# Patient Record
Sex: MALE | Race: WHITE | Employment: FULL TIME | URBAN - METROPOLITAN AREA
[De-identification: names, ages, dates, MRNs, and addresses within clinical notes are randomized per-mention and may not be internally consistent; named-entity substitution may affect disease eponyms.]

---

## 2017-01-10 LAB
ALBUMIN SERPL-MCNC: 4.2 G/DL
ALP BLD-CCNC: 61 U/L
ALT SERPL-CCNC: 21 U/L
AST SERPL-CCNC: 17 U/L
BASOPHILS ABSOLUTE: NORMAL /ΜL
BASOPHILS RELATIVE PERCENT: NORMAL %
BILIRUB SERPL-MCNC: 0.4 MG/DL (ref 0.1–1.4)
BUN BLDV-MCNC: 18 MG/DL
CALCIUM SERPL-MCNC: 9.4 MG/DL
CHLORIDE BLD-SCNC: NORMAL MMOL/L
CHOLESTEROL, TOTAL: 201 MG/DL
CHOLESTEROL/HDL RATIO: 4.7
CO2: NORMAL MMOL/L
CREAT SERPL-MCNC: 1.11 MG/DL
EOSINOPHILS ABSOLUTE: NORMAL /ΜL
EOSINOPHILS RELATIVE PERCENT: NORMAL %
GFR CALCULATED: 73
GLUCOSE BLD-MCNC: 95 MG/DL
HCT VFR BLD CALC: 41.2 % (ref 41–53)
HDLC SERPL-MCNC: 43 MG/DL (ref 35–70)
HEMOGLOBIN: 13.9 G/DL (ref 13.5–17.5)
IRON: 84
LDL CHOLESTEROL CALCULATED: 134 MG/DL (ref 0–160)
LYMPHOCYTES ABSOLUTE: NORMAL /ΜL
LYMPHOCYTES RELATIVE PERCENT: NORMAL %
MCH RBC QN AUTO: 28.2 PG
MCHC RBC AUTO-ENTMCNC: 33.7 G/DL
MCV RBC AUTO: 84 FL
MONOCYTES ABSOLUTE: NORMAL /ΜL
MONOCYTES RELATIVE PERCENT: NORMAL %
NEUTROPHILS ABSOLUTE: NORMAL /ΜL
NEUTROPHILS RELATIVE PERCENT: NORMAL %
PDW BLD-RTO: 13.9 %
PHOSPHORUS: 4.2 MG/DL
PLATELET # BLD: 263 K/ΜL
PMV BLD AUTO: NORMAL FL
POTASSIUM SERPL-SCNC: 5.5 MMOL/L
PROSTATE SPECIFIC ANTIGEN: 0.3 NG/ML
RBC # BLD: 4.93 10^6/ΜL
SODIUM BLD-SCNC: NORMAL MMOL/L
TOTAL PROTEIN: 6.3
TRIGL SERPL-MCNC: 118 MG/DL
TSH SERPL DL<=0.05 MIU/L-ACNC: 2.47 UIU/ML
URIC ACID: 4.6
VLDLC SERPL CALC-MCNC: 24 MG/DL
WBC # BLD: 5.9 10^3/ML

## 2017-01-17 ENCOUNTER — TELEPHONE (OUTPATIENT)
Dept: PULMONOLOGY | Age: 57
End: 2017-01-17

## 2017-01-17 DIAGNOSIS — R59.0 MEDIASTINAL ADENOPATHY: Primary | ICD-10-CM

## 2017-03-20 ENCOUNTER — HOSPITAL ENCOUNTER (OUTPATIENT)
Dept: CT IMAGING | Age: 57
Discharge: HOME | End: 2017-03-20
Attending: NURSE PRACTITIONER | Admitting: NURSE PRACTITIONER

## 2017-03-20 DIAGNOSIS — R59.0 MEDIASTINAL ADENOPATHY: ICD-10-CM

## 2017-03-20 DIAGNOSIS — R59.0 LOCALIZED ENLARGED LYMPH NODES: ICD-10-CM

## 2017-03-22 ENCOUNTER — OFFICE VISIT (OUTPATIENT)
Dept: PULMONOLOGY | Age: 57
End: 2017-03-22

## 2017-03-22 VITALS
DIASTOLIC BLOOD PRESSURE: 70 MMHG | BODY MASS INDEX: 29.13 KG/M2 | HEART RATE: 86 BPM | HEIGHT: 74 IN | RESPIRATION RATE: 16 BRPM | OXYGEN SATURATION: 96 % | SYSTOLIC BLOOD PRESSURE: 110 MMHG | WEIGHT: 227 LBS | TEMPERATURE: 98.2 F

## 2017-03-22 DIAGNOSIS — D86.9 SARCOID: ICD-10-CM

## 2017-03-22 DIAGNOSIS — R91.8 PULMONARY NODULES: ICD-10-CM

## 2017-03-22 DIAGNOSIS — R59.0 MEDIASTINAL ADENOPATHY: Primary | ICD-10-CM

## 2017-03-22 PROCEDURE — 99214 OFFICE O/P EST MOD 30 MIN: CPT | Performed by: INTERNAL MEDICINE

## 2017-09-29 ENCOUNTER — TELEPHONE (OUTPATIENT)
Dept: PULMONOLOGY | Age: 57
End: 2017-09-29

## 2017-10-02 ENCOUNTER — OFFICE VISIT (OUTPATIENT)
Dept: PULMONOLOGY | Age: 57
End: 2017-10-02

## 2017-10-02 VITALS
RESPIRATION RATE: 16 BRPM | HEIGHT: 74 IN | TEMPERATURE: 97.8 F | OXYGEN SATURATION: 98 % | DIASTOLIC BLOOD PRESSURE: 59 MMHG | BODY MASS INDEX: 29.52 KG/M2 | HEART RATE: 70 BPM | WEIGHT: 230 LBS | SYSTOLIC BLOOD PRESSURE: 101 MMHG

## 2017-10-02 DIAGNOSIS — D86.9 SARCOID: Primary | ICD-10-CM

## 2017-10-02 DIAGNOSIS — R91.8 PULMONARY NODULES: ICD-10-CM

## 2017-10-02 DIAGNOSIS — R59.0 MEDIASTINAL ADENOPATHY: ICD-10-CM

## 2017-10-02 PROCEDURE — 99214 OFFICE O/P EST MOD 30 MIN: CPT | Performed by: INTERNAL MEDICINE

## 2017-10-02 NOTE — PROGRESS NOTES
Chief complaint  This is a 61y.o. year old male  who comes to see me with a chief complaint of   Chief Complaint   Patient presents with    Follow-up     pt here to follow up on mediastinal adenopathy       HPI  Here with follow up on sarcoidosis    Doing well. No new issues. I had wanted him to get a CT in September to follow some new nodules found on the CT back in March. He said he was never told to get it. He has no complaints and is actually doing very good    Past Medical History:   Diagnosis Date    Psoriasis        Past Surgical History:   Procedure Laterality Date    ANTERIOR CRUCIATE LIGAMENT REPAIR Left 1990    APPENDECTOMY      BRONCHOSCOPY  12/12/2016    UPPER GASTROINTESTINAL ENDOSCOPY  12/29/2016    Dr Yuliana Bullock, dilation        No current outpatient prescriptions on file. No current outpatient prescriptions on file. No current facility-administered medications for this visit.         ROS:  GENERAL:  No fevers, chills, or night sweats  HEENT:  No double vision, blurry vision, or difficulty swallowing  HEART:  No chest pain, no palpitations  LUNGS:  No shortness of breath, no wheezing, no cough  ABDOMEN: No pain, no changes in bowels  GENITOURINARY:  No increased frequency, no blood in urine  EXTREMITIES:  No swelling, no lesions  NEURO:  No numbness or tingling, no seizures  SKIN:  No new rashes, no changes in hair or nails  LYMPH:  No masses, no swelling neck, armpits, or groin    PHYSICAL EXAM:  Vitals:    10/02/17 0859   BP: (!) 101/59   Pulse: 70   Resp: 16   Temp: 97.8 °F (36.6 °C)   SpO2: 98%       GENERAL:  Well nourished, alert, appears stated age, no distress  HEENT:  No scleral icterus, no conjunctival irritation  NECK:  No thyromegaly, no bruits  LYMPH:  No cervical or supraclavicular adenopathy  HEART:  Regular rate and rhythm, no murmurs  LUNGS:  Clear bilaterally  ABDOMEN:  No distention, no organomegaly  EXTREMITIES:  No edema, no digital clubbing  NEURO:  No localizing

## 2017-10-02 NOTE — MR AVS SNAPSHOT
your BMI by decreasing your calorie intake and becoming more physically active. Learn more at: enavu.co.uk          Instructions    CT Chest now    Follow up in 6 months            Medications and Orders      Allergies           No Known Allergies         Additional Information        Basic Information     Date Of Birth Sex Race Ethnicity Preferred Language    1960 Male White Non-/Non  English      Problem List as of 10/2/2017                 Abnormal CT scan, chest    Epigastric pain    Psoriasis      Immunizations as of 10/2/2017     Name Date    Hepatitis A 6/18/2002, 6/9/2000, 10/29/1999, 6/14/1996    Hepatitis B 6/18/2002, 6/9/2000, 10/29/1999    Influenza Vaccine, unspecified formulation 9/17/2016    Td 12/4/2013    Tetanus 6/18/2002      Preventive Care        Date Due    Hepatitis C screening is recommended for all adults regardless of risk factors born between [facility] at least once (lifetime) who have never been tested. 1960    HIV screening is recommended for all people regardless of risk factors  aged 15-65 years at least once (lifetime) who have never been HIV tested. 7/17/1974    Tetanus Combination Vaccine (1 - Tdap) 12/5/2013    Yearly Flu Vaccine (1) 9/1/2017    Colonoscopy 3/10/2019    Cholesterol Screening 1/10/2022            [employer] Signup           Our records indicate that you have an active [employer] account. You can view your After Visit Summary by going to [email or web address] and logging in with your [employer] username and password. If you don't have a [employer] username and password but a parent or guardian has access to your record, the parent or guardian should login with their own [employer] username and password and access your record to view the After Visit Summary.      Additional Information  If you have questions, please contact the physician practice where you receive care. Remember, [email or web address] is NOT to be used for urgent needs. For medical emergencies, dial 911. For questions regarding your [email or web address] account call [phone]. If you have a clinical question, please call your doctor's office.

## 2017-10-17 ENCOUNTER — HOSPITAL ENCOUNTER (OUTPATIENT)
Dept: CT IMAGING | Age: 57
Discharge: HOME | End: 2017-10-17
Attending: INTERNAL MEDICINE | Admitting: INTERNAL MEDICINE

## 2017-10-17 DIAGNOSIS — R91.8 PULMONARY NODULES: ICD-10-CM

## 2017-10-17 DIAGNOSIS — R91.8 OTHER NONSPECIFIC ABNORMAL FINDING OF LUNG FIELD (CODE): ICD-10-CM

## 2018-01-17 LAB
ALBUMIN SERPL-MCNC: 4.3 G/DL
ALP BLD-CCNC: 74 U/L
ALT SERPL-CCNC: 26 U/L
ANION GAP SERPL CALCULATED.3IONS-SCNC: NORMAL MMOL/L
AST SERPL-CCNC: 19 U/L
BASOPHILS ABSOLUTE: NORMAL /ΜL
BASOPHILS RELATIVE PERCENT: NORMAL %
BILIRUB SERPL-MCNC: 0.5 MG/DL (ref 0.1–1.4)
BUN BLDV-MCNC: 16 MG/DL
CALCIUM SERPL-MCNC: 9.7 MG/DL
CHLORIDE BLD-SCNC: 103 MMOL/L
CHOLESTEROL, TOTAL: 231 MG/DL
CHOLESTEROL/HDL RATIO: 4.4
CO2: NORMAL MMOL/L
CREAT SERPL-MCNC: 1.01 MG/DL
EOSINOPHILS ABSOLUTE: NORMAL /ΜL
EOSINOPHILS RELATIVE PERCENT: NORMAL %
GFR CALCULATED: 82
GLUCOSE BLD-MCNC: 92 MG/DL
GLUCOSE BLD-MCNC: 92 MG/DL
HCT VFR BLD CALC: 43.1 % (ref 41–53)
HDLC SERPL-MCNC: 52 MG/DL (ref 35–70)
HEMOGLOBIN: 14.9 G/DL (ref 13.5–17.5)
IRON: 124
LDL CHOLESTEROL CALCULATED: 161 MG/DL (ref 0–160)
LYMPHOCYTES ABSOLUTE: NORMAL /ΜL
LYMPHOCYTES RELATIVE PERCENT: NORMAL %
MCH RBC QN AUTO: 28.7 PG
MCHC RBC AUTO-ENTMCNC: 34.6 G/DL
MCV RBC AUTO: 83 FL
MONOCYTES ABSOLUTE: NORMAL /ΜL
MONOCYTES RELATIVE PERCENT: NORMAL %
NEUTROPHILS ABSOLUTE: NORMAL /ΜL
NEUTROPHILS RELATIVE PERCENT: NORMAL %
PLATELET # BLD: 223 K/ΜL
PMV BLD AUTO: NORMAL FL
POTASSIUM SERPL-SCNC: 5.1 MMOL/L
RBC # BLD: 5.19 10^6/ΜL
SODIUM BLD-SCNC: 142 MMOL/L
TOTAL PROTEIN: 6.6
TRIGL SERPL-MCNC: 88 MG/DL
URIC ACID: 5.3
VLDLC SERPL CALC-MCNC: 18 MG/DL
WBC # BLD: 5.1 10^3/ML

## 2018-01-30 ENCOUNTER — TELEPHONE (OUTPATIENT)
Dept: INTERNAL MEDICINE CLINIC | Age: 58
End: 2018-01-30

## 2018-06-26 ENCOUNTER — OFFICE VISIT (OUTPATIENT)
Dept: INTERNAL MEDICINE CLINIC | Age: 58
End: 2018-06-26

## 2018-06-26 VITALS
RESPIRATION RATE: 14 BRPM | BODY MASS INDEX: 30.67 KG/M2 | DIASTOLIC BLOOD PRESSURE: 73 MMHG | WEIGHT: 239 LBS | SYSTOLIC BLOOD PRESSURE: 129 MMHG | OXYGEN SATURATION: 97 % | HEIGHT: 74 IN | HEART RATE: 72 BPM

## 2018-06-26 DIAGNOSIS — M25.562 LEFT KNEE PAIN, UNSPECIFIED CHRONICITY: Primary | ICD-10-CM

## 2018-06-26 PROCEDURE — 99213 OFFICE O/P EST LOW 20 MIN: CPT | Performed by: INTERNAL MEDICINE

## 2018-06-26 RX ORDER — DICLOFENAC SODIUM 75 MG/1
75 TABLET, DELAYED RELEASE ORAL 2 TIMES DAILY
Qty: 60 TABLET | Refills: 1 | Status: SHIPPED | OUTPATIENT
Start: 2018-06-26 | End: 2019-10-18

## 2018-06-26 RX ORDER — KETOCONAZOLE 20 MG/G
CREAM TOPICAL
COMMUNITY
Start: 2018-06-22

## 2018-06-26 RX ORDER — CLOBETASOL PROPIONATE 0.05 MG/G
GEL TOPICAL
COMMUNITY
Start: 2018-06-22

## 2018-10-09 ENCOUNTER — OFFICE VISIT (OUTPATIENT)
Dept: INTERNAL MEDICINE CLINIC | Age: 58
End: 2018-10-09
Payer: COMMERCIAL

## 2018-10-09 VITALS
SYSTOLIC BLOOD PRESSURE: 118 MMHG | WEIGHT: 238 LBS | OXYGEN SATURATION: 98 % | TEMPERATURE: 98.6 F | BODY MASS INDEX: 30.54 KG/M2 | RESPIRATION RATE: 14 BRPM | HEART RATE: 78 BPM | DIASTOLIC BLOOD PRESSURE: 70 MMHG | HEIGHT: 74 IN

## 2018-10-09 DIAGNOSIS — Z23 NEED FOR PROPHYLACTIC VACCINATION AND INOCULATION AGAINST VARICELLA: ICD-10-CM

## 2018-10-09 DIAGNOSIS — Z23 NEED FOR SHINGLES VACCINE: ICD-10-CM

## 2018-10-09 DIAGNOSIS — N52.8 OTHER MALE ERECTILE DYSFUNCTION: ICD-10-CM

## 2018-10-09 DIAGNOSIS — Z00.00 ANNUAL PHYSICAL EXAM: Primary | ICD-10-CM

## 2018-10-09 PROCEDURE — 99214 OFFICE O/P EST MOD 30 MIN: CPT | Performed by: INTERNAL MEDICINE

## 2018-10-09 RX ORDER — SILDENAFIL CITRATE 20 MG/1
TABLET ORAL
Qty: 30 TABLET | Refills: 3 | Status: SHIPPED | OUTPATIENT
Start: 2018-10-09 | End: 2019-10-18

## 2018-10-09 ASSESSMENT — PATIENT HEALTH QUESTIONNAIRE - PHQ9
1. LITTLE INTEREST OR PLEASURE IN DOING THINGS: 0
2. FEELING DOWN, DEPRESSED OR HOPELESS: 0
SUM OF ALL RESPONSES TO PHQ QUESTIONS 1-9: 0
SUM OF ALL RESPONSES TO PHQ9 QUESTIONS 1 & 2: 0
SUM OF ALL RESPONSES TO PHQ QUESTIONS 1-9: 0

## 2018-10-09 ASSESSMENT — ENCOUNTER SYMPTOMS
RESPIRATORY NEGATIVE: 1
EYES NEGATIVE: 1
GASTROINTESTINAL NEGATIVE: 1

## 2018-10-09 NOTE — PROGRESS NOTES
regular rhythm and normal heart sounds. Pulmonary/Chest: Effort normal and breath sounds normal. No respiratory distress. He has no wheezes. Abdominal: Soft. Bowel sounds are normal. He exhibits no distension. Musculoskeletal: He exhibits no edema or tenderness. Neurological: He is alert and oriented to person, place, and time. Skin: Skin is warm and dry. Assessment:      1. Annual physical exam    2. Other male erectile dysfunction    3. Need for shingles vaccine            Plan:      Doris Rosas was seen today for annual exam and blood work. Diagnoses and all orders for this visit:    Annual physical exam  -     Glucose, Fasting; Future  -     Lipid Panel; Future    Other male erectile dysfunction  -     sildenafil (REVATIO) 20 MG tablet; Take 1-2 tablets one hour before sexual activity. Need for shingles vaccine  -     Zoster recombinant [facility])    Discussed need for colonoscopy next year. He states he feels well and does not want to return to Dr. Larry Burrell at this time.           Hawa Go MD

## 2018-10-10 ENCOUNTER — NURSE ONLY (OUTPATIENT)
Dept: INTERNAL MEDICINE CLINIC | Age: 58
End: 2018-10-10
Payer: COMMERCIAL

## 2018-10-10 DIAGNOSIS — Z00.00 ANNUAL PHYSICAL EXAM: ICD-10-CM

## 2018-10-10 LAB
CHOLESTEROL, TOTAL: 215 MG/DL (ref 0–199)
GLUCOSE FASTING: 96 MG/DL (ref 70–99)
HDLC SERPL-MCNC: 50 MG/DL (ref 40–60)
LDL CHOLESTEROL CALCULATED: 145 MG/DL
TRIGL SERPL-MCNC: 101 MG/DL (ref 0–150)
VLDLC SERPL CALC-MCNC: 20 MG/DL

## 2018-10-10 PROCEDURE — 36415 COLL VENOUS BLD VENIPUNCTURE: CPT | Performed by: INTERNAL MEDICINE

## 2018-10-29 ENCOUNTER — TELEPHONE (OUTPATIENT)
Dept: INTERNAL MEDICINE CLINIC | Age: 58
End: 2018-10-29

## 2019-10-18 ENCOUNTER — OFFICE VISIT (OUTPATIENT)
Dept: INTERNAL MEDICINE CLINIC | Age: 59
End: 2019-10-18
Payer: COMMERCIAL

## 2019-10-18 VITALS
DIASTOLIC BLOOD PRESSURE: 78 MMHG | WEIGHT: 241 LBS | SYSTOLIC BLOOD PRESSURE: 110 MMHG | RESPIRATION RATE: 16 BRPM | BODY MASS INDEX: 30.93 KG/M2 | OXYGEN SATURATION: 98 % | HEART RATE: 65 BPM | HEIGHT: 74 IN

## 2019-10-18 DIAGNOSIS — Z00.00 ENCOUNTER FOR PREVENTIVE CARE: ICD-10-CM

## 2019-10-18 LAB
A/G RATIO: 2.2 (ref 1.1–2.2)
ALBUMIN SERPL-MCNC: 4.6 G/DL (ref 3.4–5)
ALP BLD-CCNC: 62 U/L (ref 40–129)
ALT SERPL-CCNC: 18 U/L (ref 10–40)
ANION GAP SERPL CALCULATED.3IONS-SCNC: 12 MMOL/L (ref 3–16)
AST SERPL-CCNC: 16 U/L (ref 15–37)
BASOPHILS ABSOLUTE: 0 K/UL (ref 0–0.2)
BASOPHILS RELATIVE PERCENT: 1.1 %
BILIRUB SERPL-MCNC: 0.4 MG/DL (ref 0–1)
BUN BLDV-MCNC: 19 MG/DL (ref 7–20)
CALCIUM SERPL-MCNC: 9.5 MG/DL (ref 8.3–10.6)
CHLORIDE BLD-SCNC: 104 MMOL/L (ref 99–110)
CHOLESTEROL, TOTAL: 206 MG/DL (ref 0–199)
CO2: 25 MMOL/L (ref 21–32)
CREAT SERPL-MCNC: 0.9 MG/DL (ref 0.8–1.3)
EOSINOPHILS ABSOLUTE: 0.2 K/UL (ref 0–0.6)
EOSINOPHILS RELATIVE PERCENT: 5.1 %
GFR AFRICAN AMERICAN: >60
GFR NON-AFRICAN AMERICAN: >60
GLOBULIN: 2.1 G/DL
GLUCOSE BLD-MCNC: 95 MG/DL (ref 70–99)
HCT VFR BLD CALC: 44.9 % (ref 40.5–52.5)
HDLC SERPL-MCNC: 59 MG/DL (ref 40–60)
HEMOGLOBIN: 14.7 G/DL (ref 13.5–17.5)
LDL CHOLESTEROL CALCULATED: 132 MG/DL
LYMPHOCYTES ABSOLUTE: 1 K/UL (ref 1–5.1)
LYMPHOCYTES RELATIVE PERCENT: 24.3 %
MCH RBC QN AUTO: 28.7 PG (ref 26–34)
MCHC RBC AUTO-ENTMCNC: 32.8 G/DL (ref 31–36)
MCV RBC AUTO: 87.6 FL (ref 80–100)
MONOCYTES ABSOLUTE: 0.5 K/UL (ref 0–1.3)
MONOCYTES RELATIVE PERCENT: 12.5 %
NEUTROPHILS ABSOLUTE: 2.4 K/UL (ref 1.7–7.7)
NEUTROPHILS RELATIVE PERCENT: 57 %
PDW BLD-RTO: 14.4 % (ref 12.4–15.4)
PLATELET # BLD: 210 K/UL (ref 135–450)
PMV BLD AUTO: 9.4 FL (ref 5–10.5)
POTASSIUM SERPL-SCNC: 4.5 MMOL/L (ref 3.5–5.1)
PROSTATE SPECIFIC ANTIGEN: 0.58 NG/ML (ref 0–4)
RBC # BLD: 5.13 M/UL (ref 4.2–5.9)
SODIUM BLD-SCNC: 141 MMOL/L (ref 136–145)
TOTAL PROTEIN: 6.7 G/DL (ref 6.4–8.2)
TRIGL SERPL-MCNC: 74 MG/DL (ref 0–150)
TSH REFLEX: 2.93 UIU/ML (ref 0.27–4.2)
VLDLC SERPL CALC-MCNC: 15 MG/DL
WBC # BLD: 4.2 K/UL (ref 4–11)

## 2019-10-18 PROCEDURE — 99396 PREV VISIT EST AGE 40-64: CPT | Performed by: NURSE PRACTITIONER

## 2019-10-18 PROCEDURE — 36415 COLL VENOUS BLD VENIPUNCTURE: CPT | Performed by: NURSE PRACTITIONER

## 2019-10-18 ASSESSMENT — PATIENT HEALTH QUESTIONNAIRE - PHQ9
SUM OF ALL RESPONSES TO PHQ QUESTIONS 1-9: 0
1. LITTLE INTEREST OR PLEASURE IN DOING THINGS: 0
SUM OF ALL RESPONSES TO PHQ QUESTIONS 1-9: 0
SUM OF ALL RESPONSES TO PHQ9 QUESTIONS 1 & 2: 0
2. FEELING DOWN, DEPRESSED OR HOPELESS: 0

## 2019-10-22 ENCOUNTER — TELEPHONE (OUTPATIENT)
Dept: INTERNAL MEDICINE CLINIC | Age: 59
End: 2019-10-22

## 2020-01-18 ENCOUNTER — HOSPITAL ENCOUNTER (EMERGENCY)
Age: 60
Discharge: LEFT BEFORE DISCHARGE | End: 2020-01-18
Payer: COMMERCIAL

## 2020-06-29 ENCOUNTER — OFFICE VISIT (OUTPATIENT)
Dept: FAMILY MEDICINE CLINIC | Age: 60
End: 2020-06-29
Payer: COMMERCIAL

## 2020-06-29 VITALS
BODY MASS INDEX: 31.11 KG/M2 | SYSTOLIC BLOOD PRESSURE: 112 MMHG | HEIGHT: 74 IN | TEMPERATURE: 97.1 F | DIASTOLIC BLOOD PRESSURE: 78 MMHG | WEIGHT: 242.4 LBS

## 2020-06-29 LAB
A/G RATIO: 2.2 (ref 1.1–2.2)
ALBUMIN SERPL-MCNC: 4.4 G/DL (ref 3.4–5)
ALP BLD-CCNC: 64 U/L (ref 40–129)
ALT SERPL-CCNC: 20 U/L (ref 10–40)
ANION GAP SERPL CALCULATED.3IONS-SCNC: 14 MMOL/L (ref 3–16)
AST SERPL-CCNC: 17 U/L (ref 15–37)
BILIRUB SERPL-MCNC: 0.3 MG/DL (ref 0–1)
BUN BLDV-MCNC: 24 MG/DL (ref 7–20)
CALCIUM SERPL-MCNC: 9.7 MG/DL (ref 8.3–10.6)
CHLORIDE BLD-SCNC: 105 MMOL/L (ref 99–110)
CO2: 24 MMOL/L (ref 21–32)
CREAT SERPL-MCNC: 0.9 MG/DL (ref 0.8–1.3)
GFR AFRICAN AMERICAN: >60
GFR NON-AFRICAN AMERICAN: >60
GLOBULIN: 2 G/DL
GLUCOSE BLD-MCNC: 92 MG/DL (ref 70–99)
HCT VFR BLD CALC: 42.9 % (ref 40.5–52.5)
HEMOGLOBIN: 14.3 G/DL (ref 13.5–17.5)
MCH RBC QN AUTO: 29.1 PG (ref 26–34)
MCHC RBC AUTO-ENTMCNC: 33.3 G/DL (ref 31–36)
MCV RBC AUTO: 87.5 FL (ref 80–100)
PDW BLD-RTO: 14 % (ref 12.4–15.4)
PLATELET # BLD: 203 K/UL (ref 135–450)
PMV BLD AUTO: 9.5 FL (ref 5–10.5)
POTASSIUM SERPL-SCNC: 4.4 MMOL/L (ref 3.5–5.1)
RBC # BLD: 4.9 M/UL (ref 4.2–5.9)
SODIUM BLD-SCNC: 143 MMOL/L (ref 136–145)
TOTAL PROTEIN: 6.4 G/DL (ref 6.4–8.2)
WBC # BLD: 4.8 K/UL (ref 4–11)

## 2020-06-29 PROCEDURE — 93000 ELECTROCARDIOGRAM COMPLETE: CPT | Performed by: FAMILY MEDICINE

## 2020-06-29 PROCEDURE — 99214 OFFICE O/P EST MOD 30 MIN: CPT | Performed by: FAMILY MEDICINE

## 2020-06-29 ASSESSMENT — ENCOUNTER SYMPTOMS
VOICE CHANGE: 0
RHINORRHEA: 1
SINUS PRESSURE: 1
SORE THROAT: 0
TROUBLE SWALLOWING: 0

## 2020-06-29 NOTE — PROGRESS NOTES
2020    Ashish Burleson (:  1960) is a 60 y.o. male, here for evaluation of the following medical concerns:    HPI    1. Well adult visit  discussed vaccinations and he declined  -discussed HIV testing and basic labs he declined  -discuseds healthy eating habits and exercise and answered questions  -discussed age appropriate screening recommendations    2. Chest pain- has had it for 6 weeks, unsure what brings it on, no SOB, rest does improve symptoms, otc medication hasn't used, no history of htn, hyperlipidemia, , HDL 61, father passed away of a MI, at age [de-identified]. Believes its stress induced, possible two episodes, has one episode shalom. Years ago went to the ER and was found to be wnl,     3. Colon cancer screen- patient will be referred to Dr. Ajay Talbert for shalom. Patient needs colonoscopy q 5 years. 4.   Allergic rhinitis- patient has had rhinorrhea, congestion for the past several months. Hasn't used OTC medication on a consecutive basis. Patient denied fever or chills at this time. Today, he denied chest pain, sob, n, v, or diarrhea. Review of Systems   Constitutional: Negative for activity change, fatigue and unexpected weight change. HENT: Positive for congestion, rhinorrhea and sinus pressure. Negative for ear pain, sneezing, sore throat, trouble swallowing and voice change. Eyes: Negative for visual disturbance. Respiratory: Negative for cough and shortness of breath. Cardiovascular: Positive for chest pain. Negative for palpitations and leg swelling. Gastrointestinal: Negative for abdominal pain, diarrhea, nausea and vomiting. Skin: Negative for color change. Neurological: Negative for light-headedness and headaches. Psychiatric/Behavioral: Negative for dysphoric mood. The patient is not nervous/anxious. Prior to Visit Medications    Medication Sig Taking?  Authorizing Provider   clobetasol propionate 0.05 % GEL gel  Yes Historical Provider, MD

## 2020-06-30 ASSESSMENT — ENCOUNTER SYMPTOMS
VOMITING: 0
ABDOMINAL PAIN: 0
COLOR CHANGE: 0
DIARRHEA: 0
COUGH: 0
NAUSEA: 0
SHORTNESS OF BREATH: 0

## 2020-09-02 ENCOUNTER — TELEPHONE (OUTPATIENT)
Dept: FAMILY MEDICINE CLINIC | Age: 60
End: 2020-09-02

## 2020-09-02 NOTE — TELEPHONE ENCOUNTER
----- Message from [facility] sent at 9/2/2020  2:30 PM EDT -----  Subject: Message to Provider    QUESTIONS  Information for Provider? Patient states Dr. Fred Henao was supposed to have   scheduled a stress test for him    they spoke about this at the appointment on 06/29. Please call patient or   respond in My Chart to discuss. ---------------------------------------------------------------------------  --------------  Fabian MOORE  What is the best way for the office to contact you? Do not leave any   message   patient will call back for answer  Preferred Call Back Phone Number? [phone]  ---------------------------------------------------------------------------  --------------  SCRIPT ANSWERS  Relationship to Patient?  Self

## 2020-09-02 NOTE — TELEPHONE ENCOUNTER
Please contact the patient and let him know that the order is in? I'm not sure why he hasn't heard back? Let him know who to schedule.   If they need a new order let me know Dr. Rhett Dodd

## 2020-09-03 NOTE — TELEPHONE ENCOUNTER
Pt advised through [employer] message that the order was placed and given number to central scheduling to call and schedule

## 2020-09-08 ENCOUNTER — OFFICE VISIT (OUTPATIENT)
Dept: PRIMARY CARE CLINIC | Age: 60
End: 2020-09-08
Payer: COMMERCIAL

## 2020-09-08 PROCEDURE — 99211 OFF/OP EST MAY X REQ PHY/QHP: CPT | Performed by: NURSE PRACTITIONER

## 2020-09-10 LAB — SARS-COV-2, NAA: NOT DETECTED

## 2020-09-21 ENCOUNTER — OFFICE VISIT (OUTPATIENT)
Dept: PRIMARY CARE CLINIC | Age: 60
End: 2020-09-21
Payer: COMMERCIAL

## 2020-09-21 PROCEDURE — 99211 OFF/OP EST MAY X REQ PHY/QHP: CPT | Performed by: NURSE PRACTITIONER

## 2020-09-21 NOTE — PROGRESS NOTES
Michele Burleson received a viral test for COVID-19. They were educated on isolation and quarantine as appropriate. For any symptoms, they were directed to seek care from their PCP, given contact information to establish with a doctor, directed to an urgent care or the emergency room.

## 2020-09-22 LAB — SARS-COV-2, NAA: NOT DETECTED

## 2020-09-23 NOTE — FLOWSHEET NOTE
Preoperative Screening for Elective Surgery/Invasive Procedures While COVID-19 present in the community     Have you tested positive or have been told to self-isolate for COVID-19 like symptoms within the past 28 days?  Do you currently have any of the following symptoms? o Fever >100.0 F or 99.9 F in immunocompromised patients? o New onset cough, shortness of breath or difficulty breathing?  o New onset sore throat, myalgia (muscle aches and pains), headache, loss of taste/smell or diarrhea?  Have you had a potential exposure to COVID-19 within the past 14 days by:  o Close contact with a confirmed case? o Close contact with a healthcare worker,  or essential infrastructure worker (grocery store, [employer], gas station, public utilities or transportation)? o Do you reside in a congregate setting such as; skilled nursing facility, adult home, correctional facility, homeless shelter or other institutional setting?  o Have you had recent travel to a known COVID-19 hotspot? Indicate if the patient has a positive screen by answering yes to one or more of the above questions. Patients who test positive or screen positive prior to surgery or on the day of surgery should be evaluated in conjunction with the surgeon/proceduralist/anesthesiologist to determine the urgency of the procedure.     No to all above

## 2020-09-23 NOTE — PROGRESS NOTES
[facility] [facility] time_____9/29/20 1300_______        Surgery time____1400________        Do not eat or drink anything after 12:00 midnight prior to your surgery. This includes water chewing gum, mints and ice chips. You may brush your teeth and gargle the morning of your surgery, but do not swallow the water     Please see your family doctor/pediatrician for a history and physical and/or concerning medications. Bring any test results/reports from your physicians office. If you are under the care of a heart doctor or specialist doctor, please be aware that you may be asked to them for clearance    You may be asked to stop blood thinners such as Coumadin, Plavix, Fragmin, Lovenox, etc., or any anti-inflammatories such as:  Aspirin, Ibuprofen, Advil, Naproxen prior to your surgery. We also ask that you stop any OTC medications such as fish oil, vitamin E, glucosamine, garlic, Multivitamins, COQ 10, etc.    We ask that you do not smoke 24 hours prior to surgery  We ask that you do not  drink any alcoholic beverages 24 hours prior to surgery     You must make arrangements for a responsible adult to take you home after your surgery. For your safety you will not be allowed to leave alone or drive yourself home. Your surgery will be cancelled if you do not have a ride home. Also for your safety, it is strongly suggested that someone stay with you the first 24 hours after your surgery. A parent or legal guardian must accompany a child scheduled for surgery and plan to stay at the hospital until the child is discharged. Please do not bring other children with you. For your comfort, please wear simple loose fitting clothing to the hospital.  Please do not bring valuables. Do not wear any make-up or nail polish on your fingers or toes      For your safety, please do not wear any jewelry or body piercing's on the day of surgery.    All jewelry must be removed. If you have dentures, they will be removed before going to operating room. For your convenience, we will provide you with a container. If you wear contact lenses or glasses, they will be removed, please bring a case for them. If you have a living will and a durable power of  for healthcare, please bring in a copy. As part of our patient safety program to minimize surgical site infections, we ask you to do the following:    · Please notify your surgeon if you develop any illness between         now and the  day of your surgery. · This includes a cough, cold, fever, sore throat, nausea,         or vomiting, and diarrhea, etc.  ·  Please notify your surgeon if you experience dizziness, shortness         of breath or blurred vision between now and the time of your surgery. Do not shave your operative site 96 hours prior to surgery. For face and neck surgery, men may use an electric razor 48 hours   prior to surgery. You may shower the night before surgery or the morning of   your surgery with an antibacterial soap. You will need to bring a photo ID and insurance card    [facility] has an onsite pharmacy, would you like to utilize our pharmacy     If you will be staying overnight and use a C-pap machine, please bring   your C-pap to hospital     Our goal is to provide you with excellent care, therefore, visitors will be limited to two(2) in the room at a time so that we may focus on providing this care for you. Please contact pre-admission testing if you have any further questions. [facility] phone number:  [phone]    Please note these are generalized instructions for all surgical cases, you may be provided with more specific instructions according to your surgery.

## 2020-09-24 ENCOUNTER — OFFICE VISIT (OUTPATIENT)
Dept: PRIMARY CARE CLINIC | Age: 60
End: 2020-09-24
Payer: COMMERCIAL

## 2020-09-24 PROCEDURE — 99211 OFF/OP EST MAY X REQ PHY/QHP: CPT | Performed by: NURSE PRACTITIONER

## 2020-09-25 LAB — SARS-COV-2, NAA: NOT DETECTED

## 2020-09-25 NOTE — RESULT ENCOUNTER NOTE
Your test for COVID-19, also known as novel coronavirus, came back negative/not detected. No virus was detected from the sample collected. Testing is not 100%. Until your symptoms are fully resolved, you may still be contagious. We recommend that you remain isolated for 7 days minimum, or 24-48 hours after your symptoms have completely resolved, whichever is longer. If you were exposed to a known positive COVID-19 patient, then you must remain quarantined for 14 days. If you were tested for an upcoming procedure, then you should remain in quarantine until your procedure. Continually monitor symptoms. Contact a medical provider if symptoms are worsening. If you have any additional questions, contact your PCP.     For additional information, please visit the Centers for Disease Control and Prevention [email or web address].dk

## 2020-09-28 ENCOUNTER — ANESTHESIA EVENT (OUTPATIENT)
Dept: ENDOSCOPY | Age: 60
End: 2020-09-28
Payer: COMMERCIAL

## 2020-09-29 ENCOUNTER — ANESTHESIA (OUTPATIENT)
Dept: ENDOSCOPY | Age: 60
End: 2020-09-29
Payer: COMMERCIAL

## 2020-09-29 ENCOUNTER — HOSPITAL ENCOUNTER (OUTPATIENT)
Age: 60
Setting detail: OUTPATIENT SURGERY
Discharge: HOME | End: 2020-09-29
Attending: INTERNAL MEDICINE | Admitting: INTERNAL MEDICINE
Payer: COMMERCIAL

## 2020-09-29 VITALS
DIASTOLIC BLOOD PRESSURE: 83 MMHG | SYSTOLIC BLOOD PRESSURE: 126 MMHG | OXYGEN SATURATION: 98 % | HEART RATE: 64 BPM | BODY MASS INDEX: 30.36 KG/M2 | TEMPERATURE: 96.4 F | RESPIRATION RATE: 16 BRPM | HEIGHT: 74 IN | WEIGHT: 236.6 LBS

## 2020-09-29 VITALS
RESPIRATION RATE: 16 BRPM | SYSTOLIC BLOOD PRESSURE: 115 MMHG | DIASTOLIC BLOOD PRESSURE: 73 MMHG | TEMPERATURE: 96.4 F | OXYGEN SATURATION: 100 %

## 2020-09-29 PROCEDURE — 7100000011 HC PHASE II RECOVERY - ADDTL 15 MIN: Performed by: INTERNAL MEDICINE

## 2020-09-29 PROCEDURE — 2709999900 HC NON-CHARGEABLE SUPPLY: Performed by: INTERNAL MEDICINE

## 2020-09-29 PROCEDURE — 6360000002 HC RX W HCPCS

## 2020-09-29 PROCEDURE — 2500000003 HC RX 250 WO HCPCS

## 2020-09-29 PROCEDURE — 7100000010 HC PHASE II RECOVERY - FIRST 15 MIN: Performed by: INTERNAL MEDICINE

## 2020-09-29 PROCEDURE — 3700000000 HC ANESTHESIA ATTENDED CARE: Performed by: INTERNAL MEDICINE

## 2020-09-29 PROCEDURE — 2580000003 HC RX 258: Performed by: ANESTHESIOLOGY

## 2020-09-29 PROCEDURE — 88305 TISSUE EXAM BY PATHOLOGIST: CPT

## 2020-09-29 PROCEDURE — 3700000001 HC ADD 15 MINUTES (ANESTHESIA): Performed by: INTERNAL MEDICINE

## 2020-09-29 PROCEDURE — 3609010300 HC COLONOSCOPY W/BIOPSY SINGLE/MULTIPLE: Performed by: INTERNAL MEDICINE

## 2020-09-29 RX ORDER — SODIUM CHLORIDE 0.9 % (FLUSH) 0.9 %
10 SYRINGE (ML) INJECTION EVERY 12 HOURS SCHEDULED
Status: DISCONTINUED | OUTPATIENT
Start: 2020-09-29 | End: 2020-09-29 | Stop reason: HOSPADM

## 2020-09-29 RX ORDER — LIDOCAINE HYDROCHLORIDE 20 MG/ML
INJECTION, SOLUTION EPIDURAL; INFILTRATION; INTRACAUDAL; PERINEURAL PRN
Status: DISCONTINUED | OUTPATIENT
Start: 2020-09-29 | End: 2020-09-29 | Stop reason: SDUPTHER

## 2020-09-29 RX ORDER — SODIUM CHLORIDE 9 MG/ML
INJECTION, SOLUTION INTRAVENOUS CONTINUOUS
Status: DISCONTINUED | OUTPATIENT
Start: 2020-09-29 | End: 2020-09-29 | Stop reason: HOSPADM

## 2020-09-29 RX ORDER — MIDAZOLAM HYDROCHLORIDE 1 MG/ML
INJECTION INTRAMUSCULAR; INTRAVENOUS PRN
Status: DISCONTINUED | OUTPATIENT
Start: 2020-09-29 | End: 2020-09-29 | Stop reason: SDUPTHER

## 2020-09-29 RX ORDER — LABETALOL HYDROCHLORIDE 5 MG/ML
5 INJECTION, SOLUTION INTRAVENOUS EVERY 10 MIN PRN
Status: DISCONTINUED | OUTPATIENT
Start: 2020-09-29 | End: 2020-09-29 | Stop reason: HOSPADM

## 2020-09-29 RX ORDER — SODIUM CHLORIDE 0.9 % (FLUSH) 0.9 %
10 SYRINGE (ML) INJECTION PRN
Status: DISCONTINUED | OUTPATIENT
Start: 2020-09-29 | End: 2020-09-29 | Stop reason: HOSPADM

## 2020-09-29 RX ORDER — PROMETHAZINE HYDROCHLORIDE 25 MG/ML
6.25 INJECTION, SOLUTION INTRAMUSCULAR; INTRAVENOUS
Status: DISCONTINUED | OUTPATIENT
Start: 2020-09-29 | End: 2020-09-29 | Stop reason: HOSPADM

## 2020-09-29 RX ORDER — ONDANSETRON 2 MG/ML
4 INJECTION INTRAMUSCULAR; INTRAVENOUS
Status: DISCONTINUED | OUTPATIENT
Start: 2020-09-29 | End: 2020-09-29 | Stop reason: HOSPADM

## 2020-09-29 RX ORDER — PROPOFOL 10 MG/ML
INJECTION, EMULSION INTRAVENOUS PRN
Status: DISCONTINUED | OUTPATIENT
Start: 2020-09-29 | End: 2020-09-29 | Stop reason: SDUPTHER

## 2020-09-29 RX ADMIN — PROPOFOL 40 MG: 10 INJECTION, EMULSION INTRAVENOUS at 14:11

## 2020-09-29 RX ADMIN — PROPOFOL 40 MG: 10 INJECTION, EMULSION INTRAVENOUS at 14:17

## 2020-09-29 RX ADMIN — SODIUM CHLORIDE: 9 INJECTION, SOLUTION INTRAVENOUS at 13:40

## 2020-09-29 RX ADMIN — LIDOCAINE HYDROCHLORIDE 100 MG: 20 INJECTION, SOLUTION EPIDURAL; INFILTRATION; INTRACAUDAL; PERINEURAL at 14:06

## 2020-09-29 RX ADMIN — PROPOFOL 80 MG: 10 INJECTION, EMULSION INTRAVENOUS at 14:06

## 2020-09-29 RX ADMIN — MIDAZOLAM 2 MG: 1 INJECTION INTRAMUSCULAR; INTRAVENOUS at 14:06

## 2020-09-29 ASSESSMENT — PAIN - FUNCTIONAL ASSESSMENT: PAIN_FUNCTIONAL_ASSESSMENT: 0-10

## 2020-09-29 ASSESSMENT — PAIN SCALES - GENERAL
PAINLEVEL_OUTOF10: 0

## 2020-09-29 NOTE — ANESTHESIA PRE PROCEDURE
[facility] Department of Anesthesiology  Pre-Anesthesia Evaluation/Consultation       Name:  Issac Leonardo  : 1960  Age:  63 y.o. MRN:  [id number]  Date: 2020           Surgeon: Surgeon(s):  Ayse Aguirre MD    Procedure: Procedure(s):  COLORECTAL CANCER SCREENING, NOT HIGH RISK     No Known Allergies  Patient Active Problem List   Diagnosis    Psoriasis    Abnormal CT scan, chest     Past Medical History:   Diagnosis Date    Psoriasis      Past Surgical History:   Procedure Laterality Date    ANTERIOR CRUCIATE LIGAMENT REPAIR Left     APPENDECTOMY      BRONCHOSCOPY  2016    UPPER GASTROINTESTINAL ENDOSCOPY  2016    Dr Cass Valadez, michele      Social History     Tobacco Use    Smoking status: Never Smoker    Smokeless tobacco: Never Used    Tobacco comment: smoked 1/20 of a pack per day   Substance Use Topics    Alcohol use: Yes     Alcohol/week: 1.0 standard drinks     Types: 1 Cans of beer per week    Drug use: No     Medications  No current facility-administered medications on file prior to encounter.       Current Outpatient Medications on File Prior to Encounter   Medication Sig Dispense Refill    clobetasol propionate 0.05 % GEL gel       ketoconazole (NIZORAL) 2 % cream        Current Facility-Administered Medications   Medication Dose Route Frequency Provider Last Rate Last Dose    0.9 % sodium chloride infusion   Intravenous Continuous Kourtney Valentin  mL/hr at 20 1340      sodium chloride flush 0.9 % injection 10 mL  10 mL Intravenous 2 times per day Kourtney Valentin MD        sodium chloride flush 0.9 % injection 10 mL  10 mL Intravenous PRN Kourtney Valentin MD         Vital Signs (Current)   Vitals:    20 1333   BP: 117/76   Pulse: 71   Resp: 16   Temp: 96.4 °F (35.8 °C)   SpO2: 100%     Vital Signs Statistics (for past 48 hrs)     Temp  Av.4 °F (35.8 °C)  Min: 96.4 °F (35.8 °C)   Min taken time: 20 1333  Max: 96.4 °F (35.8 °C)   Max taken time: 20 1333  Pulse  Av  Min: 71   Min taken time: 20 1333  Max: 71   Max taken time: 20 1333  Resp  Av  Min: 16   Min taken time: 20 1333  Max: 16   Max taken time: 20 1333  BP  Min: 117/76   Min taken time: 20 1333  Max: 117/76   Max taken time: 20 1333  SpO2  Av %  Min: 100 %   Min taken time: 20 1333  Max: 100 %   Max taken time: 20 1333    BP Readings from Last 3 Encounters:   20 117/76   20 112/78   10/18/19 110/78     BMI  Body mass index is 30.38 kg/m². Estimated body mass index is 30.38 kg/m² as calculated from the following:    Height as of this encounter: 6' 2\" (1.88 m). Weight as of this encounter: 236 lb 9.6 oz (107.3 kg). CBC   Lab Results   Component Value Date    WBC 4.8 2020    RBC 4.90 2020    HGB 14.3 2020    HCT 42.9 2020    MCV 87.5 2020    RDW 14.0 2020     2020     CMP    Lab Results   Component Value Date     2020    K 4.4 2020     2020    CO2 24 2020    BUN 24 2020    CREATININE 0.9 2020    GFRAA >60 2020    GFRAA >60 2011    AGRATIO 2.2 2020    LABGLOM >60 2020    GLUCOSE 92 2020    PROT 6.4 2020    PROT 6.6 2011    CALCIUM 9.7 2020    BILITOT 0.3 2020    ALKPHOS 64 2020    AST 17 2020    ALT 20 2020     BMP    Lab Results   Component Value Date     2020    K 4.4 2020     2020    CO2 24 2020    BUN 24 2020    CREATININE 0.9 2020    CALCIUM 9.7 2020    GFRAA >60 2020    GFRAA >60 2011    LABGLOM >60 2020    GLUCOSE 92 2020     POCGlucose  No results for input(s): GLUCOSE in the last 72 hours.    [facility]    Lab Results   Component Value Date    PROTIME 11.0 2011    INR 1.01 2011    APTT 29.8 2011 HCG (If Applicable) No results found for: PREGTESTUR, PREGSERUM, HCG, HCGQUANT   ABGs No results found for: PHART, PO2ART, JLQ3WLY, BRG9BOG, BEART, Q5SHJRDY   Type & Screen (If Applicable)  No results found for: LABABO, LABRH                         BMI: Wt Readings from Last 3 Encounters:       NPO Status:   Date of last liquid consumption: 09/29/20   Time of last liquid consumption: 0930   Date of last solid food consumption: 09/28/20      Time of last solid consumption: 1400       Anesthesia Evaluation  Patient summary reviewed no history of anesthetic complications:   Airway: Mallampati: III  TM distance: >3 FB   Neck ROM: full   Dental: normal exam         Pulmonary:Negative Pulmonary ROS and normal exam                               Cardiovascular:  Exercise tolerance: good (>4 METS),   (+) dysrhythmias (1AVB):,         Rhythm: regular  Rate: normal           Beta Blocker:  Not on Beta Blocker         Neuro/Psych:   Negative Neuro/Psych ROS              GI/Hepatic/Renal:   (+) bowel prep,           Endo/Other: Negative Endo/Other ROS                    Abdominal:           Vascular: negative vascular ROS. Anesthesia Plan      MAC     ASA 2       Induction: intravenous. Anesthetic plan and risks discussed with patient. Plan discussed with CRNA. This pre-anesthesia assessment may be used as a history and physical.    DOS STAFF ADDENDUM:    Pt seen and examined, chart reviewed (including anesthesia, drug and allergy history). No interval changes to history and physical examination. Anesthetic plan, risks, benefits, alternatives, and personnel involved discussed with patient. Questions and concerns addressed. Patient(family) verbalized an understanding and agrees to proceed.       Sirisha Reece MD  September 29, 2020  1:45 PM

## 2020-09-29 NOTE — H&P
[facility]   Pre-operative History and Physical    Patient: Corrine Suarez  : 1960  Acct#: [de-identified]    History Obtained From: electronic medical record    HISTORY OF PRESENT ILLNESS  Procedure:Colonoscopy  Indications:screening  Past Medical History:        Diagnosis Date    Psoriasis      Past Surgical History:        Procedure Laterality Date    ANTERIOR CRUCIATE LIGAMENT REPAIR Left     APPENDECTOMY      BRONCHOSCOPY  2016    UPPER GASTROINTESTINAL ENDOSCOPY  2016    Dr Tanner Robb, dilation      Medications prior to admission:   Prior to Admission medications    Medication Sig Start Date End Date Taking? Authorizing Provider   clobetasol propionate 0.05 % GEL gel  18  Yes Historical Provider, MD   ketoconazole (NIZORAL) 2 % cream  18  Yes Historical Provider, MD     Allergies:   Patient has no known allergies. Social History     Socioeconomic History    Marital status:      Spouse name: Not on file    Number of children: Not on file    Years of education: Not on file    Highest education level: Not on file   Occupational History    Not on file   Social Needs    Financial resource strain: Not on file    Food insecurity     Worry: Not on file     Inability: Not on file    Transportation needs     Medical: Not on file     Non-medical: Not on file   Tobacco Use    Smoking status: Never Smoker    Smokeless tobacco: Never Used    Tobacco comment: smoked 1/20 of a pack per day   Substance and Sexual Activity    Alcohol use:  Yes     Alcohol/week: 1.0 standard drinks     Types: 1 Cans of beer per week    Drug use: No    Sexual activity: Yes   Lifestyle    Physical activity     Days per week: Not on file     Minutes per session: Not on file    Stress: Not on file   Relationships    Social connections     Talks on phone: Not on file     Gets together: Not on file     Attends Christianity service: Not on file     Active member of club or organization: Not on file     Attends meetings of clubs or organizations: Not on file     Relationship status: Not on file    Intimate partner violence     Fear of current or ex partner: Not on file     Emotionally abused: Not on file     Physically abused: Not on file     Forced sexual activity: Not on file   Other Topics Concern    Not on file   Social History Narrative    Not on file     Family History   Problem Relation Age of Onset    Other Father         CKD    Heart Disease Father     Hypertension Mother          PHYSICAL EXAM:      /76   Pulse 71   Temp 96.4 °F (35.8 °C) (Temporal)   Resp 16   Ht 6' 2\" (1.88 m)   Wt 236 lb 9.6 oz (107.3 kg)   SpO2 100%   BMI 30.38 kg/m²  I        Heart:normal    Lungs: normal    Abdomen: normal      ASA Grade:  See anesthesia note      ASSESSMENT AND PLAN:    1. Procedure options, risks and benefits reviewed with patient and expresses understanding.

## 2020-09-29 NOTE — ANESTHESIA POSTPROCEDURE EVALUATION
[facility] Department of Anesthesiology  Post-Anesthesia Note       Name:  Silvina Gomez                                  Age:  63 y.o. MRN:  [id number]     Last Vitals & Oxygen Saturation: /79   Pulse 60   Temp 96.4 °F (35.8 °C) (Temporal)   Resp 16   Ht 6' 2\" (1.88 m)   Wt 236 lb 9.6 oz (107.3 kg)   SpO2 100%   BMI 30.38 kg/m²   Patient Vitals for the past 4 hrs:   BP Temp Temp src Pulse Resp SpO2 Height Weight   09/29/20 1427 114/79 96.4 °F (35.8 °C) Temporal 60 16 100 % -- --   09/29/20 1333 117/76 96.4 °F (35.8 °C) Temporal 71 16 100 % 6' 2\" (1.88 m) 236 lb 9.6 oz (107.3 kg)       Level of consciousness:  Awake, alert    Respiratory: Respirations easy, no distress. Stable. Cardiovascular: Hemodynamically stable. Hydration: Adequate. PONV: Adequately managed. Post-op pain: Adequately controlled. Post-op assessment: Tolerated anesthetic well without complication. Complications:  None.     Minerva Collins MD  September 29, 2020   2:35 PM

## 2020-09-29 NOTE — PROCEDURES
[facility]  Endoscopy Note    Patient: Les Jackson  : 1960  Acct#: [de-identified]    Procedure: Colonoscopy with biopsy    Date:  2020    Surgeon:  Jacob Triplett MD    Referring Physician:  Cristy Jaeger    Previous Colonoscopy: Yes  Date: unknown  Greater than 3 years? Yes    Preoperative Diagnosis:  screening    Postoperative Diagnosis:  Colon polyps    Anesthesia:  See anesthesia note    Indications: This is a 63y.o. year old male who presents today with screening for colon cancer. Procedure: An informed consent was obtained from the patient after explanation of indications, benefits, possible risks and complications of the procedure. The patient was then taken to the endoscopy suite, placed in the left lateral decubitus position, and the above IV anesthesia was administered. A digital rectal examination was performed and revealed negative without mass, lesions or tenderness. The Olympus CFQ-180-AL video colonoscope was placed in the patient's rectum under digital direction and advanced to the cecum. The cecum was identified by characteristic anatomy and ballottment. The ileocecal valve was identified. The preparation was excellent. The scope was then withdrawn back through the cecum, ascending, transverse, descending and sigmoid colons. Carefull circumferential examination of the mucosa in these areas demonstrated two 2 mm polyps in the cecum that was biopsied and removed. The scope was then withdrawn into the rectum and retroflexed. The retroflexed view of the anal verge and rectum demonstrates no abnormalities. The scope was straightened, the colon was decompressed and the scope was withdrawn from the patient. The patient tolerated the procedure well and was taken to the PACU in good condition. Estimated Blood Loss:  none    Impression:  Colon polyps    Recommendations:  Await pathology. Repeat colonoscopy in 5 years.     Jacob Triplett MD   [place]

## 2020-10-06 ENCOUNTER — OFFICE VISIT (OUTPATIENT)
Dept: PRIMARY CARE CLINIC | Age: 60
End: 2020-10-06
Payer: COMMERCIAL

## 2020-10-06 PROCEDURE — 99211 OFF/OP EST MAY X REQ PHY/QHP: CPT | Performed by: NURSE PRACTITIONER

## 2020-10-07 LAB — SARS-COV-2, NAA: NOT DETECTED

## 2020-11-02 ENCOUNTER — OFFICE VISIT (OUTPATIENT)
Dept: PRIMARY CARE CLINIC | Age: 60
End: 2020-11-02
Payer: COMMERCIAL

## 2020-11-02 PROCEDURE — 99211 OFF/OP EST MAY X REQ PHY/QHP: CPT | Performed by: NURSE PRACTITIONER

## 2020-11-03 LAB — SARS-COV-2, NAA: NOT DETECTED

## 2020-11-18 ENCOUNTER — OFFICE VISIT (OUTPATIENT)
Dept: PRIMARY CARE CLINIC | Age: 60
End: 2020-11-18
Payer: COMMERCIAL

## 2020-11-18 PROCEDURE — 99211 OFF/OP EST MAY X REQ PHY/QHP: CPT | Performed by: NURSE PRACTITIONER

## 2020-11-19 LAB — SARS-COV-2, NAA: NOT DETECTED

## 2020-11-25 ENCOUNTER — OFFICE VISIT (OUTPATIENT)
Dept: PRIMARY CARE CLINIC | Age: 60
End: 2020-11-25
Payer: COMMERCIAL

## 2020-11-25 PROCEDURE — 99211 OFF/OP EST MAY X REQ PHY/QHP: CPT | Performed by: NURSE PRACTITIONER

## 2020-11-27 LAB — SARS-COV-2, NAA: NOT DETECTED

## 2020-12-11 ENCOUNTER — OFFICE VISIT (OUTPATIENT)
Dept: FAMILY MEDICINE CLINIC | Age: 60
End: 2020-12-11
Payer: COMMERCIAL

## 2020-12-11 VITALS
TEMPERATURE: 97.5 F | WEIGHT: 238 LBS | HEIGHT: 73 IN | HEART RATE: 69 BPM | SYSTOLIC BLOOD PRESSURE: 110 MMHG | BODY MASS INDEX: 31.54 KG/M2 | OXYGEN SATURATION: 98 % | RESPIRATION RATE: 16 BRPM | DIASTOLIC BLOOD PRESSURE: 74 MMHG

## 2020-12-11 LAB
CHOLESTEROL, TOTAL: 208 MG/DL (ref 0–199)
GLUCOSE BLD-MCNC: 86 MG/DL (ref 70–99)
HDLC SERPL-MCNC: 49 MG/DL (ref 40–60)
LDL CHOLESTEROL CALCULATED: 144 MG/DL
TRIGL SERPL-MCNC: 77 MG/DL (ref 0–150)
VLDLC SERPL CALC-MCNC: 15 MG/DL

## 2020-12-11 PROCEDURE — 99214 OFFICE O/P EST MOD 30 MIN: CPT | Performed by: FAMILY MEDICINE

## 2020-12-11 ASSESSMENT — ENCOUNTER SYMPTOMS
ABDOMINAL PAIN: 0
WHEEZING: 0
RHINORRHEA: 0
VOMITING: 0
COUGH: 0
CHEST TIGHTNESS: 0
TROUBLE SWALLOWING: 0
SORE THROAT: 0
DIARRHEA: 0
SINUS PRESSURE: 0
EYE DISCHARGE: 0
NAUSEA: 0
SHORTNESS OF BREATH: 0

## 2020-12-11 NOTE — PROGRESS NOTES
2020     Last Burleson (:  1960) is a 63 y.o. male, here for evaluation of the following medical concerns:    HPI     Mixed hyperlipidmia-  Patient has had elevated cholesterol in the past, is needing it checked for work, had LD of 132 in the past, HDL of 59, needs lipid panel obtained today. Obesity- patient is consider class 1, will check A1C today, patient understands the need to lose weight but frankly feels well at his weight, doesn't agree with the classification of obesity at this time. Chest pain- no recent episodes, never scheduled his stress test but plans on doing this, Chest pain- has had this back in the summer for 6 weeks, unsure what brings it on, no SOB, rest does improve symptoms, otc medication hasn't used, no history of htn, hyperlipidemia, , HDL 61, father passed away of a MI, at age [de-identified]. Believes its stress induced, possible two episodes, has one episode shalom. Years ago went to the ER and was found to be wnl,     Today, denied chest pain, sob, n, v, or diarrhea. Review of Systems   Constitutional: Negative for activity change, fatigue, fever and unexpected weight change. HENT: Negative for congestion, ear pain, rhinorrhea, sinus pressure, sore throat and trouble swallowing. Eyes: Negative for discharge and visual disturbance. Respiratory: Negative for cough, chest tightness, shortness of breath and wheezing. Cardiovascular: Negative for chest pain, palpitations and leg swelling. Gastrointestinal: Negative for abdominal pain, diarrhea, nausea and vomiting. Musculoskeletal: Negative for arthralgias. Skin: Negative for rash. Neurological: Negative for dizziness, syncope, light-headedness and headaches. Psychiatric/Behavioral: Negative for dysphoric mood. The patient is not nervous/anxious. Prior to Visit Medications    Medication Sig Taking?  Authorizing Provider   clobetasol propionate 0.05 % GEL gel  Yes Historical Provider, MD ketoconazole (NIZORAL) 2 % cream  Yes Historical Provider, MD        Social History     Tobacco Use    Smoking status: Never Smoker    Smokeless tobacco: Never Used    Tobacco comment: smoked 1/20 of a pack per day   Substance Use Topics    Alcohol use: Yes     Alcohol/week: 1.0 standard drinks     Types: 1 Cans of beer per week        Vitals:    12/11/20 0821   BP: 110/74   Pulse: 69   Resp: 16   Temp: 97.5 °F (36.4 °C)   TempSrc: Temporal   SpO2: 98%   Weight: 238 lb (108 kg)   Height: 6' 1.11\" (1.857 m)     Estimated body mass index is 31.31 kg/m² as calculated from the following:    Height as of this encounter: 6' 1.11\" (1.857 m). Weight as of this encounter: 238 lb (108 kg). Physical Exam  Vitals signs and nursing note reviewed. Constitutional:       Appearance: He is well-developed. HENT:      Head: Normocephalic and atraumatic. Right Ear: Tympanic membrane, ear canal and external ear normal.      Left Ear: Tympanic membrane, ear canal and external ear normal.      Nose: Nose normal.      Mouth/Throat:      Mouth: Mucous membranes are moist.   Eyes:      Pupils: Pupils are equal, round, and reactive to light. Neck:      Thyroid: No thyromegaly. Cardiovascular:      Rate and Rhythm: Normal rate and regular rhythm. Heart sounds: No murmur. Pulmonary:      Effort: Pulmonary effort is normal.      Breath sounds: Normal breath sounds. No wheezing or rales. Abdominal:      General: Bowel sounds are normal.      Palpations: Abdomen is soft. Tenderness: There is no abdominal tenderness. Musculoskeletal: Normal range of motion. Skin:     Findings: No rash. Neurological:      General: No focal deficit present. Mental Status: He is alert and oriented to person, place, and time. Mental status is at baseline. Psychiatric:         Behavior: Behavior normal.         Judgment: Judgment normal.         ASSESSMENT/PLAN:  1.  Mixed hyperlipidemia Continue with conservative treatment  Discussed the need to exercise 30 minutes each day. Monitor diet, avoid fried foods etc... Educated on need to reduce cholesterol in diet and results of poor diet.    - GLUCOSE  - LIPID PANEL  - HEMOGLOBIN A1C    2. Class 1 obesity due to excess calories with serious comorbidity and body mass index (BMI) of 30.0 to 30.9 in adult  Discussed the need to exercise 30 minutes each day. Monitor diet and push water. Reduce refined carbs and replace with fiber and vegetables. Decrease portion size and limit snacking, stop eating after dinner  Count calories. - GLUCOSE  - LIPID PANEL  - HEMOGLOBIN A1C    3. Chest pain, unspecified type  Needs to have stress test  Discussed signs and symptoms for immediate evaluation in the ER. Questions answered  Reassured the patien.   - GLUCOSE  - LIPID PANEL  - HEMOGLOBIN A1C      Return in about 6 months (around 6/11/2021).

## 2020-12-12 LAB
ESTIMATED AVERAGE GLUCOSE: 114 MG/DL
HBA1C MFR BLD: 5.6 %

## 2020-12-15 ENCOUNTER — TELEPHONE (OUTPATIENT)
Dept: FAMILY MEDICINE CLINIC | Age: 60
End: 2020-12-15

## 2020-12-15 NOTE — TELEPHONE ENCOUNTER
Patient was just in for his physical last week and his biometric form was brought in it is due to be turned in today. I don't a see it in the chart, completed. Patient is calling to make sure that this form gets faxed over in time. Please call patient to confirm it was completed and sent.  Pt [phone]

## 2020-12-21 ENCOUNTER — TELEPHONE (OUTPATIENT)
Dept: FAMILY MEDICINE CLINIC | Age: 60
End: 2020-12-21

## 2020-12-21 NOTE — TELEPHONE ENCOUNTER
Sravanthi Delvalle with [employer]. Pt is scheduled for stress test tomorrow morning at 8am. And there are no orders in for this.  Please enter orders       Sravanthi Delvalle [phone]

## 2020-12-22 ENCOUNTER — HOSPITAL ENCOUNTER (OUTPATIENT)
Dept: NON INVASIVE DIAGNOSTICS | Age: 60
Discharge: HOME | End: 2020-12-22
Payer: COMMERCIAL

## 2021-01-07 RX ORDER — AMOXICILLIN 500 MG/1
500 CAPSULE ORAL 3 TIMES DAILY
Qty: 21 CAPSULE | Refills: 0 | Status: SHIPPED | OUTPATIENT
Start: 2021-01-07 | End: 2021-01-14

## 2021-01-11 DIAGNOSIS — R07.9 CHEST PAIN, UNSPECIFIED TYPE: Primary | ICD-10-CM

## 2021-01-11 DIAGNOSIS — E78.2 MIXED HYPERLIPIDEMIA: ICD-10-CM

## 2021-01-12 ENCOUNTER — OFFICE VISIT (OUTPATIENT)
Dept: PRIMARY CARE CLINIC | Age: 61
End: 2021-01-12
Payer: COMMERCIAL

## 2021-01-12 DIAGNOSIS — Z20.822 SUSPECTED COVID-19 VIRUS INFECTION: Primary | ICD-10-CM

## 2021-01-12 PROCEDURE — 99211 OFF/OP EST MAY X REQ PHY/QHP: CPT | Performed by: NURSE PRACTITIONER

## 2021-01-13 LAB — SARS-COV-2, NAA: DETECTED

## 2021-04-15 ENCOUNTER — OFFICE VISIT (OUTPATIENT)
Dept: PRIMARY CARE CLINIC | Age: 61
End: 2021-04-15
Payer: COMMERCIAL

## 2021-04-15 DIAGNOSIS — Z01.818 PREOP EXAMINATION: Primary | ICD-10-CM

## 2021-04-15 LAB — SARS-COV-2: NOT DETECTED

## 2021-04-15 PROCEDURE — 99211 OFF/OP EST MAY X REQ PHY/QHP: CPT | Performed by: NURSE PRACTITIONER

## 2021-07-21 ENCOUNTER — HOSPITAL ENCOUNTER (OUTPATIENT)
Dept: NON INVASIVE DIAGNOSTICS | Age: 61
Discharge: HOME | End: 2021-07-21
Payer: COMMERCIAL

## 2021-07-21 DIAGNOSIS — R07.9 CHEST PAIN, UNSPECIFIED TYPE: ICD-10-CM

## 2021-07-21 DIAGNOSIS — E78.2 MIXED HYPERLIPIDEMIA: ICD-10-CM

## 2021-07-21 LAB
LV EF: 62 %
LVEF MODALITY: NORMAL

## 2021-07-21 PROCEDURE — 93017 CV STRESS TEST TRACING ONLY: CPT

## 2021-07-21 PROCEDURE — A9502 TC99M TETROFOSMIN: HCPCS | Performed by: FAMILY MEDICINE

## 2021-07-21 PROCEDURE — 3430000000 HC RX DIAGNOSTIC RADIOPHARMACEUTICAL: Performed by: FAMILY MEDICINE

## 2021-07-21 PROCEDURE — 78452 HT MUSCLE IMAGE SPECT MULT: CPT

## 2021-07-21 RX ADMIN — TETROFOSMIN 30 MILLICURIE: 1.38 INJECTION, POWDER, LYOPHILIZED, FOR SOLUTION INTRAVENOUS at 13:13

## 2021-07-21 RX ADMIN — TETROFOSMIN 10 MILLICURIE: 1.38 INJECTION, POWDER, LYOPHILIZED, FOR SOLUTION INTRAVENOUS at 12:05

## 2021-08-06 ENCOUNTER — E-VISIT (OUTPATIENT)
Dept: FAMILY MEDICINE CLINIC | Age: 61
End: 2021-08-06
Payer: COMMERCIAL

## 2021-08-06 DIAGNOSIS — B96.89 ACUTE BACTERIAL SINUSITIS: ICD-10-CM

## 2021-08-06 DIAGNOSIS — J01.90 ACUTE BACTERIAL SINUSITIS: ICD-10-CM

## 2021-08-06 DIAGNOSIS — J30.9 ALLERGIC RHINITIS, UNSPECIFIED SEASONALITY, UNSPECIFIED TRIGGER: Primary | ICD-10-CM

## 2021-08-06 PROCEDURE — 99422 OL DIG E/M SVC 11-20 MIN: CPT | Performed by: FAMILY MEDICINE

## 2021-08-06 RX ORDER — LORATADINE 10 MG/1
10 TABLET ORAL DAILY
Qty: 30 TABLET | Refills: 2 | Status: SHIPPED | OUTPATIENT
Start: 2021-08-06 | End: 2021-09-05

## 2021-08-06 RX ORDER — AMOXICILLIN 875 MG/1
875 TABLET, COATED ORAL 2 TIMES DAILY
Qty: 20 TABLET | Refills: 0 | Status: SHIPPED | OUTPATIENT
Start: 2021-08-06 | End: 2021-08-16

## 2021-08-06 ASSESSMENT — LIFESTYLE VARIABLES: SMOKING_STATUS: NO, I'VE NEVER SMOKED

## 2021-08-06 NOTE — PROGRESS NOTES
Tommy Burleson (:  1960) is a 57 y.o. male,Established patient, here for evaluation of the following chief complaint(s):  No chief complaint on file. Sinusitis  Allergic rhinitis     ASSESSMENT/PLAN:  1. Allergic rhinitis, unspecified seasonality, unspecified trigger  2. Acute bacterial sinusitis      No follow-ups on file. Allergic rhinitis  Medication provided. Discussed medication use and side effects. Discussed allergy avoidance and possible etiologist including pets and carpet. Discussed the use of proper handwashing and showers. If not improved can discuss allergy referral at next appointment. Sinusitis  Antibiotic provided for 10 days  Push fluids and rest  Use Tylenol/Ibuprofen for pain  Please rtc if not improved. Subjective   SUBJECTIVE/OBJECTIVE:  HPI  Coughing  Mucus production  Nasal congestion. 2+ years  Review of Systems       Objective   Physical Exam   N/A    On this date 2021 I have spent 11 minutes reviewing previous notes, test results and face to face with the patient discussing the diagnosis and importance of compliance with the treatment plan as well as documenting on the day of the visit. An electronic signature was used to authenticate this note.     --Yessica Winkler DO

## 2021-12-01 ENCOUNTER — OFFICE VISIT (OUTPATIENT)
Dept: FAMILY MEDICINE CLINIC | Age: 61
End: 2021-12-01
Payer: COMMERCIAL

## 2021-12-01 VITALS
OXYGEN SATURATION: 96 % | SYSTOLIC BLOOD PRESSURE: 122 MMHG | WEIGHT: 242.2 LBS | DIASTOLIC BLOOD PRESSURE: 62 MMHG | HEART RATE: 70 BPM | BODY MASS INDEX: 31.86 KG/M2

## 2021-12-01 DIAGNOSIS — N52.9 ERECTILE DYSFUNCTION, UNSPECIFIED ERECTILE DYSFUNCTION TYPE: ICD-10-CM

## 2021-12-01 DIAGNOSIS — Z00.00 EXAMINATION, MEDICAL, GENERAL: Primary | ICD-10-CM

## 2021-12-01 DIAGNOSIS — Z12.5 SCREENING PSA (PROSTATE SPECIFIC ANTIGEN): ICD-10-CM

## 2021-12-01 LAB
A/G RATIO: 2.2 (ref 1.1–2.2)
ALBUMIN SERPL-MCNC: 4.7 G/DL (ref 3.4–5)
ALP BLD-CCNC: 65 U/L (ref 40–129)
ALT SERPL-CCNC: 25 U/L (ref 10–40)
ANION GAP SERPL CALCULATED.3IONS-SCNC: 11 MMOL/L (ref 3–16)
AST SERPL-CCNC: 19 U/L (ref 15–37)
BILIRUB SERPL-MCNC: 0.4 MG/DL (ref 0–1)
BUN BLDV-MCNC: 18 MG/DL (ref 7–20)
CALCIUM SERPL-MCNC: 9.4 MG/DL (ref 8.3–10.6)
CHLORIDE BLD-SCNC: 101 MMOL/L (ref 99–110)
CHOLESTEROL, TOTAL: 222 MG/DL (ref 0–199)
CO2: 27 MMOL/L (ref 21–32)
CREAT SERPL-MCNC: 0.8 MG/DL (ref 0.8–1.3)
GFR AFRICAN AMERICAN: >60
GFR NON-AFRICAN AMERICAN: >60
GLUCOSE BLD-MCNC: 83 MG/DL (ref 70–99)
HCT VFR BLD CALC: 44.2 % (ref 40.5–52.5)
HDLC SERPL-MCNC: 51 MG/DL (ref 40–60)
HEMOGLOBIN: 14.6 G/DL (ref 13.5–17.5)
LDL CHOLESTEROL CALCULATED: 149 MG/DL
MCH RBC QN AUTO: 28.5 PG (ref 26–34)
MCHC RBC AUTO-ENTMCNC: 33 G/DL (ref 31–36)
MCV RBC AUTO: 86.4 FL (ref 80–100)
PDW BLD-RTO: 14 % (ref 12.4–15.4)
PLATELET # BLD: 224 K/UL (ref 135–450)
PMV BLD AUTO: 9.1 FL (ref 5–10.5)
POTASSIUM SERPL-SCNC: 4.5 MMOL/L (ref 3.5–5.1)
RBC # BLD: 5.12 M/UL (ref 4.2–5.9)
SODIUM BLD-SCNC: 139 MMOL/L (ref 136–145)
TOTAL PROTEIN: 6.8 G/DL (ref 6.4–8.2)
TRIGL SERPL-MCNC: 108 MG/DL (ref 0–150)
VLDLC SERPL CALC-MCNC: 22 MG/DL
WBC # BLD: 4.8 K/UL (ref 4–11)

## 2021-12-01 PROCEDURE — 36415 COLL VENOUS BLD VENIPUNCTURE: CPT | Performed by: FAMILY MEDICINE

## 2021-12-01 PROCEDURE — 99396 PREV VISIT EST AGE 40-64: CPT | Performed by: FAMILY MEDICINE

## 2021-12-01 RX ORDER — ZOSTER VACCINE RECOMBINANT, ADJUVANTED 50 MCG/0.5
0.5 KIT INTRAMUSCULAR SEE ADMIN INSTRUCTIONS
Qty: 0.5 ML | Refills: 0 | Status: SHIPPED | OUTPATIENT
Start: 2021-12-01 | End: 2022-05-30

## 2021-12-01 RX ORDER — SILDENAFIL 50 MG/1
50 TABLET, FILM COATED ORAL PRN
Qty: 90 TABLET | Refills: 0 | Status: SHIPPED | OUTPATIENT
Start: 2021-12-01

## 2021-12-01 SDOH — ECONOMIC STABILITY: FOOD INSECURITY: WITHIN THE PAST 12 MONTHS, THE FOOD YOU BOUGHT JUST DIDN'T LAST AND YOU DIDN'T HAVE MONEY TO GET MORE.: NEVER TRUE

## 2021-12-01 SDOH — ECONOMIC STABILITY: FOOD INSECURITY: WITHIN THE PAST 12 MONTHS, YOU WORRIED THAT YOUR FOOD WOULD RUN OUT BEFORE YOU GOT MONEY TO BUY MORE.: NEVER TRUE

## 2021-12-01 ASSESSMENT — ENCOUNTER SYMPTOMS
RHINORRHEA: 0
NAUSEA: 0
BACK PAIN: 0
ABDOMINAL PAIN: 0
VOMITING: 0
SINUS PRESSURE: 0
SHORTNESS OF BREATH: 0
COUGH: 0
ANAL BLEEDING: 0
FACIAL SWELLING: 0
SORE THROAT: 0
DIARRHEA: 0

## 2021-12-01 ASSESSMENT — PATIENT HEALTH QUESTIONNAIRE - PHQ9
SUM OF ALL RESPONSES TO PHQ QUESTIONS 1-9: 0
SUM OF ALL RESPONSES TO PHQ QUESTIONS 1-9: 0
1. LITTLE INTEREST OR PLEASURE IN DOING THINGS: 0
SUM OF ALL RESPONSES TO PHQ9 QUESTIONS 1 & 2: 0
2. FEELING DOWN, DEPRESSED OR HOPELESS: 0
SUM OF ALL RESPONSES TO PHQ QUESTIONS 1-9: 0

## 2021-12-01 ASSESSMENT — SOCIAL DETERMINANTS OF HEALTH (SDOH): HOW HARD IS IT FOR YOU TO PAY FOR THE VERY BASICS LIKE FOOD, HOUSING, MEDICAL CARE, AND HEATING?: NOT HARD AT ALL

## 2021-12-01 NOTE — PROGRESS NOTES
2021     Everett Burleson (:  1960) is a 57 y.o. male, here for evaluation of the following medical concerns:    HPI    Adult visit  -need to discuss vaccinations and the need from these  -need to discuss HIV testing and basic labs. -need to discuss healthy eating habits and exercise.   -need to discuss age appropriate screening recommendations  -need to have detailed conversation concerning MELISSA and PSA testing    Today, denied chest pain, sob,n, v, or diarrhea. Review of Systems   Constitutional: Negative for activity change, fatigue, fever and unexpected weight change. HENT: Negative for congestion, ear pain, facial swelling, hearing loss, rhinorrhea, sinus pressure and sore throat. Respiratory: Negative for cough and shortness of breath. Cardiovascular: Negative for chest pain, palpitations and leg swelling. Gastrointestinal: Negative for abdominal pain, anal bleeding, diarrhea, nausea and vomiting. Endocrine: Negative for cold intolerance, heat intolerance, polydipsia and polyphagia. Musculoskeletal: Negative for arthralgias, back pain and gait problem. Skin: Negative for rash. Neurological: Negative for light-headedness and headaches. Psychiatric/Behavioral: Negative for dysphoric mood. The patient is not nervous/anxious. Prior to Visit Medications    Medication Sig Taking?  Authorizing Provider   zoster recombinant adjuvanted vaccine [facility]) 50 MCG/0.5ML SUSR injection Inject 0.5 mLs into the muscle See Admin Instructions 1 dose now and repeat in 2-6 months Yes David Joseph, DO   sildenafil (VIAGRA) 50 MG tablet Take 1 tablet by mouth as needed for Erectile Dysfunction Yes Arsalan Banegas, DO   clobetasol propionate 0.05 % GEL gel  Yes Historical Provider, MD   ketoconazole (NIZORAL) 2 % cream  Yes Historical Provider, MD        Social History     Tobacco Use    Smoking status: Never Smoker    Smokeless tobacco: Never Used    Tobacco comment: smoked 1/20 of a pack per day   Substance Use Topics    Alcohol use: Yes     Alcohol/week: 1.0 standard drink     Types: 1 Cans of beer per week        Vitals:    12/01/21 1016   BP: 122/62   Pulse: 70   SpO2: 96%   Weight: 242 lb 3.2 oz (109.9 kg)     Estimated body mass index is 31.86 kg/m² as calculated from the following:    Height as of 12/11/20: 6' 1.11\" (1.857 m). Weight as of this encounter: 242 lb 3.2 oz (109.9 kg). Physical Exam  Nursing note reviewed. Constitutional:       Appearance: He is well-developed. HENT:      Head: Normocephalic and atraumatic. Right Ear: Tympanic membrane, ear canal and external ear normal.      Left Ear: Tympanic membrane, ear canal and external ear normal.      Nose: Nose normal.      Mouth/Throat:      Mouth: Mucous membranes are moist.   Eyes:      Pupils: Pupils are equal, round, and reactive to light. Neck:      Thyroid: No thyromegaly. Cardiovascular:      Rate and Rhythm: Normal rate and regular rhythm. Heart sounds: No murmur heard. Pulmonary:      Effort: Pulmonary effort is normal.      Breath sounds: Normal breath sounds. No wheezing or rales. Abdominal:      General: Bowel sounds are normal.      Palpations: Abdomen is soft. Tenderness: There is no abdominal tenderness. Musculoskeletal:         General: Normal range of motion. Neurological:      Mental Status: He is alert and oriented to person, place, and time. Mental status is at baseline. Psychiatric:         Behavior: Behavior normal.         Judgment: Judgment normal.         ASSESSMENT/PLAN:  1. Examination, medical, general  discussed vaccinations and he agreed  -discussed HIV testing and basic labs   -discuseds healthy eating habits and exercise and answered questions  -discussed age appropriate screening recommendations  - detailed conversation concerning screening  - CBC  - Hemoglobin A1C  - Lipid Panel  - Comprehensive Metabolic Panel    2.  Erectile dysfunction, unspecified erectile dysfunction type  Take medication as prescribed. Discussed conservative treatment  RTC if no improved. - CBC  - Hemoglobin A1C  - Lipid Panel  - Comprehensive Metabolic Panel    3. Screening PSA (prostate specific antigen)  Labs obtained  Educated on the importance of having this done. Answered questions      No follow-ups on file.

## 2021-12-02 LAB
ESTIMATED AVERAGE GLUCOSE: 114 MG/DL
HBA1C MFR BLD: 5.6 %

## 2021-12-09 RX ORDER — ATORVASTATIN CALCIUM 10 MG/1
10 TABLET, FILM COATED ORAL DAILY
Qty: 30 TABLET | Refills: 3 | Status: SHIPPED | OUTPATIENT
Start: 2021-12-09 | End: 2022-07-28 | Stop reason: SDUPTHER

## 2022-01-25 RX ORDER — LORATADINE 10 MG/1
10 TABLET ORAL DAILY
Qty: 30 TABLET | Refills: 2 | Status: SHIPPED | OUTPATIENT
Start: 2022-01-25 | End: 2022-10-06

## 2022-07-28 RX ORDER — ATORVASTATIN CALCIUM 10 MG/1
10 TABLET, FILM COATED ORAL DAILY
Qty: 30 TABLET | Refills: 3 | Status: SHIPPED | OUTPATIENT
Start: 2022-07-28

## 2022-09-29 ENCOUNTER — OFFICE VISIT (OUTPATIENT)
Dept: ORTHOPEDIC SURGERY | Age: 62
End: 2022-09-29
Payer: COMMERCIAL

## 2022-09-29 VITALS — HEIGHT: 74 IN | WEIGHT: 240 LBS | BODY MASS INDEX: 30.8 KG/M2

## 2022-09-29 DIAGNOSIS — M17.32 POST-TRAUMATIC OSTEOARTHRITIS OF LEFT KNEE: Primary | ICD-10-CM

## 2022-09-29 PROCEDURE — 99203 OFFICE O/P NEW LOW 30 MIN: CPT | Performed by: ORTHOPAEDIC SURGERY

## 2022-09-29 SDOH — HEALTH STABILITY: PHYSICAL HEALTH: ON AVERAGE, HOW MANY DAYS PER WEEK DO YOU ENGAGE IN MODERATE TO STRENUOUS EXERCISE (LIKE A BRISK WALK)?: 6 DAYS

## 2022-09-29 SDOH — HEALTH STABILITY: PHYSICAL HEALTH: ON AVERAGE, HOW MANY MINUTES DO YOU ENGAGE IN EXERCISE AT THIS LEVEL?: 10 MIN

## 2022-09-29 NOTE — PROGRESS NOTES
[facility]    Chief Complaint: Left knee pain    HPI:  Van Bernheim is a 60 y.o. who is here for initial assessment of left knee pain. He has a long-term history of left knee issues dating back to 40 years ago when he injured his knee and tore his ACL. He would go on to have this reconstructed 10 years later with an ACL reconstruction approximately 30 years ago. He has had to modify his activity over time but has had increasing pain especially in the past 2 weeks. He has soreness every day. His pain is slightly better than it was 2 weeks ago. A knee sleeve has helped as has Advil. He walks without assistive device. He is otherwise healthy and denies heart or lung issues, history of blood clots, blood thinners, smoking, diabetes. He has help at home. Patient Active Problem List   Diagnosis    Psoriasis    Abnormal CT scan, chest       ROS:  Constitutional: denies fever, chills, weight loss  MSK: denies pain in other joints, muscle aches  Neurological: denies numbness, tingling, weakness    Exam:  Height 6' 2\" (1.88 m), weight 240 lb (108.9 kg). Appearance: sitting in exam room chair, appears to be in no acute distress, awake and alert  Resp: unlabored breathing on room air  Skin: warm, dry and intact with out erythema or significant increased temperature  Neuro: grossly intact both lower extremities. Intact sensation to light touch. Motor exam 4+ to 5/5 in all major motor groups. Left knee anterior incision just medial to the midline is well-healed. He demonstrates active knee range of motion of 0 to 130 degrees. He is stable to varus valgus stress at full extension and to anterior posterior drawer at 90 degrees of flexion. There is varus deformity and crepitus with active knee range of motion. Motor function and sensation intact distally. Imagin views of the left knee were performed and interpreted today.   He has bone-on-bone osteoarthritis of the medial compartment with periarticular osteophyte formation. There are staples in place in the proximal tibia and a screw in place in the distal femur from prior ACL reconstruction. There is joint space narrowing of the lateral patellofemoral compartments as well. Assessment:  Posttraumatic left knee osteoarthritis with history of ACL reconstruction    Plan:  We discussed the diagnosis and treatment options. He has been treated in the past with a knee brace, therapy exercises, NSAIDs, and continues to have pain. We discussed steroid injections and viscosupplementation versus total knee arthroplasty today. He is further interested in total knee arthroplasty. The operative procedure, alternatives, and risks were discussed in detail with the patient. The risks include but are not limited to: Infection, vessel injury, nerve injury, DVT, pulmonary embolism, implant loosening, need for revision surgery, loss of motion, continued pain. Despite these risks the patient would like to proceed. All questions have been answered and no guarantees have been made. The patient is unable to do further physical therapy due to disabling pain. I discussed with the patient the diagnosis in detail and answered all the questions. The patient verbalized understanding of the plan as it has been described above and is in agreement. He will call if he would like to proceed with total knee arthroplasty. This can be done as an outpatient procedure. Total time spent on today's encounter was at least 33 minutes. This time included reviewing prior notes, radiographs, and lab results when available, reviewing history obtained by medical assistant, performing history and physical exam, reviewing tests/radiographs with the patient, counseling the patient, ordering medications or tests, documentation in the electronic health record, and coordination of care.     This dictation was done with Dragon dictation and may contain mechanical errors related to translation.

## 2022-10-06 ENCOUNTER — PREP FOR PROCEDURE (OUTPATIENT)
Dept: ORTHOPEDIC SURGERY | Age: 62
End: 2022-10-06

## 2022-10-06 ENCOUNTER — TELEPHONE (OUTPATIENT)
Dept: ORTHOPEDIC SURGERY | Age: 62
End: 2022-10-06

## 2022-10-06 DIAGNOSIS — M17.32 POST-TRAUMATIC OSTEOARTHRITIS OF LEFT KNEE: Primary | ICD-10-CM

## 2022-10-06 RX ORDER — LORATADINE 10 MG/1
TABLET ORAL
Qty: 30 TABLET | Refills: 2 | Status: SHIPPED | OUTPATIENT
Start: 2022-10-06

## 2022-10-06 NOTE — TELEPHONE ENCOUNTER
Surgery and/or Procedure Scheduling     Contact Name: [place] Manual Request: L KNEE   Patient Contact Number: [phone]

## 2022-10-06 NOTE — TELEPHONE ENCOUNTER
Last office visit 12/1/2021     Last written      Next office visit scheduled Visit date not found    Requested Prescriptions     Pending Prescriptions Disp Refills    loratadine (CLARITIN) 10 MG tablet [Pharmacy Med Name: LORATADINE 10 MG TABLET] 30 tablet 2     Sig: TAKE ONE TABLET BY MOUTH DAILY FOR 30 DOSES

## 2022-10-10 ENCOUNTER — TELEPHONE (OUTPATIENT)
Dept: ORTHOPEDIC SURGERY | Age: 62
End: 2022-10-10

## 2022-10-20 NOTE — PLAN OF CARE
[address] [place]  Phone: [phone]   Fax:     [phone]        Physical Therapy Certification    Dear Delio Chavez MD,    We had the pleasure of evaluating the following patient for physical therapy services at [facility]. A summary of our findings can be found in the initial assessment below. This includes our plan of care. If you have any questions or concerns regarding these findings, please do not hesitate to contact me at the office phone number checked above. Thank you for the referral.       Physician Signature:_______________________________Date:__________________  By signing above (or electronic signature), therapists plan is approved by physician      Patient: Ramiro Villeda   : 1960   MRN: [id number]  Referring Physician: Delio Chavez MD      Evaluation Date: 2022      Medical Diagnosis Information:  Medical Diagnosis: Post-traumatic osteoarthritis of left knee [M17.32]    Treatment Diagnosis: decreased abilty to ambulate and function                                      Insurance information: PT Insurance Information: bcbs        Precautions/ Contra-indications: none noted   Latex Allergy:  [x]NO      []YES  Preferred Language for Healthcare:   [x]English       []other:    C-SSRS Triggered by Intake questionnaire (Past 2 wk assessment ):   [x] No, Questionnaire did not trigger screening.   [] Yes, Patient intake triggered C-SSRS Screening      [] C-SSRS Screening completed  [] PCP notified via Epic     SUBJECTIVE: Patient is a 59 y/o male with a 40 year hx of left knee pain which has been getting worse over the past month or so. He is scheduled to have a left tka on 22. He c/o constant aching pain in his left knee which is worse with steps, bikes, prolonged walking.   He is a  and has a standing desk. He had an ACL 30 years or so ago. He hopes to have a successful surgery. Relevant Medical History:Additional Pertinent Hx: acl repair left 30 yrs  Functional Outcome Measure: FOTO = 53    Pain Scale: 4/10  Easing factors: rest  Provocative factors: weight bearing     Type: [x]Constant   []Intermittent  []Radiating []Localized []other:     Numbness/Tingling: denies    Occupation/School:. Hospital:    [] Total Hip Replacement [] Right  [] Left  DOS: 12/21/22  [] Not yet scheduled  [x] Total Knee Replacement [] Right  [x] Left    [] Prehab Eval  [] Prehab D/C  [] Post - OP Visit             Weeks from sx [] Post-op D/C    DME ASSESSMENT:   Current available equipment:    [] [address]. [address]       [] Rolling walker      [] 4 wheeled walker     [] Betha Merles     [] Straight cane     [x] Crutches   [] Reacher            [] Sock Aid              [] Shower chair            [] Leg           [] Long handle shoe horn   [] Other:     Equipment needed at discharge from hospital:   [] [address]. [address]       [] Rolling walker      [] 4 wheeled walker     [] Betha Merles     [] Straight cane     [] Crutches   [] Reacher            [] Sock Aid              [] Shower chair            [] Leg           [] Long handle shoe horn   [] Other:       SOCIAL ASSESSMENT:  1. Will you live with someone who can care for you after surgery? [x] Yes  [] No  2. Where is the bathroom located in your post-surgery place of residence? [x] 1st Floor [] 2nd Floor  3. Where is the bedroom located in your post-surgery place of residence? He will sleep downstairs   [x] 1st Floor [] 2nd Floor  4. Do you use community supports (home help, meals-on-wheels, district nurse)? [x] None or 1 per week  [] 2 or more per week  5. How many stairs will you have to climb to get in to your place of residence? [x] Less than 5  [] More than 5  6. Have you had a fall in the past year?    [] Yes  [x] No     Notes: Available PT Visits:  BCBS    BMI:    Height    Weight      FUNCTIONAL ASSESSMENT:   1. Do you use ambulatory aids? [x] None [] Single Point Cane  [] Crutches/Walker/Wheelchair  2. How far on average can you walk? [x] 2 Blocks or more  [] 1-2 Blocks  [] House bound most of the time  3. What is your physical activity level? [] Highly Active [x] Active  [] Somewhat active  [] Sedentary     OBJECTIVE:   Palpation: decreased patellar and proximal tib /fib jnt mob all directions, tight and tender is hams, gastroc, quads, post tib. Quad: good    Functional Mobility/Transfers: ind    Posture: mod to severe genu varus noted    Bandages/Dressings/Incisions: na    Gait: -ambulates with circumduction left, decreased heel strike, stance, push off. Reflexes/Sensation:    [x]Dermatomes/Myotomes intact    [x]Reflexes equal and normal bilaterally   []Other:     PROM AROM    L R L R   Hip Flexion 100 100 95 95   Knee Flexion 130 130 130 130   Knee Extension -20 -5 -10 -5       Strength (0-5) Left Right   Hip Flexion - seated 5 5/5 all   Hip Abduction - sidelyling 4    Hip Extension 4    Quads 4+    Hamstrings 4    Ankle DF 5    Ankle PF 5         Flexibility     Hamstrings (90/90) -30    ITB (Carrie) Tight ,    Quads (Ely's) tight tight   Hip Flexor Trini Esteves) - -        Girth     Mid patella     Suprapatellar         Joint mobility: patellofemoral decreased all directions with moderate crepitus    []Normal    [x]Hypo   []Hyper       Functional testing Prehab        Date    4 week f/u   Date    8 week f/u  Date    D/C  Date    11/9/22      TUG 8      30 second sit to stand 14      6 minute walk na             Balance       Narrow FRITZ 10      Semi tandem 10      Tandem  10      SLS 10             Knee AROM       Knee Extension MMT R/L L=  R=      Hip aBduction MMT R/L L=  R=      WOMAC                              [x] Patient history, allergies, meds reviewed. Medical chart reviewed. See intake form.      Review Of Systems (ROS):  [x]Performed Review of systems (Integumentary, CardioPulmonary, Neurological) by intake and observation. Intake form has been scanned into medical record. Patient has been instructed to contact their primary care physician regarding ROS issues if not already being addressed at this time. Co-morbidities/Complexities (which will affect course of rehabilitation):   []None           Arthritic conditions   []Rheumatoid arthritis (M05.9)  []Osteoarthritis (M19.91)   Cardiovascular conditions   []Hypertension (I10)  []Hyperlipidemia (E78.5)  []Angina pectoris (I20)  []Atherosclerosis (I70)   Musculoskeletal conditions   []Disc pathology   []Congenital spine pathologies   []Prior surgical intervention  []Osteoporosis (M81.8)  []Osteopenia (M85.8)   Endocrine conditions   []Hypothyroid (E03.9)  []Hyperthyroid Gastrointestinal conditions   []Constipation (H27.36)   Metabolic conditions   []Morbid obesity (E66.01)  []Diabetes type 1(E10.65) or 2 (E11.65)   []Neuropathy (G60.9)     Pulmonary conditions   []Asthma (J45)  []Coughing   []COPD (J44.9)   Psychological Disorders  []Anxiety (F41.9)  []Depression (F32.9)   []Other:   [x]Other:   left acl repair 40 years ago       Barriers to/and or personal factors that will affect rehab potential:              []Age  []Sex    []Smoker              []Motivation/Lack of Motivation                        []Co-Morbidities              []Cognitive Function, education/learning barriers              []Environmental, home barriers              []profession/work barriers  []past PT/medical experience  []other:  Justification:     Falls Risk Assessment (30 days):   [x] Falls Risk assessed and no intervention required.   [] Falls Risk assessed and Patient requires intervention due to being higher risk   TUG score (>12s at risk):     [] Falls education provided, including         ASSESSMENT:   Functional Impairments:     [x]Noted lumbar/proximal hip/LE joint hypomobility   [x]Decreased LE functional ROM   [x]Decreased core/proximal hip strength and neuromuscular control   [x]Decreased LE functional strength   [x]Reduced balance/proprioceptive control   []other:      Functional Activity Limitations (from functional questionnaire and intake)   [x]Reduced ability to tolerate prolonged functional positions   [x]Reduced ability or difficulty with changes of positions or transfers between positions   [x]Reduced ability to maintain good posture and demonstrate good body mechanics with sitting, bending, and lifting   [x]Reduced ability to sleep   [x] Reduced ability or tolerance with driving and/or computer work   [x]Reduced ability to perform lifting, carrying tasks   [x]Reduced ability to squat   [x]Reduced ability to forward bend   [x]Reduced ability to ambulate prolonged functional periods/distances/surfaces   [x]Reduced ability to ascend/descend stairs   [x]Reduced ability to run, hop, cut or jump   []other:    Participation Restrictions   [x]Reduced participation in self care activities   [x]Reduced participation in home management activities   [x]Reduced participation in work activities   [x]Reduced participation in social activities. [x]Reduced participation in sport/recreation activities. Classification :    []Signs/symptoms consistent with post-surgical status including decreased ROM, strength and function.    []Signs/symptoms consistent with joint sprain/strain   []Signs/symptoms consistent with patella-femoral syndrome   [x]Signs/symptoms consistent with knee OA/hip OA   []Signs/symptoms consistent with internal derangement of knee/Hip   []Signs/symptoms consistent with functional hip weakness/NMR control      []Signs/symptoms consistent with tendinitis/tendinosis    []signs/symptoms consistent with pathology which may benefit from Dry needling      []other:      Prognosis/Rehab Potential:      []Excellent   [x]Good    []Fair   []Poor    Tolerance of evaluation/treatment:    []Excellent   [x]Good    []Fair   []Poor    Physical Therapy Evaluation Complexity Justification  [x] A history of present problem with:  [x] no personal factors and/or comorbidities that impact the plan of care;  []1-2 personal factors and/or comorbidities that impact the plan of care  []3 personal factors and/or comorbidities that impact the plan of care  [x] An examination of body systems using standardized tests and measures addressing any of the following: body structures and functions (impairments), activity limitations, and/or participation restrictions;:  [x] a total of 1-2 or more elements   [] a total of 3 or more elements   [] a total of 4 or more elements   [x] A clinical presentation with:  [] stable and/or uncomplicated characteristics   [x] evolving clinical presentation with changing characteristics  [] unstable and unpredictable characteristics;   [x] Clinical decision making of [] low, [] moderate, [] high complexity using standardized patient assessment instrument and/or measurable assessment of functional outcome. [x] EVAL (LOW) 69245 (typically 30 minutes face-to-face)  [] EVAL (MOD) 53338 (typically 30 minutes face-to-face)  [] EVAL (HIGH) 33220 (typically 45 minutes face-to-face)  [] RE-EVAL     PLAN:  Frequency/Duration:  1 days per week for 1 Weeks:  Interventions:  [x]  Therapeutic exercise including: strength training, ROM, for Lower extremity and core   [x]  NMR activation and proprioception for LE, Glutes and Core   [x]  Manual therapy as indicated for LE, Hip and spine to include: Dry Needling/IASTM, STM, PROM, Gr I-IV mobilizations, manipulation. [x] Modalities as needed that may include: thermal agents, E-stim, Biofeedback, US, iontophoresis as indicated  [x] Patient education on joint protection, postural re-education, activity modification, progression of HEP.     HEP instruction: written HEP instructions provided and discussed    Patient education: Patient was thoroughly educated on this date regarding prehabilitation goals, importance of PT sessions in improving overall ROM, strength and stability prior to surgery, and how prehabilitation will facilitate improved post-operative outcomes. The patient was educated on and instructed in HEP as listed. The patient was given a detailed handout for exercises to initiate in the hospital post-operatively as well as at home. The discharge plan from the hospital was reviewed with the patient; specifically, to reduce length of hospital stay and to minimize time before reinitiating outpatient physical therapy after surgery. Education regarding early mobility post-operatively in the hospital and emphasis on working with both physical therapy and nursing staff to achieve ambulation goal  was provided. The patient was highly encouraged to attend joint class in hospital prior to surgery for further instructions on pre and post-surgical care. Also, education regarding goals and expectations was provided; specifically, knee flexion range of motion greater than or equal to 90 degrees and 0 degrees knee extension to promote improved gait mechanics and ambulation. The patient was encouraged to utilize ice/cold pack after surgery to address pain, minimize swelling as often as possible. It is in my medical opinion that this patient is clear from all physical barriers prior to consideration for surgery, activity modifications prior to and post operatively have been discussed with this patient as well as discharge planning and is cleared for surgery from physical therapy perspective. Patient demonstrated proper technique, good tolerance,  and was given written instructions for the above exercises         Access Code: [email or web address]  URL: [email or web address]  Date: 11/09/2022  Prepared by: Charles Ca    Exercises  Prone Hip Extension - 1 x daily - 7 x weekly - 3 sets - 10 reps  Sidelying Pelvic Floor Contraction with Hip Abduction - 1 x daily - 7 x weekly - 3 sets - 10 reps  Prone Knee Flexion AROM - 1 x daily - 7 x weekly - 3 sets - 10 reps  Small Range Straight Leg Raise - 1 x daily - 7 x weekly - 3 sets - 10 reps  Supine Piriformis Stretch with Foot on Ground - 1 x daily - 7 x weekly - 3 sets - 10 reps  Standing Hamstring Stretch on Chair - 1 x daily - 7 x weekly - 3 sets - 10 reps  Standing Gastroc Stretch on Step with Counter Support - 1 x daily - 7 x weekly - 3 sets - 10 reps  Single Leg Stance - 1 x daily - 7 x weekly - 3 sets - 10 reps  Seated Knee Flexion Extension AROM - 1 x daily - 7 x weekly - 3 sets - 10 reps  semi squat - 1 x daily - 7 x weekly - 3 sets - 10 reps  Discussed mechanism of injury, anatomy, physiology, biomechanics, sleeping positions,  and strategies to accelerate the healing process. Answered all of patients questions regarding injury. Gave necessary information to get any equipment needed. Gait with cane and rolling walker. Steps with cane  Charges-30 min- 60 total    Eval, TA, exs    GOALS:  Patient stated goal: \" I want to learn some exercises to help with my surgery \"  [] Progressing: [x] Met: [] Not Met: [] Adjusted    Therapist goals for Patient:   Short Term Goals: To be achieved in: 1 visit  1. Independent in HEP and progression per patient tolerance, in order to prevent re-injury. [] Progressing: [x] Met: [] Not Met: [] Adjusted  2. All patient questions regarding expectations for rehab following upcoming surgery are answered.    [] Progressing: [x] Met: [] Not Met: [] Adjusted    Long Term Goals: long term goals to be determined at re-eval following upcoming surgery    Electronically signed by:  Maria Elena Jay PT

## 2022-11-01 ENCOUNTER — HOSPITAL ENCOUNTER (OUTPATIENT)
Dept: PHYSICAL THERAPY | Age: 62
Setting detail: THERAPIES SERIES
Discharge: HOME | End: 2022-11-01
Payer: COMMERCIAL

## 2022-11-01 PROCEDURE — 97161 PT EVAL LOW COMPLEX 20 MIN: CPT

## 2022-11-01 PROCEDURE — 97110 THERAPEUTIC EXERCISES: CPT

## 2022-11-01 PROCEDURE — 97530 THERAPEUTIC ACTIVITIES: CPT

## 2022-11-09 ENCOUNTER — HOSPITAL ENCOUNTER (OUTPATIENT)
Dept: PHYSICAL THERAPY | Age: 62
Setting detail: THERAPIES SERIES
Discharge: HOME | End: 2022-11-09
Payer: COMMERCIAL

## 2022-11-09 PROCEDURE — 97161 PT EVAL LOW COMPLEX 20 MIN: CPT

## 2022-11-09 PROCEDURE — 97110 THERAPEUTIC EXERCISES: CPT

## 2022-11-09 PROCEDURE — 97530 THERAPEUTIC ACTIVITIES: CPT

## 2022-11-29 ENCOUNTER — OFFICE VISIT (OUTPATIENT)
Dept: FAMILY MEDICINE CLINIC | Age: 62
End: 2022-11-29
Payer: COMMERCIAL

## 2022-11-29 VITALS
BODY MASS INDEX: 31.95 KG/M2 | HEIGHT: 74 IN | WEIGHT: 249 LBS | DIASTOLIC BLOOD PRESSURE: 78 MMHG | SYSTOLIC BLOOD PRESSURE: 118 MMHG

## 2022-11-29 DIAGNOSIS — Z01.818 PRE-OP EXAM: Primary | ICD-10-CM

## 2022-11-29 PROCEDURE — 99214 OFFICE O/P EST MOD 30 MIN: CPT | Performed by: FAMILY MEDICINE

## 2022-11-29 ASSESSMENT — ENCOUNTER SYMPTOMS
VOMITING: 0
NAUSEA: 0
RHINORRHEA: 0
COUGH: 0
DIARRHEA: 0
ABDOMINAL PAIN: 0
SORE THROAT: 0
TROUBLE SWALLOWING: 0
SINUS PRESSURE: 0
BACK PAIN: 1
SHORTNESS OF BREATH: 0

## 2022-11-29 ASSESSMENT — PATIENT HEALTH QUESTIONNAIRE - PHQ9
SUM OF ALL RESPONSES TO PHQ QUESTIONS 1-9: 0
2. FEELING DOWN, DEPRESSED OR HOPELESS: 0
SUM OF ALL RESPONSES TO PHQ9 QUESTIONS 1 & 2: 0
1. LITTLE INTEREST OR PLEASURE IN DOING THINGS: 0
SUM OF ALL RESPONSES TO PHQ QUESTIONS 1-9: 0

## 2022-11-29 NOTE — PROGRESS NOTES
2022     Mackenzie Burleson (:  1960) is a 60 y.o. male, here for evaluation of the following medical concerns:    HPI    Total Left knee replacement by Dr. Sohail Rangel. 1.  Cardiac concerns- Ability to climb stairs? Yes, Walk up a hill? Yes, Do heavy lifting around the house? Yes,  Exercise tolerance? yes, History of chest pain, NO or SOB, No, has hx of wheezing , NO,  symptoms of sleep apnea, Yes , Family history of heart disease? No     2. Problems with anesthesia? No, Family history of problems with anesthesia? No, Alcohol use? No.  Tobacco use? No , Drug  Use? No, bleeding disorder, clotting disorders. 3. Patient has several medical conditions that are treated and stable with medication. he feels well today      Today, denied chest pain, sob, n, v, or diarrhea      Review of Systems   Constitutional:  Negative for activity change, fatigue, fever and unexpected weight change. HENT:  Negative for congestion, ear pain, rhinorrhea, sinus pressure, sore throat and trouble swallowing. Eyes:  Negative for visual disturbance. Respiratory:  Negative for cough and shortness of breath. Cardiovascular:  Negative for chest pain, palpitations and leg swelling. Gastrointestinal:  Negative for abdominal pain, diarrhea, nausea and vomiting. Endocrine: Negative for cold intolerance, heat intolerance, polydipsia and polyphagia. Musculoskeletal:  Positive for arthralgias, back pain and gait problem. Skin:  Negative for rash. Neurological:  Negative for dizziness, syncope, light-headedness and headaches. Psychiatric/Behavioral:  Negative for dysphoric mood. The patient is not nervous/anxious. Prior to Visit Medications    Medication Sig Taking? Authorizing Provider   loratadine (CLARITIN) 10 MG tablet TAKE ONE TABLET BY MOUTH DAILY FOR 30 DOSES Yes Arsalan Banegas, DO   atorvastatin (LIPITOR) 10 MG tablet Take 1 tablet by mouth in the morning.  Yes Haresh Nian, DO   sildenafil (VIAGRA) 50 MG tablet Take 1 tablet by mouth as needed for Erectile Dysfunction Yes Arsalan Banegas, DO   clobetasol propionate 0.05 % GEL gel  Yes Historical Provider, MD   ketoconazole (NIZORAL) 2 % cream  Yes Historical Provider, MD        Social History     Tobacco Use    Smoking status: Never    Smokeless tobacco: Never    Tobacco comments:     smoked 1/20 of a pack per day   Substance Use Topics    Alcohol use: Yes     Alcohol/week: 1.0 standard drink     Types: 1 Cans of beer per week        Vitals:    11/29/22 1511   BP: 118/78   Weight: 249 lb (112.9 kg)   Height: 6' 2\" (1.88 m)     Estimated body mass index is 31.97 kg/m² as calculated from the following:    Height as of this encounter: 6' 2\" (1.88 m). Weight as of this encounter: 249 lb (112.9 kg). Physical Exam  Vitals and nursing note reviewed. Constitutional:       Appearance: He is well-developed. HENT:      Head: Normocephalic and atraumatic. Right Ear: External ear normal.      Left Ear: External ear normal.   Eyes:      Pupils: Pupils are equal, round, and reactive to light. Neck:      Thyroid: No thyromegaly. Cardiovascular:      Rate and Rhythm: Normal rate and regular rhythm. Heart sounds: No murmur heard. Pulmonary:      Effort: Pulmonary effort is normal.      Breath sounds: Normal breath sounds. No wheezing or rales. Abdominal:      General: Bowel sounds are normal.      Palpations: Abdomen is soft. Tenderness: There is no abdominal tenderness. Musculoskeletal:         General: Swelling and tenderness present. Cervical back: Neck supple. Lymphadenopathy:      Cervical: No cervical adenopathy. Skin:     Findings: No rash. Neurological:      Mental Status: He is alert and oriented to person, place, and time. Psychiatric:         Behavior: Behavior normal.         Judgment: Judgment normal.       ASSESSMENT/PLAN:  1.  Pre-op exam    After reviewing the patient's hx and medication list along with a baseline ROS and vitals  it appears that the patient is stable today. He doesn't have a hx of complications with surgery or anesthesia. He feels well and believes his chronic medical conditions are stable at this time. His last labs are stable at this time. The patient is medically stable for  Surgery   Return in about 1 year (around 11/29/2023).

## 2022-12-01 ENCOUNTER — TELEPHONE (OUTPATIENT)
Dept: ORTHOPEDIC SURGERY | Age: 62
End: 2022-12-01

## 2022-12-01 NOTE — TELEPHONE ENCOUNTER
Lynsey Plan [id number]    Date: 12/21/22  Type of SX:   INPATIENT DID NOT MEET CRITERIA/CHANGED TO HOSPTIAL OBSERVATION  Location:  [facility]  CPT: 05888   DX Code: M17.12  SX area:  KNEE  Insurance: [facility] BS

## 2022-12-12 ENCOUNTER — HOSPITAL ENCOUNTER (OUTPATIENT)
Dept: PREADMISSION TESTING | Age: 62
Discharge: HOME | End: 2022-12-16
Payer: COMMERCIAL

## 2022-12-12 DIAGNOSIS — M17.32 POST-TRAUMATIC OSTEOARTHRITIS OF LEFT KNEE: ICD-10-CM

## 2022-12-12 LAB
ABO/RH: NORMAL
ALBUMIN SERPL-MCNC: 4.3 G/DL (ref 3.4–5)
ANION GAP SERPL CALCULATED.3IONS-SCNC: 10 MMOL/L (ref 3–16)
ANTIBODY SCREEN: NORMAL
APTT: 30.2 SEC (ref 23–34.3)
BASOPHILS ABSOLUTE: 0 K/UL (ref 0–0.2)
BASOPHILS RELATIVE PERCENT: 0.6 %
BILIRUBIN URINE: NEGATIVE
BLOOD, URINE: NEGATIVE
BUN BLDV-MCNC: 30 MG/DL (ref 7–20)
CALCIUM SERPL-MCNC: 9.5 MG/DL (ref 8.3–10.6)
CHLORIDE BLD-SCNC: 101 MMOL/L (ref 99–110)
CLARITY: CLEAR
CO2: 27 MMOL/L (ref 21–32)
COLOR: YELLOW
CREAT SERPL-MCNC: 1 MG/DL (ref 0.8–1.3)
EKG ATRIAL RATE: 69 BPM
EKG DIAGNOSIS: NORMAL
EKG P AXIS: 53 DEGREES
EKG P-R INTERVAL: 264 MS
EKG Q-T INTERVAL: 394 MS
EKG QRS DURATION: 90 MS
EKG QTC CALCULATION (BAZETT): 422 MS
EKG R AXIS: 69 DEGREES
EKG T AXIS: 39 DEGREES
EKG VENTRICULAR RATE: 69 BPM
EOSINOPHILS ABSOLUTE: 0.3 K/UL (ref 0–0.6)
EOSINOPHILS RELATIVE PERCENT: 6.1 %
ESTIMATED AVERAGE GLUCOSE: 111.2 MG/DL
GFR SERPL CREATININE-BSD FRML MDRD: >60 ML/MIN/{1.73_M2}
GLUCOSE BLD-MCNC: 88 MG/DL (ref 70–99)
GLUCOSE URINE: NEGATIVE MG/DL
HBA1C MFR BLD: 5.5 %
HCT VFR BLD CALC: 43.2 % (ref 40.5–52.5)
HEMOGLOBIN: 14.4 G/DL (ref 13.5–17.5)
INR BLD: 0.99 (ref 0.87–1.14)
KETONES, URINE: NEGATIVE MG/DL
LEUKOCYTE ESTERASE, URINE: NEGATIVE
LYMPHOCYTES ABSOLUTE: 1 K/UL (ref 1–5.1)
LYMPHOCYTES RELATIVE PERCENT: 21.4 %
MCH RBC QN AUTO: 29.2 PG (ref 26–34)
MCHC RBC AUTO-ENTMCNC: 33.2 G/DL (ref 31–36)
MCV RBC AUTO: 87.9 FL (ref 80–100)
MICROSCOPIC EXAMINATION: NORMAL
MONOCYTES ABSOLUTE: 0.5 K/UL (ref 0–1.3)
MONOCYTES RELATIVE PERCENT: 10.3 %
NEUTROPHILS ABSOLUTE: 2.8 K/UL (ref 1.7–7.7)
NEUTROPHILS RELATIVE PERCENT: 61.6 %
NITRITE, URINE: NEGATIVE
PDW BLD-RTO: 14.2 % (ref 12.4–15.4)
PH UA: 5 (ref 5–8)
PLATELET # BLD: 195 K/UL (ref 135–450)
PMV BLD AUTO: 9.3 FL (ref 5–10.5)
POTASSIUM SERPL-SCNC: 4.7 MMOL/L (ref 3.5–5.1)
PREALBUMIN: 28 MG/DL (ref 20–40)
PROTEIN UA: NEGATIVE MG/DL
PROTHROMBIN TIME: 13 SEC (ref 11.7–14.5)
RBC # BLD: 4.91 M/UL (ref 4.2–5.9)
SODIUM BLD-SCNC: 138 MMOL/L (ref 136–145)
SPECIFIC GRAVITY UA: 1.02 (ref 1–1.03)
URINE REFLEX TO CULTURE: NORMAL
URINE TYPE: NORMAL
UROBILINOGEN, URINE: 0.2 E.U./DL
VITAMIN D 25-HYDROXY: 26.1 NG/ML
WBC # BLD: 4.5 K/UL (ref 4–11)

## 2022-12-12 PROCEDURE — 81003 URINALYSIS AUTO W/O SCOPE: CPT

## 2022-12-12 PROCEDURE — 87641 MR-STAPH DNA AMP PROBE: CPT

## 2022-12-12 PROCEDURE — 82040 ASSAY OF SERUM ALBUMIN: CPT

## 2022-12-12 PROCEDURE — 86850 RBC ANTIBODY SCREEN: CPT

## 2022-12-12 PROCEDURE — 85025 COMPLETE CBC W/AUTO DIFF WBC: CPT

## 2022-12-12 PROCEDURE — 93005 ELECTROCARDIOGRAM TRACING: CPT

## 2022-12-12 PROCEDURE — 83036 HEMOGLOBIN GLYCOSYLATED A1C: CPT

## 2022-12-12 PROCEDURE — 93010 ELECTROCARDIOGRAM REPORT: CPT | Performed by: INTERNAL MEDICINE

## 2022-12-12 PROCEDURE — 85610 PROTHROMBIN TIME: CPT

## 2022-12-12 PROCEDURE — 86901 BLOOD TYPING SEROLOGIC RH(D): CPT

## 2022-12-12 PROCEDURE — 80048 BASIC METABOLIC PNL TOTAL CA: CPT

## 2022-12-12 PROCEDURE — 82306 VITAMIN D 25 HYDROXY: CPT

## 2022-12-12 PROCEDURE — 86900 BLOOD TYPING SEROLOGIC ABO: CPT

## 2022-12-12 PROCEDURE — 84134 ASSAY OF PREALBUMIN: CPT

## 2022-12-12 PROCEDURE — 85730 THROMBOPLASTIN TIME PARTIAL: CPT

## 2022-12-12 NOTE — PROGRESS NOTES
Patient attended JET class on 12/12/2022 . Patient verified surgery for Total knee replacement. Patient received patient information and educational JET folder including the following handouts: jet powerpoint, covid-19 restrictions, ERAS, incentive spirometry including purpose and how to perform, case management contact information, hand hygiene, preventing constipation, home health care agency list, skilled nursing facility list, pre-operative showering techniques and the use of anti-septic 3 days before surgery. Interviews completed by PT, OT, and Nurse Navigator. Labs and Tests completed as ordered/necessary. Anti-septic bottle given to patient to take home. Patient states no further questions or concerns. Patient provided orthopedic office, case management, and nurse navigator contact information. Date Of Surgery: 12/21/2022  Surgeon: Dr Mira Torres  Per Patient Will see/Saw PCP on 11/29/2022. HISTORY OF JOINT REPLACEMENT(S): n/a    DC Plan: Home    HOME ASSISTANCE - WHO WILL BE STAYING WITH YOU AT HOME FOR FIRST SEVERAL DAYS? Wife Aaliyah Garcia: Same    STEPS INTO HOME: 4    STEPS TO BATHROOM/BEDROOM: 12-13; able to live on first floor    DME NEEDS:  Denies any DME needs at this time. Patient states they already has a rolling walker. LENGTH OF STAY HAS BEEN DISCUSSED WITH THE PATIENT, APPROPRIATE TO HIS/ HER PROCEDURE. PATIENT HAS BEEN INFORMED THAT THEY WILL BE DISCHARGED WHEN THE PHYSICIAN DEEMS THEM MEDICALLY READY. MOST PATIENTS CAN EXPECT TO BE IN THE HOSPITAL ONE NIGHT AS AN OBSERVATION ONLY, OR 1-2 DAYS AS AN ADMISSION FOR THOSE WITH MEDICAL HEALTH ISSUES/COMPLICATIONS. CHOICES FOR HHC, DME VENDORS AND SKILLED/ REHAB FACILITIES PROVIDED TO PATIENT DURING THIS INTERVIEW. HOME CARE CHOICE(S): open to any agency, hhc list was provided to patient.     MEDS TO BEDS FROM [employer]: Patient is agreeable to have medications filled via meds to beds program.    The Patient was provided with a choice of provider and agrees   with the discharge plan. [x] Yes [] No    Freedom of choice list was provided with basic dialogue that supports the patient's individualized plan of care/goals, treatment preferences and shares the quality data associated with the providers. [x] Yes [] No    Facesheet with discharge needs provided to [address] / .

## 2022-12-13 ENCOUNTER — TELEPHONE (OUTPATIENT)
Dept: ORTHOPEDIC SURGERY | Age: 62
End: 2022-12-13

## 2022-12-13 LAB
MRSA SCREEN RT-PCR: ABNORMAL
ORGANISM: ABNORMAL

## 2022-12-13 NOTE — PROGRESS NOTES
Notification sent to Dr Eva Baldwin and medical assistant Rick ALVARADO regarding abnormal preoperative labs.

## 2022-12-13 NOTE — TELEPHONE ENCOUNTER
[facility] would like to inform Dr. Jaime Persaud that the MRSA test came back positive.

## 2022-12-13 NOTE — TELEPHONE ENCOUNTER
Vitamin D level is low at 26.1. Instructed patient to take over-the-counter Vitamin D [id number] IUs daily.     I called the patient and voicemail not set up

## 2022-12-14 RX ORDER — IBUPROFEN 200 MG
400 TABLET ORAL 2 TIMES DAILY
Status: ON HOLD | COMMUNITY
End: 2022-12-21 | Stop reason: SDUPTHER

## 2022-12-14 NOTE — PROGRESS NOTES
C-Difficile admission screening and protocol:       * Admitted with diarrhea? [] YES    [x]  NO     *Prior history of C-Diff. In last 3 months? [] YES    [x]  NO     *Antibiotic use in the past 6-8 weeks? [x]  NO    []  YES                 If yes, which ANTIBIOTIC AND REASON______     *Prior hospitalization or nursing home in the last month? []  YES    [x]  NO    SAFETY FIRST. .call before you fall       PRE-OP INSTRUCTIONS     Do not eat or drink anything after 12:00 midnight prior to surgery. This includes water, chewing gum, mints and ice chips. You may brush your teeth and gargle the morning of surgery but DO  NOT SWALLOW THE WATER. Take the following medications with a small sip of water on the morning of surgery: Follow your MD/Surgeons pre-procedure instructions regarding your medications     If you use an inhaler, please use it the morning of surgery and bring with you to hospital.    You may be asked to stop blood thinners such as:  Coumadin, Plavix, Fragmin and lovenox. Please check with your doctor before stopping these or any other medications. Aspirin, ibuprofen, advil and naproxen, any anti-inflammatory products should be stopped for a week prior to your surgery. Do not smoke and do not drink any alcoholic beverages 24 hours prior to your surgery. Please do not wear any jewelry or body piercings on the day of surgery. Please wear something simple, loose fitting clothing to the hospital.  Do not wear any make-up(including eye make-up) or nail polish on your fingers and toes. As part of our patient safety program to minimize surgical infections, we ask you to do the followin. Please notify your surgeon if you develop any illness between now                          and the day of your surgery.   This includes a cough, cold, fever, sore                       throat, nausea, vomiting, diarrhea, etc.  Also please notify your surgeon if you experience dizziness, shortness of breath or                       Blurred vision between now and the time of your surgery. 2.  Please notify your surgeon of any open or redden areas that may                        look infected                     3.  DO NOT shave your operative site 96 hours(four days) prior to                                  surgery. 4.  Shower the week before surgery with an antibacterial soap, such as                       dial, safeguard, etc.                         5.  Three(3) days prior to your 12/18,12/19,12/20surgery, cleanse the operative site with                          Hibiclens(anti-microbial soap). This soap may dry your skin, please                          do not apply any oils or lotions     Please bring your insurance card and picture ID day of surgery    If you have a living will or durable power of attorny. Please bring in a copy of your advanced directives. If you have dentures, they will be removed before going to the OR, we will provide you with a container. If you wear contact lenses or glasses, they will be removes, please bring a case for them. Have you seen your family doctor for a pre-op history and physical.      Surgery scheduler will call you 48 hours prior to your surgery to notify you of the time of your surgery and the time you will need to be at hospital...patients are asked to arrive 21/2 hours prior to surgery. Please call Pre-Admission testing if you have any further questions.                   [id number] [facility] [place] testing phone number:  [phone]      Thank You for choosing [facility]!!

## 2022-12-15 NOTE — DISCHARGE INSTR - COC
[facility]) Continuity of Care Form    Patient Name:  Homero Mead  : 1960    MRN:  [id number]    Admit date:  (Not on file)  Discharge date:  2022    Code Status Order: No Order  Advance Directives: No    Admitting Physician: Harrison Miranda MD  PCP: Donna Whitehead DO    Discharging Nurse: [facility] Unit/Room#: No information available for this encounter. Discharging Unit Phone Number: [phone]    Emergency Contact:        Past Surgical History:  Past Surgical History:   Procedure [place] Date    ANTERIOR CRUCIATE LIGAMENT REPAIR Left     APPENDECTOMY      BRONCHOSCOPY  2016    COLONOSCOPY N/A 2020    COLONOSCOPY WITH BIOPSY performed by Jane Lang MD at [place] [address]  2016    Dr Keya Andrews, [facility]        Immunization History:   Immunization History   Administered Date(s) Administered    COVID-19, PFIZER PURPLE top, DILUTE for use, (age 15 y+), 30mcg/0.3mL 2021, 2021    Hepatitis A 1996, 10/29/1999, 2000, 2002    Hepatitis B 10/29/1999, 2000, 2002    Influenza Vaccine, unspecified formulation 2016    Influenza, FLUARIX, FLULAVAL, FLUZONE (age 10 mo+) AND AFLURIA, (age 1 y+), PF, 0.5mL 2018    Td, unspecified formulation 2013    Tetanus 2002       Active Problems:  Active Problems:    * No active hospital problems. *  Resolved Problems:    * No resolved hospital problems.  *      Isolation/Infection:       Nurse Assessment:  Last Vital Signs:Ht 6' 2\" (1.88 m)   Wt 240 lb (108.9 kg)   BMI 30.81 kg/m²   Last documented pain score (0-10 scale):    Last Weight:   Wt Readings from Last 1 Encounters:   22 249 lb (112.9 kg)     Mental Status:  oriented and alert     IV Access:  - None    Nursing Mobility/ADLs:  Walking   Assisted  Transfer  Assisted  Bathing  Assisted  Dressing  Assisted  [address] Independent  Med Delivery   whole    Wound Care Documentation and Therapy:  Keep glued Prineo dressing clean and intact. DO NOT remove. Prineo is waterproof for showering. Doctor will evaluated dressing for removal at office visit 2 weeks after surgery        Elimination:  Urinary Catheter: None   Colostomy/Ileostomy: No  Continence: Bowel: Yes  Bladder: Yes  Date of Last BM: ***    Intake/Output Summary (Last 24 hours)   No intake or output data in the 24 hours ending 12/15/22 2603  Safety Concerns: At Risk for Falls    Impairments/Disabilities:      Vision    Nutrition Therapy:  Current Nutrition Therapy: general  Routes of Feeding: Oral  Liquids: No Restrictions  Daily Fluid Restriction: no  Last Modified Barium Swallow with Video (Video Swallowing Test): not done    Treatments at the Time of Hospital Discharge:   Respiratory Treatments:   Oxygen Therapy:  is not on home oxygen therapy.   Ventilator:    - No ventilator support    Lab orders for discharge:        Rehab Therapies: Physical Therapy, Occupational Therapy and nursing care  Weight Bearing Status/Restrictions: No weight bearing restirctions  Other Medical Equipment (for information only, NOT a DME order):  Rolling walker  Other Treatments: ASA 81mg twice at day for 14 days for DVT prophylaxis , bilateral ALBERTO hose for 2 weeks after surgery  HOME HEALTH CARE: LEVEL 3 [address] to establish plan of care for patient over 60 day period   Nursing  Initial home SN evaluation visit to occur within 24-48 hours for:  1)  medication management  2)  VS and clinical assessment  3)  S&S chronic disease exacerbation education + when to contact MD/NP  4)  care coordination  Medication Reconciliation during 1st SN visit  PT/OT/Speech   Evaluations in home within 24-48 hours of discharge to include DME and home safety   Frontload therapy 5 days, then 3x a week   OT to evaluate if patient has [address] needs for personal care   Social Worker evaluation within 24-48 hours to evaluate resources [facility] for potential [place], [place], LTC, and Medicaid options   Palliative Care referral within 5 days of hospital discharge   PCP Visit scheduled within 3 - 7 days of hospital discharge    [address] (If patient is agreeable and meets guidelines)      Patient's personal belongings (please select all that are sent with patient):  Glasses    RN SIGNATURE:  Electronically signed by Heber Crump RN on 12/21/22 at 2:13 PM EST    PHYSICIAN SECTION    Prognosis: Good    Condition at Discharge: Stable    Rehab Potential (if transferring to Rehab): Good    Physician Certification: I certify the above orders, information, and transfer of Kyra Fitting is necessary for the continuing treatment of the diagnosis listed and that he requires [address] for less 30 days. Update Admission H&P: No change in H&P    PHYSICIAN SIGNATURE:  Electronically signed by SHERRELL Yang CNP on 12/15/22 at 12:48 PM EST/ Dr Romeo Salinas Date: ***    [facility]er Readmission Risk Assessment Score:    Discharging to Facility/ Agency   Name:     [facility]  Address:  [address] [place] [address],  [address], [address] [place] [address]  Phone:     [phone]  Fax:         [phone]      Dialysis Facility (if applicable)   Name:  Address:  Dialysis Schedule:  Phone:  Fax:    / signature: {Esignature:[id number]}          Followup with Dr Farzaneh Montemayor 2 weeks after surgery in office   [phone]  [facility], [address],   [phone]     DISCHARGE INSTRUCTIONS FOR TOTAL KNEE REPLACEMENT  Activity:  Elevate your leg if swelling occurs in your ankle. Use elastic wraps/hose until swelling decreases. Continue the exercise program as prescribed by physical therapists. Take frequent walks.   Use walker, crutches, or cane with weight bearing instructions as indicated by the physical therapists. Take rest periods often. Elevate leg during rest period. Wound Care:  Cover the wound with a sterile gauze dressing and change daily as long as there is drainage. Do not scrub wound. Pat it dry with a soft towel. Dont apply any lotions or creams to your wound. Check the incision every day for redness, swelling, or increase in drainage. Diet:  You can resume your normal diet. There are no limits on your diet due to your surgery. Pain pills and activity changes may lead to constipation. To prevent this, use prune juice or bran cereals liberally. You may need to use a laxative such as Dulcolax, Senokot, or Milk of Magnesia. Drink plenty of fluids. Medications: Take pain pills if needed to maintain comfort. Never drive while taking pain medicine. Avoid over the counter medications until checking with your doctor. Resume previous medications as instructed by your doctor. You will be on aspirin or Eliquis  for 14 days only. Stay off other anti-inflammatory medications (except Celebrex)  Call Your Doctor If:  You have increased pain not controlled by medications. Excessive swelling in your ankle. You develop numbness, tingling, or decreased movement. You have a fever greater than 100 degrees for a day or over 101 degrees at any one time. Your wound becomes more reddened, starts draining, or opens. If you fall. You have any questions about your recovery. Inform your family doctor/dentist or any other doctor who cares for you in the future that you have a joint replacement. You may need antibiotics for dental or surgical procedures if there is any evidence of infection present. If you have required the use of insulin to control your blood sugar after surgery, follow up with your family doctor. Call your surgeons office to schedule your appointment to be seen after surgery.   Make your appointment to continue physical therapy per doctors orders.   Smoking cessation assistance can be obtained from your family doctor or by calling [place] @ [phone]    _______________________________   _____   _______________________  ____                Patient Signature              Date      Witness                               Date

## 2022-12-16 ENCOUNTER — OFFICE VISIT (OUTPATIENT)
Dept: ORTHOPEDIC SURGERY | Age: 62
End: 2022-12-16
Payer: COMMERCIAL

## 2022-12-16 VITALS — WEIGHT: 240 LBS | BODY MASS INDEX: 30.8 KG/M2 | RESPIRATION RATE: 16 BRPM | HEIGHT: 74 IN

## 2022-12-16 DIAGNOSIS — M17.32 POST-TRAUMATIC OSTEOARTHRITIS OF LEFT KNEE: Primary | ICD-10-CM

## 2022-12-16 PROCEDURE — 99214 OFFICE O/P EST MOD 30 MIN: CPT | Performed by: ORTHOPAEDIC SURGERY

## 2022-12-16 NOTE — PROGRESS NOTES
[facility]  [facility]    Chief Complaint: Left knee pain    HPI:  Andreia Burton is a 60 y.o. who is here in follow-up of left knee pain. He is scheduled for left total knee arthroplasty next week. For review, he has a long-term history of left knee issues dating back to 40 years ago when he injured his knee and tore his ACL. He would go on to have this reconstructed 10 years later with an ACL reconstruction approximately 30 years ago. He has had to modify his activity over time but has had increasing pain especially in the past 2 weeks. He has soreness every day. His pain is slightly better than it was 2 weeks ago. A knee sleeve has helped as has Advil. He walks without assistive device. He is otherwise healthy and denies heart or lung issues, history of blood clots, blood thinners, smoking, diabetes. He has help at home. Patient Active Problem List   Diagnosis    Psoriasis    Abnormal CT scan, chest       ROS:  Constitutional: denies fever, chills, weight loss  MSK: denies pain in other joints, muscle aches  Neurological: denies numbness, tingling, weakness    Exam:  Resp. rate 16, height 6' 2\" (1.88 m), weight 240 lb (108.9 kg). Appearance: sitting in exam room chair, appears to be in no acute distress, awake and alert  Resp: unlabored breathing on room air  Skin: warm, dry and intact with out erythema or significant increased temperature  Neuro: grossly intact both lower extremities. Intact sensation to light touch. Motor exam 4+ to 5/5 in all major motor groups. Left knee anterior incision just medial to the midline is well-healed. He demonstrates active knee range of motion of 0 to 130 degrees. He is stable to varus valgus stress at full extension and to anterior posterior drawer at 90 degrees of flexion. There is varus deformity and crepitus with active knee range of motion. Motor function and sensation intact distally.     Imaging:  Prior left knee radiographs were reviewed today. He has bone-on-bone osteoarthritis of the medial compartment with periarticular osteophyte formation. There are staples in place in the proximal tibia and a screw in place in the distal femur from prior ACL reconstruction. There is joint space narrowing of the lateral patellofemoral compartments as well. 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 The operative procedure, alternatives, and risks were discussed in detail with the patient. The risks include but are not limited to: Infection, vessel injury, nerve injury, DVT, pulmonary embolism, implant loosening, need for revision surgery, loss of motion, continued pain. Despite these risks the patient would like to proceed. All questions have been answered and no guarantees have been made. The patient is unable to do further physical therapy due to disabling pain. I discussed with the patient the diagnosis in detail and answered all the questions. The patient verbalized understanding of the plan as it has been described above and is in agreement. Plan for left total knee arthroplasty. Total time spent on today's encounter was at least 31 minutes. This time included reviewing prior notes, radiographs, and lab results when available, reviewing history obtained by medical assistant, performing history and physical exam, reviewing tests/radiographs with the patient, counseling the patient, ordering medications or tests, documentation in the electronic health record, and coordination of care. This dictation was done with Dragon dictation and may contain mechanical errors related to translation.

## 2022-12-19 ENCOUNTER — PREP FOR PROCEDURE (OUTPATIENT)
Dept: ORTHOPEDICS UNIT | Age: 62
End: 2022-12-19

## 2022-12-19 RX ORDER — OXYCODONE HYDROCHLORIDE 10 MG/1
10 TABLET ORAL ONCE
Status: CANCELLED | OUTPATIENT
Start: 2022-12-19 | End: 2022-12-19

## 2022-12-19 RX ORDER — MELOXICAM 7.5 MG/1
7.5 TABLET ORAL ONCE
Status: CANCELLED | OUTPATIENT
Start: 2022-12-19 | End: 2022-12-19

## 2022-12-19 RX ORDER — TRANEXAMIC ACID 650 MG/1
1950 TABLET ORAL ONCE
Status: CANCELLED | OUTPATIENT
Start: 2022-12-19 | End: 2022-12-19

## 2022-12-20 ENCOUNTER — ANESTHESIA EVENT (OUTPATIENT)
Dept: OPERATING ROOM | Age: 62
End: 2022-12-20
Payer: COMMERCIAL

## 2022-12-21 ENCOUNTER — APPOINTMENT (OUTPATIENT)
Dept: GENERAL RADIOLOGY | Age: 62
End: 2022-12-21
Attending: ORTHOPAEDIC SURGERY
Payer: COMMERCIAL

## 2022-12-21 ENCOUNTER — HOSPITAL ENCOUNTER (OUTPATIENT)
Age: 62
Setting detail: OUTPATIENT SURGERY
Discharge: HOME | End: 2022-12-21
Attending: ORTHOPAEDIC SURGERY | Admitting: ORTHOPAEDIC SURGERY
Payer: COMMERCIAL

## 2022-12-21 ENCOUNTER — ANESTHESIA (OUTPATIENT)
Dept: OPERATING ROOM | Age: 62
End: 2022-12-21
Payer: COMMERCIAL

## 2022-12-21 VITALS
HEART RATE: 85 BPM | OXYGEN SATURATION: 92 % | HEIGHT: 74 IN | WEIGHT: 240 LBS | BODY MASS INDEX: 30.8 KG/M2 | DIASTOLIC BLOOD PRESSURE: 69 MMHG | RESPIRATION RATE: 10 BRPM | TEMPERATURE: 98 F | SYSTOLIC BLOOD PRESSURE: 148 MMHG

## 2022-12-21 DIAGNOSIS — M17.12 PRIMARY OSTEOARTHRITIS OF LEFT KNEE: Primary | ICD-10-CM

## 2022-12-21 LAB
ABO/RH: NORMAL
ANTIBODY SCREEN: NORMAL
GLUCOSE BLD-MCNC: 100 MG/DL (ref 70–99)
MRSA SCREEN RT-PCR: ABNORMAL
ORGANISM: ABNORMAL
PERFORMED ON: ABNORMAL

## 2022-12-21 PROCEDURE — 6370000000 HC RX 637 (ALT 250 FOR IP): Performed by: ORTHOPAEDIC SURGERY

## 2022-12-21 PROCEDURE — 27447 TOTAL KNEE ARTHROPLASTY: CPT | Performed by: ORTHOPAEDIC SURGERY

## 2022-12-21 PROCEDURE — 3700000000 HC ANESTHESIA ATTENDED CARE: Performed by: ORTHOPAEDIC SURGERY

## 2022-12-21 PROCEDURE — 2580000003 HC RX 258: Performed by: NURSE PRACTITIONER

## 2022-12-21 PROCEDURE — 6360000002 HC RX W HCPCS: Performed by: ORTHOPAEDIC SURGERY

## 2022-12-21 PROCEDURE — 7100000011 HC PHASE II RECOVERY - ADDTL 15 MIN: Performed by: ORTHOPAEDIC SURGERY

## 2022-12-21 PROCEDURE — 6360000002 HC RX W HCPCS: Performed by: ANESTHESIOLOGY

## 2022-12-21 PROCEDURE — A4217 STERILE WATER/SALINE, 500 ML: HCPCS | Performed by: ORTHOPAEDIC SURGERY

## 2022-12-21 PROCEDURE — 20985 CPTR-ASST DIR MS PX: CPT | Performed by: ORTHOPAEDIC SURGERY

## 2022-12-21 PROCEDURE — 3600000015 HC SURGERY LEVEL 5 ADDTL 15MIN: Performed by: ORTHOPAEDIC SURGERY

## 2022-12-21 PROCEDURE — 3700000001 HC ADD 15 MINUTES (ANESTHESIA): Performed by: ORTHOPAEDIC SURGERY

## 2022-12-21 PROCEDURE — C9290 INJ, BUPIVACAINE LIPOSOME: HCPCS | Performed by: ORTHOPAEDIC SURGERY

## 2022-12-21 PROCEDURE — C1776 JOINT DEVICE (IMPLANTABLE): HCPCS | Performed by: ORTHOPAEDIC SURGERY

## 2022-12-21 PROCEDURE — 2580000003 HC RX 258: Performed by: ANESTHESIOLOGY

## 2022-12-21 PROCEDURE — 73560 X-RAY EXAM OF KNEE 1 OR 2: CPT

## 2022-12-21 PROCEDURE — 97530 THERAPEUTIC ACTIVITIES: CPT

## 2022-12-21 PROCEDURE — 86900 BLOOD TYPING SEROLOGIC ABO: CPT

## 2022-12-21 PROCEDURE — 6360000002 HC RX W HCPCS: Performed by: NURSE ANESTHETIST, CERTIFIED REGISTERED

## 2022-12-21 PROCEDURE — 7100000010 HC PHASE II RECOVERY - FIRST 15 MIN: Performed by: ORTHOPAEDIC SURGERY

## 2022-12-21 PROCEDURE — 2720000010 HC SURG SUPPLY STERILE: Performed by: ORTHOPAEDIC SURGERY

## 2022-12-21 PROCEDURE — 2500000003 HC RX 250 WO HCPCS: Performed by: NURSE ANESTHETIST, CERTIFIED REGISTERED

## 2022-12-21 PROCEDURE — 7100000001 HC PACU RECOVERY - ADDTL 15 MIN: Performed by: ORTHOPAEDIC SURGERY

## 2022-12-21 PROCEDURE — 6360000002 HC RX W HCPCS: Performed by: NURSE PRACTITIONER

## 2022-12-21 PROCEDURE — 27447 TOTAL KNEE ARTHROPLASTY: CPT | Performed by: PHYSICIAN ASSISTANT

## 2022-12-21 PROCEDURE — 6370000000 HC RX 637 (ALT 250 FOR IP): Performed by: NURSE PRACTITIONER

## 2022-12-21 PROCEDURE — 87641 MR-STAPH DNA AMP PROBE: CPT

## 2022-12-21 PROCEDURE — 64447 NJX AA&/STRD FEMORAL NRV IMG: CPT | Performed by: ANESTHESIOLOGY

## 2022-12-21 PROCEDURE — 86850 RBC ANTIBODY SCREEN: CPT

## 2022-12-21 PROCEDURE — 86901 BLOOD TYPING SEROLOGIC RH(D): CPT

## 2022-12-21 PROCEDURE — 97162 PT EVAL MOD COMPLEX 30 MIN: CPT

## 2022-12-21 PROCEDURE — 97535 SELF CARE MNGMENT TRAINING: CPT

## 2022-12-21 PROCEDURE — 2580000003 HC RX 258: Performed by: ORTHOPAEDIC SURGERY

## 2022-12-21 PROCEDURE — 97166 OT EVAL MOD COMPLEX 45 MIN: CPT

## 2022-12-21 PROCEDURE — 2709999900 HC NON-CHARGEABLE SUPPLY: Performed by: ORTHOPAEDIC SURGERY

## 2022-12-21 PROCEDURE — 3600000005 HC SURGERY LEVEL 5 BASE: Performed by: ORTHOPAEDIC SURGERY

## 2022-12-21 PROCEDURE — 97116 GAIT TRAINING THERAPY: CPT

## 2022-12-21 PROCEDURE — 7100000000 HC PACU RECOVERY - FIRST 15 MIN: Performed by: ORTHOPAEDIC SURGERY

## 2022-12-21 PROCEDURE — 97110 THERAPEUTIC EXERCISES: CPT

## 2022-12-21 DEVICE — BASEPLATE TIB SZ 8 AP60MM ML85MM KEEL W58MM D28MM PEG L12MM: Type: IMPLANTABLE DEVICE | Site: KNEE | Status: FUNCTIONAL

## 2022-12-21 DEVICE — IMPLANTABLE DEVICE: Type: IMPLANTABLE DEVICE | Site: KNEE | Status: FUNCTIONAL

## 2022-12-21 DEVICE — IMPL KNEE PATELLA 38MM ASYMMETRIC TRITAN: Type: IMPLANTABLE DEVICE | Site: KNEE | Status: FUNCTIONAL

## 2022-12-21 RX ORDER — SODIUM CHLORIDE 9 MG/ML
INJECTION, SOLUTION INTRAVENOUS PRN
Status: DISCONTINUED | OUTPATIENT
Start: 2022-12-21 | End: 2022-12-21 | Stop reason: HOSPADM

## 2022-12-21 RX ORDER — LORAZEPAM 2 MG/ML
0.5 INJECTION INTRAMUSCULAR
Status: DISCONTINUED | OUTPATIENT
Start: 2022-12-21 | End: 2022-12-21 | Stop reason: HOSPADM

## 2022-12-21 RX ORDER — OXYCODONE HYDROCHLORIDE 5 MG/1
5-10 TABLET ORAL EVERY 6 HOURS PRN
Qty: 40 TABLET | Refills: 0 | Status: SHIPPED | OUTPATIENT
Start: 2022-12-21 | End: 2022-12-27 | Stop reason: SDUPTHER

## 2022-12-21 RX ORDER — FENTANYL CITRATE 50 UG/ML
50 INJECTION, SOLUTION INTRAMUSCULAR; INTRAVENOUS EVERY 5 MIN PRN
Status: DISCONTINUED | OUTPATIENT
Start: 2022-12-21 | End: 2022-12-21 | Stop reason: HOSPADM

## 2022-12-21 RX ORDER — ACETAMINOPHEN 500 MG
1000 TABLET ORAL ONCE
Status: COMPLETED | OUTPATIENT
Start: 2022-12-21 | End: 2022-12-21

## 2022-12-21 RX ORDER — PHENYLEPHRINE HCL IN 0.9% NACL 1 MG/10 ML
SYRINGE (ML) INTRAVENOUS PRN
Status: DISCONTINUED | OUTPATIENT
Start: 2022-12-21 | End: 2022-12-21 | Stop reason: SDUPTHER

## 2022-12-21 RX ORDER — ROPIVACAINE HYDROCHLORIDE 5 MG/ML
INJECTION, SOLUTION EPIDURAL; INFILTRATION; PERINEURAL
Status: COMPLETED | OUTPATIENT
Start: 2022-12-21 | End: 2022-12-21

## 2022-12-21 RX ORDER — SODIUM CHLORIDE 9 MG/ML
INJECTION, SOLUTION INTRAVENOUS CONTINUOUS
Status: DISCONTINUED | OUTPATIENT
Start: 2022-12-21 | End: 2022-12-21 | Stop reason: HOSPADM

## 2022-12-21 RX ORDER — LIDOCAINE HYDROCHLORIDE 20 MG/ML
INJECTION, SOLUTION EPIDURAL; INFILTRATION; INTRACAUDAL; PERINEURAL PRN
Status: DISCONTINUED | OUTPATIENT
Start: 2022-12-21 | End: 2022-12-21 | Stop reason: SDUPTHER

## 2022-12-21 RX ORDER — DEXAMETHASONE SODIUM PHOSPHATE 4 MG/ML
INJECTION, SOLUTION INTRA-ARTICULAR; INTRALESIONAL; INTRAMUSCULAR; INTRAVENOUS; SOFT TISSUE PRN
Status: DISCONTINUED | OUTPATIENT
Start: 2022-12-21 | End: 2022-12-21 | Stop reason: SDUPTHER

## 2022-12-21 RX ORDER — ASPIRIN 81 MG/1
81 TABLET ORAL 2 TIMES DAILY
Qty: 28 TABLET | Refills: 0 | Status: SHIPPED | OUTPATIENT
Start: 2022-12-21 | End: 2023-01-04

## 2022-12-21 RX ORDER — OXYCODONE HYDROCHLORIDE 5 MG/1
5 TABLET ORAL ONCE
Status: COMPLETED | OUTPATIENT
Start: 2022-12-21 | End: 2022-12-21

## 2022-12-21 RX ORDER — OXYCODONE HYDROCHLORIDE 10 MG/1
10 TABLET ORAL ONCE
Status: COMPLETED | OUTPATIENT
Start: 2022-12-21 | End: 2022-12-21

## 2022-12-21 RX ORDER — IBUPROFEN 200 MG
400 TABLET ORAL 2 TIMES DAILY
Qty: 120 TABLET | Refills: 3
Start: 2022-12-21

## 2022-12-21 RX ORDER — FENTANYL CITRATE 50 UG/ML
INJECTION, SOLUTION INTRAMUSCULAR; INTRAVENOUS PRN
Status: DISCONTINUED | OUTPATIENT
Start: 2022-12-21 | End: 2022-12-21 | Stop reason: SDUPTHER

## 2022-12-21 RX ORDER — SODIUM CHLORIDE 9 MG/ML
INJECTION, SOLUTION INTRAVENOUS PRN
Status: DISCONTINUED | OUTPATIENT
Start: 2022-12-21 | End: 2022-12-21

## 2022-12-21 RX ORDER — MIDAZOLAM HYDROCHLORIDE 1 MG/ML
INJECTION INTRAMUSCULAR; INTRAVENOUS PRN
Status: DISCONTINUED | OUTPATIENT
Start: 2022-12-21 | End: 2022-12-21 | Stop reason: SDUPTHER

## 2022-12-21 RX ORDER — PROPOFOL 10 MG/ML
INJECTION, EMULSION INTRAVENOUS PRN
Status: DISCONTINUED | OUTPATIENT
Start: 2022-12-21 | End: 2022-12-21 | Stop reason: SDUPTHER

## 2022-12-21 RX ORDER — MELOXICAM 7.5 MG/1
7.5 TABLET ORAL ONCE
Status: COMPLETED | OUTPATIENT
Start: 2022-12-21 | End: 2022-12-21

## 2022-12-21 RX ORDER — SODIUM CHLORIDE 0.9 % (FLUSH) 0.9 %
5-40 SYRINGE (ML) INJECTION PRN
Status: DISCONTINUED | OUTPATIENT
Start: 2022-12-21 | End: 2022-12-21

## 2022-12-21 RX ORDER — MEPERIDINE HYDROCHLORIDE 25 MG/ML
12.5 INJECTION INTRAMUSCULAR; INTRAVENOUS; SUBCUTANEOUS
Status: DISCONTINUED | OUTPATIENT
Start: 2022-12-21 | End: 2022-12-21 | Stop reason: HOSPADM

## 2022-12-21 RX ORDER — SODIUM CHLORIDE 0.9 % (FLUSH) 0.9 %
5-40 SYRINGE (ML) INJECTION EVERY 12 HOURS SCHEDULED
Status: DISCONTINUED | OUTPATIENT
Start: 2022-12-21 | End: 2022-12-21 | Stop reason: HOSPADM

## 2022-12-21 RX ORDER — HALOPERIDOL 5 MG/ML
1 INJECTION INTRAMUSCULAR
Status: DISCONTINUED | OUTPATIENT
Start: 2022-12-21 | End: 2022-12-21 | Stop reason: HOSPADM

## 2022-12-21 RX ORDER — DIPHENHYDRAMINE HYDROCHLORIDE 50 MG/ML
12.5 INJECTION INTRAMUSCULAR; INTRAVENOUS
Status: DISCONTINUED | OUTPATIENT
Start: 2022-12-21 | End: 2022-12-21 | Stop reason: HOSPADM

## 2022-12-21 RX ORDER — TRANEXAMIC ACID 650 MG/1
1950 TABLET ORAL ONCE
Status: COMPLETED | OUTPATIENT
Start: 2022-12-21 | End: 2022-12-21

## 2022-12-21 RX ORDER — SODIUM CHLORIDE 0.9 % (FLUSH) 0.9 %
5-40 SYRINGE (ML) INJECTION PRN
Status: DISCONTINUED | OUTPATIENT
Start: 2022-12-21 | End: 2022-12-21 | Stop reason: HOSPADM

## 2022-12-21 RX ORDER — DEXAMETHASONE SODIUM PHOSPHATE 4 MG/ML
INJECTION, SOLUTION INTRA-ARTICULAR; INTRALESIONAL; INTRAMUSCULAR; INTRAVENOUS; SOFT TISSUE
Status: DISCONTINUED
Start: 2022-12-21 | End: 2022-12-21 | Stop reason: HOSPADM

## 2022-12-21 RX ORDER — CEPHALEXIN 500 MG/1
500 CAPSULE ORAL SEE ADMIN INSTRUCTIONS
Qty: 2 CAPSULE | Refills: 0 | Status: SHIPPED | OUTPATIENT
Start: 2022-12-21

## 2022-12-21 RX ORDER — SODIUM CHLORIDE 0.9 % (FLUSH) 0.9 %
5-40 SYRINGE (ML) INJECTION EVERY 12 HOURS SCHEDULED
Status: DISCONTINUED | OUTPATIENT
Start: 2022-12-21 | End: 2022-12-21

## 2022-12-21 RX ORDER — BUPIVACAINE HYDROCHLORIDE 5 MG/ML
INJECTION, SOLUTION EPIDURAL; INTRACAUDAL
Status: DISCONTINUED
Start: 2022-12-21 | End: 2022-12-21 | Stop reason: HOSPADM

## 2022-12-21 RX ORDER — ONDANSETRON 2 MG/ML
4 INJECTION INTRAMUSCULAR; INTRAVENOUS
Status: DISCONTINUED | OUTPATIENT
Start: 2022-12-21 | End: 2022-12-21 | Stop reason: HOSPADM

## 2022-12-21 RX ORDER — ONDANSETRON 2 MG/ML
INJECTION INTRAMUSCULAR; INTRAVENOUS PRN
Status: DISCONTINUED | OUTPATIENT
Start: 2022-12-21 | End: 2022-12-21 | Stop reason: SDUPTHER

## 2022-12-21 RX ORDER — MELOXICAM 7.5 MG/1
7.5 TABLET ORAL DAILY
Qty: 5 TABLET | Refills: 0 | Status: SHIPPED | OUTPATIENT
Start: 2022-12-21 | End: 2022-12-26

## 2022-12-21 RX ORDER — CEPHALEXIN 500 MG/1
500 CAPSULE ORAL SEE ADMIN INSTRUCTIONS
Qty: 2 CAPSULE | Refills: 0 | Status: SHIPPED | OUTPATIENT
Start: 2022-12-21 | End: 2022-12-21 | Stop reason: SDUPTHER

## 2022-12-21 RX ORDER — ROCURONIUM BROMIDE 10 MG/ML
INJECTION, SOLUTION INTRAVENOUS PRN
Status: DISCONTINUED | OUTPATIENT
Start: 2022-12-21 | End: 2022-12-21 | Stop reason: SDUPTHER

## 2022-12-21 RX ADMIN — HYDROMORPHONE HYDROCHLORIDE 1 MG: 1 INJECTION, SOLUTION INTRAMUSCULAR; INTRAVENOUS; SUBCUTANEOUS at 09:25

## 2022-12-21 RX ADMIN — FENTANYL CITRATE 50 MCG: 50 INJECTION INTRAMUSCULAR; INTRAVENOUS at 09:58

## 2022-12-21 RX ADMIN — SUGAMMADEX 200 MG: 100 INJECTION, SOLUTION INTRAVENOUS at 09:12

## 2022-12-21 RX ADMIN — Medication 100 MCG: at 08:02

## 2022-12-21 RX ADMIN — ROCURONIUM BROMIDE 50 MG: 10 INJECTION INTRAVENOUS at 07:55

## 2022-12-21 RX ADMIN — TRANEXAMIC ACID 1950 MG: 650 TABLET ORAL at 06:37

## 2022-12-21 RX ADMIN — ACETAMINOPHEN 1000 MG: 500 TABLET ORAL at 13:56

## 2022-12-21 RX ADMIN — CEFAZOLIN 2000 MG: 2 INJECTION, POWDER, FOR SOLUTION INTRAMUSCULAR; INTRAVENOUS at 08:04

## 2022-12-21 RX ADMIN — ONDANSETRON 4 MG: 2 INJECTION INTRAMUSCULAR; INTRAVENOUS at 09:05

## 2022-12-21 RX ADMIN — VANCOMYCIN HYDROCHLORIDE 1500 MG: 1.5 INJECTION, POWDER, LYOPHILIZED, FOR SOLUTION INTRAVENOUS at 06:41

## 2022-12-21 RX ADMIN — ROPIVACAINE HYDROCHLORIDE 20 ML: 5 INJECTION, SOLUTION EPIDURAL; INFILTRATION; PERINEURAL at 07:15

## 2022-12-21 RX ADMIN — HYDROMORPHONE HYDROCHLORIDE 0.5 MG: 1 INJECTION, SOLUTION INTRAMUSCULAR; INTRAVENOUS; SUBCUTANEOUS at 08:08

## 2022-12-21 RX ADMIN — LIDOCAINE HYDROCHLORIDE 80 MG: 20 INJECTION, SOLUTION EPIDURAL; INFILTRATION; INTRACAUDAL; PERINEURAL at 07:55

## 2022-12-21 RX ADMIN — PROPOFOL 200 MG: 10 INJECTION, EMULSION INTRAVENOUS at 07:55

## 2022-12-21 RX ADMIN — OXYCODONE 5 MG: 5 TABLET ORAL at 16:07

## 2022-12-21 RX ADMIN — SODIUM CHLORIDE: 9 INJECTION, SOLUTION INTRAVENOUS at 06:50

## 2022-12-21 RX ADMIN — FENTANYL CITRATE 50 MCG: 50 INJECTION INTRAMUSCULAR; INTRAVENOUS at 07:10

## 2022-12-21 RX ADMIN — HYDROMORPHONE HYDROCHLORIDE 0.5 MG: 1 INJECTION, SOLUTION INTRAMUSCULAR; INTRAVENOUS; SUBCUTANEOUS at 08:33

## 2022-12-21 RX ADMIN — DEXAMETHASONE SODIUM PHOSPHATE 8 MG: 4 INJECTION, SOLUTION INTRAMUSCULAR; INTRAVENOUS at 08:02

## 2022-12-21 RX ADMIN — CEFAZOLIN 2000 MG: 2 INJECTION, POWDER, FOR SOLUTION INTRAMUSCULAR; INTRAVENOUS at 14:03

## 2022-12-21 RX ADMIN — OXYCODONE HYDROCHLORIDE 10 MG: 10 TABLET ORAL at 06:37

## 2022-12-21 RX ADMIN — MIDAZOLAM 2 MG: 1 INJECTION INTRAMUSCULAR; INTRAVENOUS at 07:10

## 2022-12-21 RX ADMIN — FENTANYL CITRATE 50 MCG: 50 INJECTION INTRAMUSCULAR; INTRAVENOUS at 07:53

## 2022-12-21 RX ADMIN — FENTANYL CITRATE 50 MCG: 50 INJECTION INTRAMUSCULAR; INTRAVENOUS at 09:48

## 2022-12-21 RX ADMIN — MELOXICAM 7.5 MG: 7.5 TABLET ORAL at 06:37

## 2022-12-21 ASSESSMENT — PAIN SCALES - GENERAL
PAINLEVEL_OUTOF10: 8
PAINLEVEL_OUTOF10: 8
PAINLEVEL_OUTOF10: 0
PAINLEVEL_OUTOF10: 3
PAINLEVEL_OUTOF10: 7
PAINLEVEL_OUTOF10: 6

## 2022-12-21 ASSESSMENT — PAIN DESCRIPTION - LOCATION
LOCATION: KNEE

## 2022-12-21 ASSESSMENT — PAIN DESCRIPTION - ONSET
ONSET: ON-GOING
ONSET: ON-GOING
ONSET: AWAKENED FROM SLEEP
ONSET: ON-GOING

## 2022-12-21 ASSESSMENT — PAIN DESCRIPTION - DESCRIPTORS
DESCRIPTORS: ACHING
DESCRIPTORS: ACHING;DISCOMFORT;SORE
DESCRIPTORS: ACHING;DISCOMFORT
DESCRIPTORS: ACHING
DESCRIPTORS: ACHING
DESCRIPTORS: ACHING;SORE
DESCRIPTORS: SHARP

## 2022-12-21 ASSESSMENT — PAIN DESCRIPTION - FREQUENCY
FREQUENCY: CONTINUOUS

## 2022-12-21 ASSESSMENT — PAIN DESCRIPTION - ORIENTATION
ORIENTATION: LEFT

## 2022-12-21 ASSESSMENT — ENCOUNTER SYMPTOMS: SHORTNESS OF BREATH: 0

## 2022-12-21 ASSESSMENT — PAIN DESCRIPTION - PAIN TYPE
TYPE: SURGICAL PAIN

## 2022-12-21 ASSESSMENT — PAIN - FUNCTIONAL ASSESSMENT
PAIN_FUNCTIONAL_ASSESSMENT: PREVENTS OR INTERFERES SOME ACTIVE ACTIVITIES AND ADLS
PAIN_FUNCTIONAL_ASSESSMENT: ACTIVITIES ARE NOT PREVENTED
PAIN_FUNCTIONAL_ASSESSMENT: PREVENTS OR INTERFERES SOME ACTIVE ACTIVITIES AND ADLS

## 2022-12-21 ASSESSMENT — LIFESTYLE VARIABLES: SMOKING_STATUS: 0

## 2022-12-21 NOTE — PROGRESS NOTES
Patient admitted to PACU from OR. Patient opens eyes to name, drowsy. Resp easy unlabored on 2LNC with SO2 97%. Left knee ace wrap dressing dry and intact with ice pack on. Pulses palpable bilat. Patient moving all to command. Patient C/O surgical pain at k8 of 10 and medicated per MINH Medrano. VSS. IV patent to right hand.

## 2022-12-21 NOTE — DISCHARGE INSTRUCTIONS
If you use a bi-pap/cpap for sleep apnea, please wear when sleeping while taking narcotics. Do not drink alcohol while taking your blood thinner or narcotic pain medication. Do not take any sleep aids while on narcotics. Wear zahida hoses (compression stockings) for 2 weeks and to only remove when showering or at bedtime. Please wear Teds to follow up appointment with orthopedic surgeon. Use your Incentive Spirometer 4 times a day for 1 week after surgery to prevent pneumonia. Use ice packs as needed for swelling and pain. DO NOT apply directly to your skin. Use a clean towel or pillow case as a barrier between your skin and the ice pack. Pain pills and activity changes may lead to constipation. You may need to use a laxative such as Dulcolax, Senokot, Miralax, or Milk of Magnesia. If you do not have a bowel movement daily then we recommend to take a laxative that same evening. If you stop passing gas or are unable to have a bowel movement, please notify your surgeon for further instructions. Do not go any longer than 2 days without having a bowel movement without notification to your surgeon. When to clean your hands: For patients  In the hospital or in your home, you can come in contact with many harmful germs. To help prevent infection, wash your hands with soap and water often, especially:  Before and After touching or changing a dressing or bandage  After using the bathroom  Before and after eating  After coughing or sneezing  After using a tissue  After touching any object or surface that may be contaminated  After touching an animal during a pet therapy session (hospital)  After touching an animal, cleaning up after a pet, or preparing food for pets (home)    Please contact Felicia Traecy or the Orthopedic office with any questions or concerns after your discharge.  If you have any issues or concerns after hours please call the Orthopedic Office to reach the Surgeon on call first at  [phone]. If you need a pain medication refill please contact your surgeon's office. [facility]:    Monday-Friday 8am- 5pm      [address] [facility] Nurse Navigator: Monday-Wednesday 8am- 5pm, Thursday 8am-3pm  [phone]    After Hours Clinic Walk in [address]. [facility], [address]    Monday-Friday 5pm-9pm  &  Saturday 9am-1pm          [phone]      If you have a medical emergency please call 911 or seek immediate medical help. [address] [facility]  [phone]    Call 911 anytime you think you may need emergency care. For example, call if:  You passed out (lost consciousness). You have severe trouble breathing. You have sudden chest pain and shortness of breath, or you cough up blood. Call your doctor now or seek immediate medical care if:    Your leg or foot turns cold or changes color. You have symptoms of a blood clot in your leg (called a deep vein thrombosis), such as:  Pain in your calf, back of the knee, thigh, or groin. Redness and swelling in your leg or groin. You have symptoms of infection, such as: Increased pain, swelling, warmth, or redness. Red streaks leading from the wound. Pus draining from the wound. A fever. easy bruising, unusual bleeding (nose, mouth, vagina, or rectum), bleeding from wounds or needle injections, any bleeding that will not stop;  heavy menstrual periods;  urine that looks red, pink, or brown; or  black or bloody stools, coughing up blood or vomit that looks like coffee grounds.

## 2022-12-21 NOTE — PLAN OF CARE
Problem: Discharge Planning  Goal: Discharge to home or other facility with appropriate resources  Outcome: Adequate for Discharge  Flowsheets (Taken 12/21/2022 1601)  Discharge to home or other facility with appropriate resources:   Identify barriers to discharge with patient and caregiver   Arrange for needed discharge resources and transportation as appropriate   Identify discharge learning needs (meds, wound care, etc)   Refer to discharge planning if patient needs post-hospital services based on physician order or complex needs related to functional status, cognitive ability or social support system     Problem: ABCDS Injury Assessment  Goal: Absence of physical injury  Outcome: Adequate for Discharge  Flowsheets (Taken 12/21/2022 1601)  Absence of Physical Injury: Implement safety measures based on patient assessment     Problem: Pain  Goal: Verbalizes/displays adequate comfort level or baseline comfort level  Outcome: Adequate for Discharge  Flowsheets (Taken 12/21/2022 1601)  Verbalizes/displays adequate comfort level or baseline comfort level:   Encourage patient to monitor pain and request assistance   Assess pain using appropriate pain scale   Administer analgesics based on type and severity of pain and evaluate response   Implement non-pharmacological measures as appropriate and evaluate response   Consider cultural and social influences on pain and pain management   Notify Licensed Independent Practitioner if interventions unsuccessful or patient reports new pain     Problem: Chronic Conditions and Co-morbidities  Goal: Patient's chronic conditions and co-morbidity symptoms are monitored and maintained or improved  Outcome: Adequate for Discharge  Flowsheets (Taken 12/21/2022 1601)  Care Plan - Patient's Chronic Conditions and Co-Morbidity Symptoms are Monitored and Maintained or Improved:   Monitor and assess patient's chronic conditions and comorbid symptoms for stability, deterioration, or improvement   Collaborate with multidisciplinary team to address chronic and comorbid conditions and prevent exacerbation or deterioration   Update acute care plan with appropriate goals if chronic or comorbid symptoms are exacerbated and prevent overall improvement and discharge     Problem: Neurosensory - Adult  Goal: Achieves stable or improved neurological status  Outcome: Adequate for Discharge  Flowsheets (Taken 12/21/2022 1601)  Achieves stable or improved neurological status:   Assess for and report changes in neurological status   Maintain blood pressure and fluid volume within ordered parameters to optimize cerebral perfusion and minimize risk of hemorrhage   Monitor temperature, glucose, and sodium.  Initiate appropriate interventions as ordered     Problem: Cardiovascular - Adult  Goal: Maintains optimal cardiac output and hemodynamic stability  Outcome: Adequate for Discharge  Flowsheets (Taken 12/21/2022 1601)  Maintains optimal cardiac output and hemodynamic stability:   Monitor blood pressure and heart rate   Administer vasoactive medications as ordered   Administer fluid and/or volume expanders as ordered   Assess for signs of decreased cardiac output   Monitor urine output and notify Licensed Independent Practitioner for values outside of normal range     Problem: Skin/Tissue Integrity - Adult  Goal: Skin integrity remains intact  Outcome: Adequate for Discharge  Flowsheets (Taken 12/21/2022 1601)  Skin Integrity Remains Intact: Monitor for areas of redness and/or skin breakdown  Goal: Incisions, wounds, or drain sites healing without S/S of infection  Outcome: Adequate for Discharge     Problem: Musculoskeletal - Adult  Goal: Return mobility to safest level of function  Outcome: Adequate for Discharge  Flowsheets (Taken 12/21/2022 1601)  Return Mobility to Safest Level of Function:   Assess patient stability and activity tolerance for standing, transferring and ambulating with or without assistive

## 2022-12-21 NOTE — PROGRESS NOTES
Occupational Therapy  Facility/Department: [facility] OR  Occupational Therapy Initial Assessment    Name: Cleo Rushing  : 1960  MRN: [id number]  Date of Service: 2022    Discharge Recommendations:  2-3 sessions per week, Patient would benefit from continued therapy after discharge, Home with Home health OT, 24 hour supervision or assist        Margarito Looney Benjy scored a 18/24 on the AM-PAC ADL Inpatient form. Current research shows that an AM-PAC score of 18 or greater is typically associated with a discharge to the patient's home setting. Based on the patient's AM-PAC score, and their current ADL deficits, it is recommended that the patient have 2-3 sessions per week of Occupational Therapy at d/c to increase the patient's independence. At this time, this patient demonstrates the endurance and safety to discharge home with [facility] (home vs OP services) and a follow up treatment frequency of 2-3x/wk. Please see assessment section for further patient specific details. If patient discharges prior to next session this note will serve as a discharge summary. Please see below for the latest assessment towards goals. Patient Diagnosis(es): The encounter diagnosis was Primary osteoarthritis of left knee. Past Medical History:  has a past medical history of Arthritis, Hyperlipidemia, and Psoriasis. Past Surgical History:  has a past surgical history that includes Appendectomy; Anterior cruciate ligament repair (Left, ); bronchoscopy (2016); Upper gastrointestinal endoscopy (2016); and Colonoscopy (N/A, 2020). Treatment Diagnosis: impaired ADL. fxl mobility    Assessment   Performance deficits / Impairments: Decreased functional mobility ; Decreased endurance;Decreased ADL status; Decreased high-level IADLs;Decreased balance  Assessment: 61 yo male with elective L TKA  now WBAT. PTA, pt lives with spouse and fully IND.  Today, pt functioning just below baseline limited by L knee pain and decreased balance. Pt and spouse educated on seated bathing/dressing, LB dressing technique, zahida hose wear/dressing, importance of ice/elevation and expectation of fxl mobility. Pt completes bed mobility up to Min A, and toileting, sink side grooming and donning pants CGA. Pt does require Max A zahida hose (spouse to assist). Cont acute OT to address above deficits if admitted. Rec return home with spouse for 24hr SUP and OT 2-3x/week  Treatment Diagnosis: impaired ADL. fxl mobility  Prognosis: Good  Decision Making: Medium Complexity  REQUIRES OT FOLLOW-UP: Yes  Activity Tolerance  Activity Tolerance: Patient Tolerated treatment well        Plan   Occupational Therapy Plan  Times Per Week: 2-3 sessions if admitted  Current Treatment Recommendations: Strengthening, ROM, Balance training, Functional mobility training, Endurance training, Pain management, Safety education & training, Modalities, Positioning, Patient/Caregiver education & training, Self-Care / ADL, Home management training     Restrictions  Restrictions/Precautions  Restrictions/Precautions: Weight Bearing, Fall Risk  Lower Extremity Weight Bearing Restrictions  Right Lower Extremity Weight Bearing: Weight Bearing As Tolerated    Subjective   General  Chart Reviewed: Yes  Patient assessed for rehabilitation services?: Yes  Additional Pertinent Hx: 59 yo male with elective L TKA 12/21 now WBAT.   Family / Caregiver Present: Yes  Referring Practitioner: SHERRELL Dorman CNP  Diagnosis: L TKA  Subjective  Subjective: Pt resting in bed upon arrival and agreeable to OT/PT eval. Pt reporting mild L knee pain 2-3/10  General Comment  Comments: RN ok to see       Social/Functional History  Social/Functional History  Lives With: Spouse  Type of Home: House  Home Layout: Two level, Laundry in basement, 1/2 bath on main level, Bed/Bath upstairs (has a bed on main level)  Home Access: Stairs to enter with rails  Entrance Stairs - Number of Steps: 4 steps to enter  Entrance Stairs - Rails: Left  Bathroom Shower/Tub: Walk-in shower  Bathroom Toilet: Standard  Bathroom Equipment:  (getting a RTS with arms)  Bathroom Accessibility: Accessible  Home Equipment: Walker, rolling, Crutches  Has the patient had two or more falls in the past year or any fall with injury in the past year?: No  ADL Assistance: Independent  Homemaking Assistance: Independent  Ambulation Assistance: Independent  Transfer Assistance: Independent  Active : Yes  Additional Comments: Here for elective Left TKR: Reports right knee \"good\". Plans for sponge bathing       Objective         Observation/Palpation  Observation: L knee total joint incision with Prineo dressing, intact. BLE zahida hose  Safety Devices  Type of Devices: Call light within reach;Gait belt;Patient at risk for falls;Nurse notified; Bed alarm in place; Left in bed     Toilet Transfers  Toilet - Technique: Ambulating (RW)  Equipment Used: Grab bars  Toilet Transfer: Contact guard assistance  AROM: Within functional limits  PROM: Within functional limits  Strength: Within functional limits  Coordination: Within functional limits  Tone: Normal  ADL  Grooming: Contact guard assistance  Grooming Skilled Clinical Factors: sink side hand washing  LE Dressing: Contact guard assistance;Maximum assistance  LE Dressing Skilled Clinical Factors: Max A donning zahida hose. CGA donning pants, cues for threading LLE first.  Toileting: Contact guard assistance  Toileting Skilled Clinical Factors: pt urinates on commode seated. Bed mobility  Supine to Sit: Contact guard assistance  Sit to Supine: Minimal assistance (assist at LLE from wife)  Transfers  Sit to stand: Contact guard assistance  Stand to sit: Contact guard assistance  Transfer Comments: RW. cues hand placement. EOB/recliner/toilet.             Standing balance: CGA sink side grooming and PRN tx + pant management            Fxl mobility: CGA with RW, EOB in PACU>baez to bathroom>EOB with minimal unsteadiness, no LOB  Vision  Vision: Within Functional Limits  Hearing  Hearing: Within functional limits  Cognition  Overall Cognitive Status: WFL  Orientation  Overall Orientation Status: Within Functional Limits                  Education Given To: Patient  Education Provided: Role of Therapy;Plan of Care;Transfer Training;Precautions; Fall Prevention Strategies; ADL Adaptive Strategies  Education Provided Comments: Pt and spouse educated on seated bathing/dressing, LB dressing technique, zahida hose wear/dressing, importance of ice/elevation and expectation of fxl mobility  Education Method: Demonstration;Verbal  Barriers to Learning: None  Education Outcome: Verbalized understanding;Demonstrated understanding;Continued education needed                      AM-PAC Score        AM-[facility] Inpatient Daily Activity Raw Score: 18 (12/21/22 1534)  AM-PAC Inpatient ADL T-Scale Score : 38.66 (12/21/22 1534)  ADL Inpatient CMS 0-100% Score: 46.65 (12/21/22 1534)  ADL Inpatient CMS G-Code Modifier : CK (12/21/22 1534)    Goals  Short Term Goals  Time Frame for Short Term Goals: prior to d/c  Short Term Goal 1: toileting SUP  Short Term Goal 2: UB bathing/dressing SUP  Short Term Goal 3: tolerate 5 min fxl standing task SUP  Short Term Goal 4: fxl tx and mobility with RW household distances SUP  Short Term Goal 5: LB dressing SUP  Patient Goals   Patient goals : return home with spouse       Therapy Time   Individual Concurrent Group Co-treatment   Time In 1420         Time Out 1520         Minutes 60         Timed Code Treatment Minutes: 45 Minutes (15 eval. 25 ADL 20 TA)       KERVIN Medellin, OTR/L

## 2022-12-21 NOTE — PROGRESS NOTES
Patient awake and alert. Resp easy unlabored on room air O2 with SaO2 93%. Left knee pain at 6 of 10 and tolerable. VSS. IV patent. Moving all to command. Patient denies C/O nausea. VSS. IV patent.

## 2022-12-21 NOTE — PROGRESS NOTES
Data- discharge order received, patient verbalized agreement to discharge, disposition to previous residence, needs noted for Kayleigh Paulino and informed Karli Andrew NP. Action- discharge instructions prepared and given to patient and WIFE iraida, patient and iraida verbalized understanding. Medication information packet given r/t NEW and/or CHANGED prescriptions emphasizing name/purpose/side effects, pt verbalized understanding. Discharge instruction summary: Diet- general, Activity- wbat, Primary Care Physician as follows: Esteban Kilgore [place] [phone]. f/u appointment with orthopedic office noted below, immunizations reviewed and discussed with patient, prescription medications to be filled by retail pharmacy and then delivered. Inpatient surgical procedure precautions reviewed: . Neurovascular check performed and pulses present bilaterally +2 pedal, no redness, drainage or odor noted at surgical dressing left knee. Prineo glue Dressing clean, dry, and intact. Ice in place. Ruperto and scds on BLEs. moderate dorsi and plantar flexions noted bilaterally, toes appear appropriate to ethnicity in color, warm to touch, patient denies numbness or tingling. patient's bedside JILLIAN fernandes notified of patient completing discharge instructions. incentive spirometer provided to patient and educated on purpose and use, patient verbalized understanding. Made both aware mrsa nares +, to continue bactroban ointment as prescribed, both verbalized understanding. Response-  Medications delivered to patient via meds to bed program. Keflex was not delivered, notified Doc Cevallos NP and electronically sent to [phone], spoke to Newell and will be delivered prior to discharge. Notified dena ashley. Disposition is home with [facility] , to be transported by iraida.     Future Appointments   Date Time Provider Iván Singh   1/5/2023  1:00 PM MD Usman Lopez MMA   Electronically signed by Dillon Mora RN on 12/21/2022 at 4:03 PM

## 2022-12-21 NOTE — OP NOTE
Patient: Verona Lazo  YOB: 1960  MRN: [id number]    DATE OF PROCEDURE: 12/21/2022      PREOPERATIVE DIAGNOSIS:    Tricompartmental degenerative osteoarthritis of the left knee. History of ACL reconstruction with retained hardware     POSTOPERATIVE DIAGNOSIS:  Same     OPERATION PERFORMED:  Robotic-assisted left total knee arthroplasty with tibial hardware removal     SURGEON:  Freddy Hollis MD     ASSISTANT:  JOHANNE Barrios    EBL: 100 mL     IMPLANTS:  Konrad Triathlon CR cementless femur size 8  Konrad Triathlon cementless tibia size 8  9mm CS polyethylene  38mm cementless asymmetric patella     INDICATIONS:  The patient is a 60 y.o. male who presents for left knee replacement. The patient had been treated conservatively with anti-inflammatories, physical therapy, and intra-articular cortisone injections; these treatments were no longer providing significant relief. We discussed total knee arthroplasty. The operative procedure, alternatives, and risks were discussed in detail with the patient. The risks include but are not limited to: Infection, vessel injury, nerve injury, DVT, pulmonary embolism, implant loosening, need for revision surgery, loss of motion, continued pain. Informed consent for surgery was signed by the patient. OPERATIVE PROCEDURE:  The patient was seen in the preoperative holding area where the site of surgery was marked and informed consent was confirmed. The patient was brought back to the operating room by OR personnel. Anesthesia was administered. The patient was positioned supine on the operating table. The left lower extremity was then prepped and draped in a standard and sterile fashion. A final and formal timeout was then performed which confirmed the correct patient, correct position, and correct site of surgery. IV antibiotics were administered within 1 hour of the skin incision. An anterior midline incision was made over the knee.  Skin and subcutaneous tissue were divided down to the extensor mechanism. A medial parapatellar arthrotomy was performed. The knee was exposed by dissecting off of the tibial portion of the deep MCL and excising the fat pad. There were two staples in place in the proximal tibia. Bone around these implants was removed and the staples were backed out with minimal bone loss. Following this, two distal femoral pins and two proximal tibial pins were placed with robotic aerials. The knee was then registered with the robot. The implant was virtually manipulated to balance the flexion and extension gaps. Once this was done, the robotic cutting arm was brought in and the femoral and tibial cuts were performed. All bony fragments were removed. The medial and lateral menisci were removed. Posterior osteophytes were then removed. The knee was then reconstructed to accept a #8 tibial baseplate, a #8 femoral cruciate-retaining femur, and a trial polyethylene liner. The knee came to full extension, excellent flexion, and good overall stability. The patella tracked within the trochlea of the femur. All trial implants were then removed. The robotic areas and checkpoints were also removed. The ends of the bones were irrigated. The #8 tibial tray and the #8 [place] Triathlon cruciate-retaining femoral component were then placed. A trial reduction with the 9-mm poly was performed. Then the real 9-mm polyethylene liner was placed. The knee came to full extension, excellent flexion, and good overall stability. The patella was flipped, measured, and cut to accept a 38-mm asymmetric patella. The 38-mm asymmetric cementless patella was then placed. The patella tracked well. The wound was irrigated out. Hemostasis was obtained. The wound was injected with local anesthetic. It was also bathed with aqueous iodine. The wound then was closed in layers. The patient tolerated the procedure well.  A dressing was applied, and the patient was brought to the recovery room in good condition. JOHANNE Swann was essential in patient positioning, surgical assistance during the arthroplasty, and in wound closure.     Additional time was needed for this procedure for tibial staple hardware removal.    Edna León MD  12/21/2022

## 2022-12-21 NOTE — PROGRESS NOTES
Physical Therapy  Facility/Department: [facility] OR  Physical Therapy Initial Assessment    Name: Edgar Davis  : 1960  MRN: [id number]  Date of Service: 2022    Assessment / Discharge Recommendations:  -managed well with initial mobilization from bed to ambulate with rolling walker as needed for household distances  -instructed in initial HEP and general activity to do until Home PT takes charge of care starting tomorrow   -aarom ~10 to ~75-80 degrees  -instructed in car transfers (plans to ride home sitting across rear sit)   -has vertical lift at home if needed vs steps       Patient Diagnosis(es): The encounter diagnosis was Primary osteoarthritis of left knee. Past Medical History:  has a past medical history of Arthritis, Hyperlipidemia, and Psoriasis. Past Surgical History:  has a past surgical history that includes Appendectomy; Anterior cruciate ligament repair (Left, ); bronchoscopy (2016); Upper gastrointestinal endoscopy (2016); and Colonoscopy (N/A, 2020). Body Structures, Functions, Activity Limitations Requiring Skilled Therapeutic Intervention: Decreased functional mobility ; Decreased ADL status; Decreased ROM; Increased pain  Therapy Prognosis: Good  Decision Making: Medium Complexity  Activity Tolerance  Activity Tolerance: Patient tolerated treatment well     Plan   Physcial Therapy Plan  Additional Comments: plan to be determined by Home PT starting  tomorrow  Safety Devices  Type of Devices: Call light within reach, Gait belt, Patient at risk for falls, Nurse notified, Bed alarm in place, Left in bed     Restrictions  Restrictions/Precautions  Restrictions/Precautions: Weight Bearing, Fall Risk  Lower Extremity Weight Bearing Restrictions  Right Lower Extremity Weight Bearing: Weight Bearing As Tolerated     Subjective   General  Chart Reviewed: Yes  Patient assessed for rehabilitation services?: Yes  Additional Pertinent Hx: here for elective left TKR  Family / Caregiver Present: Yes (wife)  Follows Commands: Within Functional Limits  Subjective  Subjective: arrived to room along with OT to patient in Phase II PACU - he is alert oriented and agreeable to PTOT assessments and up to ambulate -states pain is minimal         Social/Functional History  Social/Functional History  Lives With: Spouse  Type of Home: House  Home Layout: Two level, Laundry in basement, 1/2 bath on main level, Bed/Bath upstairs (has a bed on main level)  Home Access: Stairs to enter with rails  Entrance Stairs - Number of Steps: 4 steps to enter  Entrance Stairs - Rails: Left  Bathroom Shower/Tub: Walk-in shower  Bathroom Toilet: Standard  Bathroom Equipment:  (getting a RTS with arms)  Bathroom Accessibility: Accessible  Home Equipment: Walker, rolling, Crutches  Has the patient had two or more falls in the past year or any fall with injury in the past year?: No  ADL Assistance: Independent  Homemaking Assistance: Independent  Ambulation Assistance: Independent  Transfer Assistance: Independent  Active : Yes  Additional Comments: Here for elective Left TKR: Reports right knee \"good\". Plans for sponge bathing  Vision/Hearing  Hearing  Hearing: Within functional limits    Cognition   Orientation  Overall Orientation Status: Within Functional Limits  Cognition  Overall Cognitive Status: WFL     Objective   Observation/Palpation  Observation: L knee total joint incision with Prineo dressing, intact.  VU jason  Gross Assessment  Tone: Normal  Sensation: Intact   Strength Other  Other: grossly wfl non-surgical limbs  Bed mobility  Supine to Sit: Contact guard assistance  Sit to Supine: Minimal assistance  Transfers  Sit to Stand: Stand by assistance;Contact guard assistance  Stand to Sit: Stand by assistance  Ambulation  Surface: Level tile  Device: Rolling Walker  Assistance: Stand by assistance;Contact guard assistance  Quality of Gait: step to progressing to partial step through pattern with good heel contact and good weightbearing  Distance: ~80 feet  Comments: stable on the walker  More Ambulation?: No  Stairs/Curb  Stairs?: No (declines need to practice as has been taking steps one at a time for several weeks)  Balance  Comments: steady with transfers and ambulation  -safely on and off the toilet - successful to urinate   Goals  Short Term Goals  Time Frame for Short Term Goals: today  Short Term Goal 1: bed mobility at [facility]  Short Term Goal 2: transfers at [facility]  Short Term Goal 3: ambulation at a rolling walker wbat for household distances  Short Term Goal 4: exercises at supervision  Patient Goals   Patient Goals : relief of chronic left knee pain and good function       Education  Patient Education  Education Given To: Patient; Family  Education Provided: Role of Therapy;Plan of Care;Home Exercise Program;Precautions;Transfer Training  Education Method: Demonstration;Verbal  Barriers to Learning: None  Education Outcome: Verbalized understanding;Demonstrated understanding      Therapy Time   Individual Concurrent Group Co-treatment   Time In 1420         Time Out [id number], PT

## 2022-12-21 NOTE — PROGRESS NOTES
Cindy performed this morning including Nurse navigator Gareth Wong, Physical therapist Wilder Sanon, and Occupational therapist Joann Billingsley. Discussed plan of care, discharge plan, and dme needs if applicable for orthopedic total joint patient. [facility] wang  [facility] of same day discharge.   Electronically signed by Heber Crump RN on 12/21/2022 at 9:41 AM

## 2022-12-21 NOTE — H&P
Update History & Physical    The patient's History and Physical of November 29, 2022 was reviewed with the patient and I examined the patient. There was no change. The surgical site was confirmed by the patient and me. Plan: The risks, benefits, expected outcome, and alternative to the recommended procedure have been discussed with the patient. Patient understands and wants to proceed with the procedure.      Electronically signed by Nery Heck MD on 12/21/2022 at 7:32 AM

## 2022-12-21 NOTE — ANESTHESIA PROCEDURE NOTES
Peripheral Block    Patient location during procedure: pre-op  Reason for block: post-op pain management  Start time: 12/21/2022 7:10 AM  End time: 12/21/2022 7:15 AM  Staffing  Anesthesiologist: Hudson Rust MD  Preanesthetic Checklist  Completed: patient identified, IV checked, site marked, risks and benefits discussed, surgical/procedural consents, equipment checked, pre-op evaluation, timeout performed, anesthesia consent given, oxygen available and monitors applied/VS acknowledged  Peripheral Block   Patient position: supine  Prep: ChloraPrep  Provider prep: mask and sterile gloves  Patient monitoring: cardiac monitor, continuous pulse ox, frequent blood pressure checks and IV access  Block type: Femoral  Adductor canal  Laterality: left  Injection technique: single-shot  Guidance: ultrasound guided    Needle   Needle type: short-bevel   Needle gauge: 21 G  Needle localization: ultrasound guidance  Needle length: 10 cm  Assessment   Injection assessment: negative aspiration for heme, no paresthesia on injection, local visualized surrounding nerve on ultrasound and no intravascular symptoms  Paresthesia pain: none  Slow fractionated injection: yes  Hemodynamics: stable  Real-time US image taken/store: yes  Outcomes: uncomplicated and patient tolerated procedure well    Additional Notes  Timeout performed with Nelson Cabrera CRNA who also assisted with block. Aspiration every 5 ml. Sartorius and Vastus Medialis Muscle, Femoral artery and Saphenous nerve are identified; the tip of the needle and the spread of the local anesthetic around the Saphenous nerve are visualized. The Saphenous nerve appeared to be anatomically normal and there were no abnormal pathologically findings seen.    Medications Administered  ropivacaine (NAROPIN) injection 0.5% - Perineural   20 mL - 12/21/2022 7:15:00 AM

## 2022-12-21 NOTE — PROGRESS NOTES
Letty Ortho NP stops to see patient. PT in with patient currently. Kel Ip to see patient after PT. Patient gets 2pm Ancef and tylenol. Tolerates turkey sandwich and bronson mist well.

## 2022-12-21 NOTE — ANESTHESIA PRE PROCEDURE
Department of Anesthesiology  Preprocedure Note       Name:  Fred Fry   Age:  60 y.o.  :  1960                                          MRN:  [id number]         Date:  2022      Surgeon: Nixon Marquis):  Velma Coulter MD    Procedure: Procedure(s):  LEFT TOTAL KNEE REPLACEMENT ROBOTIC ASSISTED    Medications prior to admission:   Prior to Admission medications    Medication Sig Start Date End Date Taking? Authorizing Provider   ibuprofen (ADVIL;MOTRIN) 200 MG tablet Take 400 mg by mouth in the morning and at bedtime   Yes Historical Provider, MD   mupirocin (BACTROBAN NASAL) 2 % nasal ointment Take by Nasal route 2 times daily.  22   Velma Coulter MD   loratadine (CLARITIN) 10 MG tablet TAKE ONE TABLET BY MOUTH DAILY FOR 30 DOSES 10/6/22   Sunny Mena DO   atorvastatin (LIPITOR) 10 MG tablet Take 1 tablet by mouth in the morning. 22   Sunny Mena DO   sildenafil (VIAGRA) 50 MG tablet Take 1 tablet by mouth as needed for Erectile Dysfunction 21   Sunny Mena DO   clobetasol propionate 0.05 % GEL gel Apply 1 application topically as needed 18   Historical Provider, MD   ketoconazole (NIZORAL) 2 % cream Apply 1 application topically as needed 18   Historical Provider, MD       Current medications:    Current Facility-Administered Medications   Medication Dose Route Frequency Provider Last Rate Last Admin    0.9 % sodium chloride infusion   IntraVENous Continuous Sohail Thomas MD        sodium chloride flush 0.9 % injection 5-40 mL  5-40 mL IntraVENous 2 times per day Sohail Thomas MD        sodium chloride flush 0.9 % injection 5-40 mL  5-40 mL IntraVENous PRN Sohail Thomas MD        0.9 % sodium chloride infusion   IntraVENous PRN Sohail Thomas MD        ceFAZolin (ANCEF) 2,000 mg in dextrose 5 % 50 mL IVPB (mini-bag)  2,000 mg IntraVENous Once Velma Coulter MD        vancomycin (VANCOCIN) 1,500 mg in dextrose 5 % 250 mL IVPB (ADDAVIAL)  1,500 mg IntraVENous On Call to Tc Buck  mL/hr at 12/21/22 0641 1,500 mg at 12/21/22 9016       Allergies:  No Known Allergies    Problem List:    Patient Active Problem List   Diagnosis Code    Psoriasis L40.9    Abnormal CT scan, chest R93.89       Past Medical History:        Diagnosis Date    Arthritis     LEFT KNEE    Hyperlipidemia     BORDERLINE    Psoriasis        Past Surgical History:        Procedure Laterality Date    ANTERIOR CRUCIATE LIGAMENT REPAIR Left 1990    APPENDECTOMY      BRONCHOSCOPY  12/12/2016    COLONOSCOPY N/A 9/29/2020    COLONOSCOPY WITH BIOPSY performed by Osmany Mendoza MD at [place] [address]  12/29/2016    Dr Lopez Mandel, dilation        Social History:    Social History     Tobacco Use    Smoking status: Former     Types: Cigarettes    Smokeless tobacco: Never    Tobacco comments:     smoked 1/20 of a pack per day-[employer]=FORMER   Substance Use Topics    Alcohol use:  Yes     Alcohol/week: 1.0 standard drink     Types: 1 Cans of beer per week     Comment: SOCIALLY                                Counseling given: Not Answered  Tobacco comments: smoked 1/20 of a pack per day-[employer]=FORMER      Vital Signs (Current):   Vitals:    12/14/22 1313 12/21/22 0614 12/21/22 0618 12/21/22 0637   BP:   133/88    Pulse:   78    Resp:   18 18   Temp:   97.5 °F (36.4 °C)    TempSrc:   Temporal    SpO2:   98%    Weight: 240 lb (108.9 kg) 240 lb (108.9 kg)     Height: 6' 2\" (1.88 m) 6' 2\" (1.88 m)                                                BP Readings from Last 3 Encounters:   12/21/22 133/88   11/29/22 118/78   12/01/21 122/62       NPO Status: Time of last liquid consumption: 2200                        Time of last solid consumption: 2000                        Date of last liquid consumption: 12/20/22                        Date of last solid food consumption: 12/20/22    BMI:   Wt Readings from Last 3 Encounters:   12/21/22 240 lb (108.9 kg)   12/16/22 240 lb (108.9 kg)   11/29/22 249 lb (112.9 kg)     Body mass index is 30.81 kg/m². CBC:   Lab Results   Component Value Date/Time    WBC 4.5 12/12/2022 10:50 AM    RBC 4.91 12/12/2022 10:50 AM    HGB 14.4 12/12/2022 10:50 AM    HCT 43.2 12/12/2022 10:50 AM    MCV 87.9 12/12/2022 10:50 AM    RDW 14.2 12/12/2022 10:50 AM     12/12/2022 10:50 AM       CMP:   Lab Results   Component Value Date/Time     12/12/2022 10:50 AM    K 4.7 12/12/2022 10:50 AM     12/12/2022 10:50 AM    CO2 27 12/12/2022 10:50 AM    BUN 30 12/12/2022 10:50 AM    CREATININE 1.0 12/12/2022 10:50 AM    GFRAA >60 12/01/2021 11:05 AM    GFRAA >60 12/04/2011 10:30 AM    AGRATIO 2.2 12/01/2021 11:05 AM    LABGLOM >60 12/12/2022 10:50 AM    GLUCOSE 88 12/12/2022 10:50 AM    PROT 6.8 12/01/2021 11:05 AM    PROT 6.6 12/04/2011 10:30 AM    CALCIUM 9.5 12/12/2022 10:50 AM    BILITOT 0.4 12/01/2021 11:05 AM    ALKPHOS 65 12/01/2021 11:05 AM    AST 19 12/01/2021 11:05 AM    ALT 25 12/01/2021 11:05 AM       POC Tests: No results for input(s): POCGLU, POCNA, POCK, POCCL, POCBUN, POCHEMO, POCHCT in the last 72 hours.     Coags:   Lab Results   Component Value Date/Time    PROTIME 13.0 12/12/2022 10:50 AM    INR 0.99 12/12/2022 10:50 AM    APTT 30.2 12/12/2022 10:50 AM       HCG (If Applicable): No results found for: PREGTESTUR, PREGSERUM, HCG, HCGQUANT     ABGs: No results found for: PHART, PO2ART, UYG0DMF, NQC7ZFD, BEART, L5YDATAT     Type & Screen (If Applicable):  No results found for: LABABO, LABRH    Drug/Infectious Status (If Applicable):  No results found for: HIV, HEPCAB    COVID-19 Screening (If Applicable):   Lab Results   Component Value Date/Time    COVID19 Not Detected 04/15/2021 09:31 AM    COVID19 DETECTED 01/12/2021 01:04 PM           Anesthesia Evaluation  Patient summary reviewed no history of anesthetic complications:   Airway: Mallampati: II  TM distance: >3 FB   Neck ROM: full  Mouth opening: > = 3 FB   Dental: normal exam         Pulmonary:Negative Pulmonary ROS       (-) shortness of breath and not a current smoker          Patient did not smoke on day of surgery. Cardiovascular:    (+) hyperlipidemia    (-) pacemaker, past MI, CABG/stent and  angina       Beta Blocker:  Not on Beta Blocker         Neuro/Psych:   Negative Neuro/Psych ROS     (-) seizures and CVA           GI/Hepatic/Renal: Neg GI/Hepatic/Renal ROS       (-) liver disease and no renal disease       Endo/Other: Negative Endo/Other ROS       (-) diabetes mellitus, hypothyroidism, hyperthyroidism               Abdominal:             Vascular: negative vascular ROS. Other Findings:           Anesthesia Plan      general and regional     ASA 2     (Left adductor canal block, refused spinal)  Induction: intravenous. MIPS: Postoperative opioids intended and Prophylactic antiemetics administered. Anesthetic plan and risks discussed with patient. Plan discussed with CRNA. This pre-anesthesia assessment may be used as a history and physical.    DOS STAFF ADDENDUM:    Pt seen and examined, chart reviewed (including anesthesia, drug and allergy history). No interval changes to history and physical examination. Anesthetic plan, risks, benefits, alternatives, and personnel involved discussed with patient. Patient verbalized an understanding and agrees to proceed.       Brandon Calabrese MD  December 21, 2022  7:27 AM

## 2022-12-21 NOTE — ANESTHESIA POSTPROCEDURE EVALUATION
Department of Anesthesiology  Postprocedure Note    Patient: Trudy Moreno  MRN: [id number]  YOB: 1960  Date of evaluation: 12/21/2022      Procedure Summary     Date: 12/21/22 Room / Location: [address]    Anesthesia Start: 7948 Anesthesia Stop: 1342    Procedure: LEFT TOTAL KNEE REPLACEMENT ROBOTIC ASSISTED (Left: Knee) Diagnosis:       Arthritis of left knee      (LEFT ARTHRITIS KNEE)    Surgeons: Christina Shepard MD Responsible Provider: Radha Becerra MD    Anesthesia Type: general, regional ASA Status: 2          Anesthesia Type: No value filed.     Chiquita Phase I: Chiquita Score: 10    Chiquita Phase II:        Anesthesia Post Evaluation    Patient location during evaluation: PACU  Patient participation: complete - patient participated  Level of consciousness: awake and alert  Pain score: 6  Nausea & Vomiting: no nausea  Complications: no  Cardiovascular status: hemodynamically stable  Respiratory status: acceptable  Hydration status: stable

## 2022-12-27 ENCOUNTER — TELEPHONE (OUTPATIENT)
Dept: ORTHOPEDIC SURGERY | Age: 62
End: 2022-12-27

## 2022-12-27 DIAGNOSIS — M17.12 PRIMARY OSTEOARTHRITIS OF LEFT KNEE: ICD-10-CM

## 2022-12-27 RX ORDER — OXYCODONE HYDROCHLORIDE 5 MG/1
5-10 TABLET ORAL EVERY 6 HOURS PRN
Qty: 40 TABLET | Refills: 0 | Status: SHIPPED | OUTPATIENT
Start: 2022-12-27 | End: 2023-01-01

## 2022-12-27 RX ORDER — METHYLPREDNISOLONE 4 MG/1
TABLET ORAL
Qty: 1 KIT | Refills: 0 | Status: SHIPPED | OUTPATIENT
Start: 2022-12-27

## 2022-12-27 NOTE — TELEPHONE ENCOUNTER
Spoke with patient regarding post discharge from hospital.    Incision status: No drainage, odor, or redness noted per patient    Edema/Swelling/Teds: edema noted - asking if he can take naproxen - educated patient he cannot take any anti-inflammatories until he speaks with Dr Kurtis Dean, wearing teds some of the days, educated on wearing teds as instructed, patient verbalized understanding, elevating legs when resting. Pain level and status: \"a little better\"     Use of pain medications: yes ; Patient stated they are taking their pain medication oxycodone as prescribed. Use of ice therapy: yes     Blood thinner: aspirin ; Verified with patient that they are taking their anticoagulant as prescribed twice a day. Bowels: no constipation noted    Home Care Agency active: yes ; Claims already worked with home therapist .  Outpatient therapy: n/a    Do you have all of your medications:  refill requested on oxycodone    Changes in medications: no    No other questions/concerns at this time. Encouraged patient to call Orthopedic Nurse Navigator Fauzia Tsang or Orthopedic office if has any questions/concerns.       Follow up appointments:    Future Appointments   Date Time Provider Iván Singh   1/5/2023  1:00 PM MD Maria Luisa Reyes     Electronically signed by Fifi Dick RN on 12/27/2022 at 2:08 PM

## 2022-12-28 ENCOUNTER — TELEPHONE (OUTPATIENT)
Dept: ORTHOPEDICS UNIT | Age: 62
End: 2022-12-28

## 2022-12-28 NOTE — TELEPHONE ENCOUNTER
There should be pretty straightforward directions on the packaging. Standard medrol dosepack.  Take 6 first day, 5 the next, etc.

## 2022-12-28 NOTE — TELEPHONE ENCOUNTER
Patient's wife called asking for clarification on how to to medrol dosepack. States the directions on the packaging do not specify how often or frequently to take. Please advise.

## 2023-01-03 ENCOUNTER — TELEPHONE (OUTPATIENT)
Dept: ORTHOPEDIC SURGERY | Age: 63
End: 2023-01-03

## 2023-01-03 DIAGNOSIS — M17.32 POST-TRAUMATIC OSTEOARTHRITIS OF LEFT KNEE: Primary | ICD-10-CM

## 2023-01-03 RX ORDER — OXYCODONE HYDROCHLORIDE 5 MG/1
5-10 TABLET ORAL EVERY 6 HOURS PRN
Qty: 40 TABLET | Refills: 0 | Status: SHIPPED | OUTPATIENT
Start: 2023-01-03 | End: 2023-01-10

## 2023-01-03 NOTE — TELEPHONE ENCOUNTER
Patient called requesting refill of oxycodone. Preferred pharmacy is [facility]. Left TKA 12/21/2022.

## 2023-01-09 ENCOUNTER — HOSPITAL ENCOUNTER (OUTPATIENT)
Dept: PHYSICAL THERAPY | Age: 63
Setting detail: THERAPIES SERIES
Discharge: HOME | End: 2023-01-09
Payer: COMMERCIAL

## 2023-01-09 ENCOUNTER — OFFICE VISIT (OUTPATIENT)
Dept: ORTHOPEDIC SURGERY | Age: 63
End: 2023-01-09

## 2023-01-09 VITALS — BODY MASS INDEX: 30.8 KG/M2 | HEIGHT: 74 IN | WEIGHT: 240 LBS

## 2023-01-09 DIAGNOSIS — Z96.652 STATUS POST TOTAL LEFT KNEE REPLACEMENT: Primary | ICD-10-CM

## 2023-01-09 PROCEDURE — 97016 VASOPNEUMATIC DEVICE THERAPY: CPT

## 2023-01-09 PROCEDURE — 99024 POSTOP FOLLOW-UP VISIT: CPT | Performed by: ORTHOPAEDIC SURGERY

## 2023-01-09 PROCEDURE — 97164 PT RE-EVAL EST PLAN CARE: CPT

## 2023-01-09 PROCEDURE — 97110 THERAPEUTIC EXERCISES: CPT

## 2023-01-09 PROCEDURE — 97140 MANUAL THERAPY 1/> REGIONS: CPT

## 2023-01-09 NOTE — PROGRESS NOTES
[facility]    Chief Complaint: Follow-up s/p left total knee arthroplasty    HPI:  Bo Chris is a 60 y.o. who is here in follow-up of left total knee arthroplasty performed on December 21, 2022. This is his first postoperative visit. He started outpatient physical therapy today. He walks with use of walker and is taking oxycodone 10 mg 3 times a day. He rates pain as 4-7/10 at times. He has completed his aspirin for DVT prophylaxis. Patient Active Problem List   Diagnosis    Psoriasis    Abnormal CT scan, chest       ROS:  Constitutional: denies fever, chills, weight loss  MSK: denies pain in other joints, muscle aches  Neurological: denies numbness, tingling, weakness    Exam:  Height 6' 2\" (1.88 m), weight 240 lb (108.9 kg). Appearance: sitting in exam room chair, appears to be in no acute distress, awake and alert  Resp: unlabored breathing on room air  Skin: warm, dry and intact with out erythema or significant increased temperature  Neuro: grossly intact both lower extremities. Intact sensation to light touch. Motor exam 4+ to 5/5 in all major motor groups. Left knee: Incision is healing as expected. Range of motion is 8 to 90 degrees. Sensation is intact light touch. There is brisk capillary refill. There is 5/5 muscle strength in all muscle groups. Imaging:  3 views of the left knee were performed and reviewed today. Significant for total knee arthroplasty prosthesis in place with no signs of osteolysis, loosening, fracture, or dislocation. Assessment:  S/p left total knee arthroplasty    Plan:  He is recovering well postoperatively. He will continue physical therapy to work on range of motion and strength. He will continue taking oxycodone and wean off this as tolerated. Follow-up in 4 weeks for repeat assessment and a check of his range of motion.     This dictation was done with [employer] dictation and may contain mechanical errors related to translation.

## 2023-01-09 NOTE — PLAN OF CARE
[facility] - Outpatient Rehabilitation and Therapy, [facility]  [address] [place] [facility], [facility]  Phone: [phone]   Fax:     [phone]      Physical Therapy Treatment Note/ Progress Report:     Date:  2023    Patient Name:  Olayinka Gruber    :  1960  MRN: [id number]    Pertinent Medical History:     Medical/Treatment Diagnosis Information:  Medical Diagnosis: Post-traumatic osteoarthritis of left knee [M17.32]  Treatment Diagnosis: s/p TKA, limited mobility, function, pain. Insurance/Certification information:     Physician Information:  Isidro Ordonez MD  Plan of care signed (Y/N):     Date of Patient follow up with Physician:      Progress Report: []  Yes  [x]  No     Date Range for reporting period:  Beginnin2023  Ending:      Progress report due (10 Rx/or 30 days whichever is less): #69     Recertification due (POC duration/ or 90 days whichever is less):      Visit # POC/Insurance Allowable Auth Needed   1 /  AIM []Yes   []No     Latex Allergy:  [x]NO      []YES  Preferred Language for Healthcare:   [x]English       []Other:    Functional Scale:       Date assessed: at eval  Test:womac  Score:52% disability    Pain level: 2-8 /10     History of Injury:  Pt here for re-eval s/p L TKA DOS 22. He is amb w/ FWW with mod limp and mod TKE deficit. W/o walker, he has severe limp and limited Wbing on affected side. He reports long hx of knee problems stemming from the  when he had ACL and meniscus surgery. He was walking with crutches just prior to surgery. He works as an  and hopes to return to full function. He did have a few weeks of home PT and that went well. He has removed his post-op dressing, and he has self applied 2 strips of tape horizontally across his incision as he was in fear of his incision busting open. Knee is moderately swollen, but incision appears to be healing well. SUBJECTIVE:  See eval  *future may need compression garment (above knee) to aid in swelling reduction. OBJECTIVE:  Observation:   Test measurements:    ROM Prehab eval 1/9       knee R= 5* to 130*  L=10* to 130* L= 15* to 95* cold  Best: 8* to 99*                                            Strength         Knee ext  At least 4-/5       Knee flex  At least 4-/5                                  TESTS/ OTHER         Circumference (superior patella)  R=44.5cm  L=48cm                                                   RESTRICTIONS/PRECAUTIONS:     Exercises/Interventions:     Therapeutic Ex (71857)   Min: Reps/Resistance Notes/CUES   cardio > Try rec bike once ROM >105*        LAQ x8    HS curl >         ST hip abd Pt edu    EOB hip ext Pt edu         Heel/ toe raises X10 pt edu                        Therapeutic Activity (25986) Min:      Pt edu Pathology, role of PT, early rehab goals for ROM/ function/ gait                             NMR re-education (10685)  Min:  CUES NEEDED   QS Bilat supine 10sec x 5    squat Mini squat w/ TKE emphasis: 10sec x 6              Manual Intervention (36367) Min:      Knee manual PROM, PA mobs, patellar mobs, scar mobs, HS str, hip PROM                        Modalities  Min: Vaso for edema and pain    vaso Low comp x 15' Leg elevated on bolster               Other Therapeutic Activities: Pt was educated on PT POC, Diagnosis, Prognosis, pathomechanics as well as frequency and duration of scheduling future physical therapy appointments. Time was also taken on this day to answer all patient questions and participation in PT. Reviewed appointment policy in detail with patient and patient verbalized understanding. Home Exercise Program: Patient was instructed in the following for HEP:       1/9: Access Code: [email or web address]  URL: [email or web address]  Date: 01/09/2023  Prepared by: Nadine Vo    Exercises  Supine Heel Slide with Strap - 2-3 x daily - 4-6 x weekly - 2-3 sets - 10-15 reps  Supine Quadricep Sets - 1-3 x daily - 4-6 x weekly - 2-3 sets - 10 reps  Seated Long Arc Quad - 1-3 x daily - 4-6 x weekly - 2-3 sets - 10 reps  Mini Squat with Counter Support - 2-3 x daily - 4-6 x weekly - 2-3 sets - 10-15 reps  Heel Toe Raises with Counter Support - 1-3 x daily - 4-6 x weekly - 2-3 sets - 10 reps  Standing Hip Abduction with Counter Support - 2-3 x daily - 4-6 x weekly - 2-3 sets - 10-15 reps  Prone Hip Extension on Table - 2-3 x daily - 4-6 x weekly - 2-3 sets - 10-15 reps        . Patient verbalized/demonstrated understanding and was issued written handout. Therapeutic Exercise and NMR EXR  [x] (33228) Provided verbal/tactile cueing for activities related to strengthening, flexibility, endurance, ROM for improvements in LE, proximal hip, and core control with self care, mobility, lifting, ambulation. [x] (20873) Provided verbal/tactile cueing for activities related to improving balance, coordination, kinesthetic sense, posture, motor skill, proprioception  to assist with LE, proximal hip, and core control in self care, mobility, lifting, ambulation and eccentric single leg control. [facility] and Therapeutic Activities:    [x] (08768 or 13534) Provided verbal/tactile cueing for activities related to improving balance, coordination, kinesthetic sense, posture, motor skill, proprioception and motor activation to allow for proper function of core, proximal hip and LE with self care and ADLs and functional mobility.    [x] (78112) Gait Re-education- Provided training and instruction to the patient for proper LE, core and proximal hip recruitment and positioning and eccentric body weight control with ambulation re-education including up and down stairs     Home Exercise Program:    [x] (35554) Reviewed/Progressed HEP activities related to strengthening, flexibility, endurance, ROM of core, proximal hip and LE for functional self-care, mobility, lifting and ambulation/stair navigation   [x] (40092)Reviewed/Progressed HEP activities related to improving balance, coordination, kinesthetic sense, posture, motor skill, proprioception of core, proximal hip and LE for self care, mobility, lifting, and ambulation/stair navigation      Manual Treatments:  PROM / STM / Oscillations-Mobs:  G-I, II, III, IV (PA's, Inf., Post.)  [x] (06809) Provided manual therapy to mobilize LE, proximal hip and/or LS spine soft tissue/joints for the purpose of modulating pain, promoting relaxation,  increasing ROM, reducing/eliminating soft tissue swelling/inflammation/restriction, improving soft tissue extensibility and allowing for proper ROM for normal function with self care, mobility, lifting and ambulation. Approval Dates:  CPT Code Units Approved Units Used  Date Updated:                     Charges:  Timed Code Treatment Minutes: 30   Total Treatment Minutes: 50      [] EVAL (LOW) 24728 (typically 20 minutes face-to-face)  [] EVAL (MOD) 31508 (typically 30 minutes face-to-face)  [] EVAL (HIGH) 12819 (typically 45 minutes face-to-face)  [x] RE-EVAL     [x] DT(84868) x     [] Dry needle 1 or 2 Muscles (25219)  [] NMR (28607) x     [] Dry needle 3+ Muscles (03298)  [x] Manual (53628) x     [] Ultrasound (88688) x  [] TA (28213) x     [] Mech Traction (41950)  [] ES(attended) (75655)     [] ES (un) (11133):   [x] Vasopump (76953) [] Ionto (92170)   [] Other:    Ifeoma Pozo stated goal: able to walk community distanced w/o AD. [] Progressing: [] Met: [] Not Met: [] Adjusted    Therapist goals for Patient:   Short Term Goals: To be achieved in: 2 weeks  1. Independent in HEP and progression per patient tolerance, in order to prevent re-injury. [] Progressing: [] Met: [] Not Met: [] Adjusted  2. Patient will have a decrease in pain to facilitate improvement in movement, function, and ADLs as indicated by Functional Deficits.   [] Progressing: [] Met: [] Not Met: [] Adjusted    Long Term Goals: To be achieved in: at discharge  1. Decrease womac functional outcome score from 52% to 40% to assist with reaching prior level of function. [] Progressing: [] Met: [] Not Met: [] Adjusted  2. Patient will demonstrate increased AROM to 0-120* to allow for proper joint functioning as indicated by patients Functional Deficits. [] Progressing: [] Met: [] Not Met: [] Adjusted  3. Patient will demonstrate an increase in Strength to good proximal hip strength and control, within 5lb HHD in LE to allow for proper functional mobility as indicated by patients Functional Deficits. [] Progressing: [] Met: [] Not Met: [] Adjusted  4. Patient will return to all functional activities without increased symptoms or restriction. [] Progressing: [] Met: [] Not Met: [] Adjusted  5. Able to ambulate stairs reciprocally w/ 1 rail or less. [] Progressing: [] Met: [] Not Met: [] Adjusted       ASSESSMENT:  Pt has s/s consistent w/ recent TKA surgery. He has significant Rom and strength and functional deficits and needs skilled PT to restore his mobility, strength, and stability back to at least his baseline. Treatment/Activity Tolerance:  [x] Patient tolerated treatment well [] Patient limited by fatique  [] Patient limited by pain  [] Patient limited by other medical complications  [] Other:     Overall Progression Towards Functional goals/ Treatment Progress Update:  [] Patient is progressing as expected towards functional goals listed. [] Progression is slowed due to complexities/Impairments listed. [] Progression has been slowed due to co-morbidities.   [x] Plan just implemented, too soon to assess goals progression <30days   [] Goals require adjustment due to lack of progress  [] Patient is not progressing as expected and requires additional follow up with physician  [] Other    Prognosis for POC: [x] Good [] Fair  [] Poor    Patient requires continued skilled intervention: [x] Yes  [] No PLAN:   1/9: PT recommends he cont up to 2x/wk for up to 8-12 weeks depending on medical necessity and restoration of function and goals met. [] Continue per plan of care [] Alter current plan (see comments)  [x] Plan of care initiated [] Hold pending MD visit [] Discharge    Electronically signed by: Kyree Recio PT , DPT  #[id number]      Note: If patient does not return for scheduled/recommended follow up visits, this note will serve as a discharge from care along with the most recent update on progress.

## 2023-01-11 ENCOUNTER — TELEPHONE (OUTPATIENT)
Dept: ORTHOPEDIC SURGERY | Age: 63
End: 2023-01-11

## 2023-01-11 DIAGNOSIS — M17.32 POST-TRAUMATIC OSTEOARTHRITIS OF LEFT KNEE: ICD-10-CM

## 2023-01-11 RX ORDER — OXYCODONE HYDROCHLORIDE 5 MG/1
5-10 TABLET ORAL EVERY 6 HOURS PRN
Qty: 40 TABLET | Refills: 0 | Status: SHIPPED | OUTPATIENT
Start: 2023-01-11 | End: 2023-01-18

## 2023-01-11 NOTE — TELEPHONE ENCOUNTER
Prescription Refill     Medication Name:  OxyCodone 5mg    Pharmacy: [facility] [id number] [id number], [address] [place] [address] [phone] Rachna Rosario [phone]     Patient Contact Number:  [phone]     Please contact patient at above tele#, this was supposed to be called in on 01.09.23

## 2023-01-12 ENCOUNTER — PATIENT MESSAGE (OUTPATIENT)
Dept: ORTHOPEDIC SURGERY | Age: 63
End: 2023-01-12

## 2023-01-12 ENCOUNTER — HOSPITAL ENCOUNTER (OUTPATIENT)
Dept: PHYSICAL THERAPY | Age: 63
Setting detail: THERAPIES SERIES
Discharge: HOME | End: 2023-01-12
Payer: COMMERCIAL

## 2023-01-12 PROCEDURE — 97140 MANUAL THERAPY 1/> REGIONS: CPT

## 2023-01-12 PROCEDURE — 97110 THERAPEUTIC EXERCISES: CPT

## 2023-01-12 PROCEDURE — 97112 NEUROMUSCULAR REEDUCATION: CPT

## 2023-01-12 NOTE — FLOWSHEET NOTE
[facility] - Outpatient Rehabilitation and Therapy, [facility]  [address] [place] [facility], [facility]  Phone: [phone]   Fax:     [phone]      Physical Therapy Treatment Note/ Progress Report:     Date:  2023    Patient Name:  Ramiro Villeda    :  1960  MRN: [id number]    Pertinent Medical History:     Medical/Treatment Diagnosis Information:  Medical Diagnosis: Post-traumatic osteoarthritis of left knee [M17.32]  Treatment Diagnosis: s/p TKA, limited mobility, function, pain. Insurance/Certification information:     Physician Information:  Delio Chavez MD  Plan of care signed (Y/N):     Date of Patient follow up with Physician:      Progress Report: []  Yes  [x]  No     Date Range for reporting period:  Beginnin2023  Ending:      Progress report due (10 Rx/or 30 days whichever is less): #08     Recertification due (POC duration/ or 90 days whichever is less):      Visit # POC/Insurance Allowable Auth Needed   2 / 10 AIM 10 visits till  []Yes   []No     Latex Allergy:  [x]NO      []YES  Preferred Language for Healthcare:   [x]English       []Other:    Functional Scale:       Date assessed: at eval  Test:womac  Score:52% disability    Pain level:  /10     History of Injury:  Pt here for re-eval s/p L TKA DOS 22. He is amb w/ FWW with mod limp and mod TKE deficit. W/o walker, he has severe limp and limited Wbing on affected side. He reports long hx of knee problems stemming from the  when he had ACL and meniscus surgery. He was walking with crutches just prior to surgery. He works as an  and hopes to return to full function. He did have a few weeks of home PT and that went well. He has removed his post-op dressing, and he has self applied 2 strips of tape horizontally across his incision as he was in fear of his incision busting open.  Knee is moderately swollen, but incision appears to be healing well. SUBJECTIVE:    1/12: pt reports doing ok. Trying to do HEP 3x/day and knee feels stiff. He is walking with less limp however. OBJECTIVE:  Observation:   Test measurements:    ROM Prehab eval 1/9 1/12      knee R= 5* to 130*  L=10* to 130* L= 15* to 95* cold  Best: 8* to 99*  AAROM:  2 to 98*    PROM: 0 to 101*                                          Strength         Knee ext  At least 4-/5       Knee flex  At least 4-/5                                  TESTS/ OTHER         Circumference (superior patella)  R=44.5cm  L=48cm                                                 RESTRICTIONS/PRECAUTIONS:     Exercises/Interventions:     Therapeutic Ex (18958)   Min: Reps/Resistance Notes/CUES   cardio > Try rec bike once ROM >105*        LAQ 2x10    HS curl Orange x10         ST hip abd 2x10 ea    EOB hip ext 2x10 ea         Heel/ toe raises X10 pt edu                        Therapeutic Activity (46250) Min:      Pt edu Role of edema mgmt, ROM goals                             NMR re-education (80794)  Min:  CUES NEEDED   QS Bilat supine 10sec x 6    squat Mini squat w/ TKE emphasis: 10x, 8sec TKE              Manual Intervention (75570) Min:      Knee manual PROM, PA mobs, patellar mobs, scar mobs, HS str, hip PROM                        Modalities  Min: VASO not covered by insurance    Leg elevated on bolster   Cold pack Top/ bottom w/ leg elevated x 10'           Other Therapeutic Activities: Pt was educated on PT POC, Diagnosis, Prognosis, pathomechanics as well as frequency and duration of scheduling future physical therapy appointments. Time was also taken on this day to answer all patient questions and participation in PT. Reviewed appointment policy in detail with patient and patient verbalized understanding. Home Exercise Program: Patient was instructed in the following for HEP:       1/9: Access Code: [email or web address]  URL: [email or web address]  Date: 01/09/2023  Prepared by: Cecille Weaver    Exercises  Supine Heel Slide with Strap - 2-3 x daily - 4-6 x weekly - 2-3 sets - 10-15 reps  Supine Quadricep Sets - 1-3 x daily - 4-6 x weekly - 2-3 sets - 10 reps  Seated Long Arc Quad - 1-3 x daily - 4-6 x weekly - 2-3 sets - 10 reps  Mini Squat with Counter Support - 2-3 x daily - 4-6 x weekly - 2-3 sets - 10-15 reps  Heel Toe Raises with Counter Support - 1-3 x daily - 4-6 x weekly - 2-3 sets - 10 reps  Standing Hip Abduction with Counter Support - 2-3 x daily - 4-6 x weekly - 2-3 sets - 10-15 reps  Prone Hip Extension on Table - 2-3 x daily - 4-6 x weekly - 2-3 sets - 10-15 reps        . Patient verbalized/demonstrated understanding and was issued written handout. Therapeutic Exercise and NMR EXR  [x] (75707) Provided verbal/tactile cueing for activities related to strengthening, flexibility, endurance, ROM for improvements in LE, proximal hip, and core control with self care, mobility, lifting, ambulation. [x] (88892) Provided verbal/tactile cueing for activities related to improving balance, coordination, kinesthetic sense, posture, motor skill, proprioception  to assist with LE, proximal hip, and core control in self care, mobility, lifting, ambulation and eccentric single leg control. [facility] The[facility] Activities:    [x] (01823 or 57689) Provided verbal/tactile cueing for activities related to improving balance, coordination, kinesthetic sense, posture, motor skill, proprioception and motor activation to allow for proper function of core, proximal hip and LE with self care and ADLs and functional mobility.    [x] (73231) Gait Re-education- Provided training and instruction to the patient for proper LE, core and proximal hip recruitment and positioning and eccentric body weight control with ambulation re-education including up and down stairs     Home Exercise Program:    [x] (56864) Reviewed/Progressed HEP activities related to strengthening, flexibility, endurance, ROM of core, proximal hip and LE for functional self-care, mobility, lifting and ambulation/stair navigation   [x] (78431)Reviewed/Progressed HEP activities related to improving balance, coordination, kinesthetic sense, posture, motor skill, proprioception of core, proximal hip and LE for self care, mobility, lifting, and ambulation/stair navigation      Manual Treatments:  PROM / STM / Oscillations-Mobs:  G-I, II, III, IV (PA's, Inf., Post.)  [x] (79331) Provided manual therapy to mobilize LE, proximal hip and/or LS spine soft tissue/joints for the purpose of modulating pain, promoting relaxation,  increasing ROM, reducing/eliminating soft tissue swelling/inflammation/restriction, improving soft tissue extensibility and allowing for proper ROM for normal function with self care, mobility, lifting and ambulation. Approval Dates:  CPT Code Units Approved Units Used  Date Updated:                     Charges:  Timed Code Treatment Minutes: 42   Total Treatment Minutes: 52      [] EVAL (LOW) 42396 (typically 20 minutes face-to-face)  [] EVAL (MOD) 26399 (typically 30 minutes face-to-face)  [] EVAL (HIGH) 59224 (typically 45 minutes face-to-face)  [] RE-EVAL     [x] RUKHSANA(66888) x     [] Dry needle 1 or 2 Muscles (19427)  [x] NMR (33005) x     [] Dry needle 3+ Muscles (95171)  [x] Manual (77868) x     [] Ultrasound (77982) x  [] TA (81267) x     [] Mech Traction (65005)  [] ES(attended) (08566)     [] ES (un) (08804):   [] Vasopump (74479) [] Ionto (19884)   [] Other:    Steafni Hernándezica stated goal: able to walk community distanced w/o AD. [] Progressing: [] Met: [] Not Met: [] Adjusted    Therapist goals for Patient:   Short Term Goals: To be achieved in: 2 weeks  1. Independent in HEP and progression per patient tolerance, in order to prevent re-injury. [] Progressing: [] Met: [] Not Met: [] Adjusted  2.  Patient will have a decrease in pain to facilitate improvement in movement, function, and ADLs as indicated by Functional Deficits. [] Progressing: [] Met: [] Not Met: [] Adjusted    Long Term Goals: To be achieved in: at discharge  1. Decrease womac functional outcome score from 52% to 40% to assist with reaching prior level of function. [] Progressing: [] Met: [] Not Met: [] Adjusted  2. Patient will demonstrate increased AROM to 0-120* to allow for proper joint functioning as indicated by patients Functional Deficits. [] Progressing: [] Met: [] Not Met: [] Adjusted  3. Patient will demonstrate an increase in Strength to good proximal hip strength and control, within 5lb HHD in LE to allow for proper functional mobility as indicated by patients Functional Deficits. [] Progressing: [] Met: [] Not Met: [] Adjusted  4. Patient will return to all functional activities without increased symptoms or restriction. [] Progressing: [] Met: [] Not Met: [] Adjusted  5. Able to ambulate stairs reciprocally w/ 1 rail or less. [] Progressing: [] Met: [] Not Met: [] Adjusted       ASSESSMENT:  Pt has s/s consistent w/ recent TKA surgery. He has significant Rom and strength and functional deficits and needs skilled PT to restore his mobility, strength, and stability back to at least his baseline. Shantelle session well today. ROM a few degrees better flexion and extension was quite a bit better, and gait was better. Needs cues to avoid guarding and to relax into mobility work. Mod fatigue at end of session doing around 2 sets of each exercise. Treatment/Activity Tolerance:  [x] Patient tolerated treatment well [] Patient limited by fatique  [] Patient limited by pain  [] Patient limited by other medical complications  [] Other:     Overall Progression Towards Functional goals/ Treatment Progress Update:  [] Patient is progressing as expected towards functional goals listed. [] Progression is slowed due to complexities/Impairments listed. [] Progression has been slowed due to co-morbidities.   [x] Plan just implemented, too soon to assess goals progression <30days   [] Goals require adjustment due to lack of progress  [] Patient is not progressing as expected and requires additional follow up with physician  [] Other    Prognosis for POC: [x] Good [] Fair  [] Poor    Patient requires continued skilled intervention: [x] Yes  [] No        PLAN:   1/9: PT recommends he cont up to 2x/wk for up to 8-12 weeks depending on medical necessity and restoration of function and goals met. [] Continue per plan of care [] Alter current plan (see comments)  [x] Plan of care initiated [] Hold pending MD visit [] Discharge    Electronically signed by: Angelique Holloway PT , DPT  #[id number]      Note: If patient does not return for scheduled/recommended follow up visits, this note will serve as a discharge from care along with the most recent update on progress.

## 2023-01-12 NOTE — TELEPHONE ENCOUNTER
From: Clara Burleson  To: Dr. Blackburn Grand: 1/12/2023 12:14 PM EST  Subject: driving after knee surgery    During our recent meeting I thought you said that driving is permitted, but not when under the influence of the oxycodone. Question: How long after taking a 5mg oxycodone pill would it be OK to drive? Also, what is the process for receiving an \"OK to return to work\" letter from Dr. Herbert Ahuja?  Does this require an in-person visit to receive this approval?  thanks,  Jaden Ivy

## 2023-01-16 ENCOUNTER — HOSPITAL ENCOUNTER (OUTPATIENT)
Dept: PHYSICAL THERAPY | Age: 63
Setting detail: THERAPIES SERIES
Discharge: HOME | End: 2023-01-16
Payer: COMMERCIAL

## 2023-01-16 NOTE — FLOWSHEET NOTE
[place] [facility], [facility]  [address] [facility] [place], [facility]  Phone: [phone]   Fax:     [phone]    Physical Therapy  Cancellation/No-show Note  Patient Name:  Maude Weston  :  1960   Date:  2023  Cancelled visits to date: 1  No-shows to date: 0    Patient status for today's appointment patient:  [x]  Cancelled  []  Rescheduled appointment  []  No-show     Reason given by patient:  [x]  Patient ill  []  Conflicting appointment  []  No transportation    []  Conflict with work  []  No reason given  []  Other:     Comments:      Phone call information:   []  Phone call made today to patient at _ time at number provided:      []  Patient answered, conversation as follows:    []  Patient did not answer, message left as follows:    [x]  Phone call not made today    Electronically signed by:  Zayda Alatorre, PTA  [id number]

## 2023-01-19 ENCOUNTER — HOSPITAL ENCOUNTER (OUTPATIENT)
Dept: PHYSICAL THERAPY | Age: 63
Setting detail: THERAPIES SERIES
Discharge: HOME | End: 2023-01-19
Payer: COMMERCIAL

## 2023-01-19 ENCOUNTER — PATIENT MESSAGE (OUTPATIENT)
Dept: ORTHOPEDIC SURGERY | Age: 63
End: 2023-01-19

## 2023-01-19 PROCEDURE — 97110 THERAPEUTIC EXERCISES: CPT

## 2023-01-19 PROCEDURE — 97140 MANUAL THERAPY 1/> REGIONS: CPT

## 2023-01-19 NOTE — TELEPHONE ENCOUNTER
From: Glorine Duane Ellerhorst  To: Dr. Velma Le: 1/19/2023 1:22 PM EST  Subject: stomach distress and sleep issues    Kitty VILCHIS,  I am having some stomach issues that I believe is a result of using Ibuprofen in place of the oxyodone which I am backing off of. Is this a reasonable assumption? I have eliminated the ibuprofen and hope the sour stomach goes away. Any suggestion on how to rid myself of this issue? Also, am really struggling getting sleep at night. I may sleep for an hour or two and then be awake the remainder of the night. Is this common? Any suggestions?  Thanks, [employer].

## 2023-01-19 NOTE — FLOWSHEET NOTE
[facility] - Outpatient Rehabilitation and Therapy, [facility]  [address] [facility], [facility]  Phone: [phone]   Fax:     [phone]      Physical Therapy Treatment Note/ Progress Report:     Date:  2023    Patient Name:  Josi Alves    :  1960  MRN: [id number]    Pertinent Medical History:     Medical/Treatment Diagnosis Information:  Medical Diagnosis: Post-traumatic osteoarthritis of left knee [M17.32]  Treatment Diagnosis: s/p TKA, limited mobility, function, pain. Insurance/Certification information:     Physician Information:  Ruben Johnson MD  Plan of care signed (Y/N):     Date of Patient follow up with Physician:      Progress Report: []  Yes  [x]  No     Date Range for reporting period:  Beginnin2023  Ending:      Progress report due (10 Rx/or 30 days whichever is less): #60     Recertification due (POC duration/ or 90 days whichever is less):      Visit # POC/Insurance Allowable Auth Needed   3 / 10 AIM 10 visits till  []Yes   []No     Latex Allergy:  [x]NO      []YES  Preferred Language for Healthcare:   [x]English       []Other:    Functional Scale:       Date assessed: at eval  Test:womac  Score:52% disability    Pain level:  /10     History of Injury:  Pt here for re-eval s/p L TKA DOS 22. He is amb w/ FWW with mod limp and mod TKE deficit. W/o walker, he has severe limp and limited Wbing on affected side. He reports long hx of knee problems stemming from the  when he had ACL and meniscus surgery. He was walking with crutches just prior to surgery. He works as an  and hopes to return to full function. He did have a few weeks of home PT and that went well. He has removed his post-op dressing, and he has self applied 2 strips of tape horizontally across his incision as he was in fear of his incision busting open.  Knee is moderately swollen, but incision appears to be healing well. SUBJECTIVE:    1/12: pt reports doing ok. Trying to do HEP 3x/day and knee feels stiff. He is walking with less limp however. 1/19: pt states his knee feels stiff today. He is concerned about PT visit costs, so he inquired about reducing to 1x/wk. States he has been pushing hard as he can tolerate at home 3x/day, but pain is getting to be too much. He is considering going back to work which would be a sitting job in the next week or so, but he asked his surgeon who stated it was probably a bit too early. OBJECTIVE:  Observation:   Test measurements:    ROM Prehab eval 1/9 1/12 1/19     knee R= 5* to 130*  L=10* to 130* L= 15* to 95* cold  Best: 8* to 99*  AAROM:  2 to 98*    PROM: 0 to 101* AAROM: (heel slide)   94*    PROM: 104*                                         Strength         Knee ext  At least 4-/5       Knee flex  At least 4-/5                                  TESTS/ OTHER         Circumference (superior patella)  R=44.5cm  L=48cm                                                 RESTRICTIONS/PRECAUTIONS:     Exercises/Interventions:     Therapeutic Ex (28316)   Min: Reps/Resistance Notes/CUES   cardio > Try rec bike once ROM >105*   AAROM Heel slide w/ belt x 15    Seated flexion w/ LLLD hold: 20sec x 5    LAQ 2x10    HS curl         ST hip abd 2x10 ea Min UE assist   EOB hip ext 2x10 ea         Heel/ toe raises X15                   Pt edu Reviewed HEP w/ emphasis on ROM, and restoring quad activation    Therapeutic Activity (85451) Min:      Pt edu Applied compression stocking to knee for assisting with edema control, pt edu re: need for early ROM and milestones, and recommendations for focus on PT until meeting certain milestones with recovery. NMR re-education (44047)  Min:  CUES NEEDED   QS Bilat supine 10sec x 6    squat Mini squat w/ TKE emphasis: 10x, 8sec TKE    gait 1 clinic lap w/ SPC Fairly good gait.          Manual Intervention (38541) Min:      Knee manual PROM, PA mobs, patellar mobs, scar mobs, HS str, hip PROM                        Modalities  Min: VASO not covered by insurance    Leg elevated on bolster   Cold pack Resume NV          Other Therapeutic Activities: Pt was educated on PT POC, Diagnosis, Prognosis, pathomechanics as well as frequency and duration of scheduling future physical therapy appointments. Time was also taken on this day to answer all patient questions and participation in PT. Reviewed appointment policy in detail with patient and patient verbalized understanding. Home Exercise Program: Patient was instructed in the following for HEP:       1/9: Access Code: [email or web address]  URL: [email or web address]  Date: 01/09/2023  Prepared by: Leland Arnold    Exercises  Supine Heel Slide with Strap - 2-3 x daily - 4-6 x weekly - 2-3 sets - 10-15 reps  Supine Quadricep Sets - 1-3 x daily - 4-6 x weekly - 2-3 sets - 10 reps  Seated Long Arc Quad - 1-3 x daily - 4-6 x weekly - 2-3 sets - 10 reps  Mini Squat with Counter Support - 2-3 x daily - 4-6 x weekly - 2-3 sets - 10-15 reps  Heel Toe Raises with Counter Support - 1-3 x daily - 4-6 x weekly - 2-3 sets - 10 reps  Standing Hip Abduction with Counter Support - 2-3 x daily - 4-6 x weekly - 2-3 sets - 10-15 reps  Prone Hip Extension on Table - 2-3 x daily - 4-6 x weekly - 2-3 sets - 10-15 reps        . Patient verbalized/demonstrated understanding and was issued written handout. Therapeutic Exercise and NMR EXR  [x] (28666) Provided verbal/tactile cueing for activities related to strengthening, flexibility, endurance, ROM for improvements in LE, proximal hip, and core control with self care, mobility, lifting, ambulation.   [x] (42381) Provided verbal/tactile cueing for activities related to improving balance, coordination, kinesthetic sense, posture, motor skill, proprioception  to assist with LE, proximal hip, and core control in self care, mobility, lifting, ambulation and eccentric single leg control. [address] and Therapeutic Activities:    [x] (93387 or 31369) Provided verbal/tactile cueing for activities related to improving balance, coordination, kinesthetic sense, posture, motor skill, proprioception and motor activation to allow for proper function of core, proximal hip and LE with self care and ADLs and functional mobility. [x] (64589) Gait Re-education- Provided training and instruction to the patient for proper LE, core and proximal hip recruitment and positioning and eccentric body weight control with ambulation re-education including up and down stairs     Home Exercise Program:    [x] (94535) Reviewed/Progressed HEP activities related to strengthening, flexibility, endurance, ROM of core, proximal hip and LE for functional self-care, mobility, lifting and ambulation/stair navigation   [x] (53645)Reviewed/Progressed HEP activities related to improving balance, coordination, kinesthetic sense, posture, motor skill, proprioception of core, proximal hip and LE for self care, mobility, lifting, and ambulation/stair navigation      Manual Treatments:  PROM / STM / Oscillations-Mobs:  G-I, II, III, IV (PA's, Inf., Post.)  [x] (42302) Provided manual therapy to mobilize LE, proximal hip and/or LS spine soft tissue/joints for the purpose of modulating pain, promoting relaxation,  increasing ROM, reducing/eliminating soft tissue swelling/inflammation/restriction, improving soft tissue extensibility and allowing for proper ROM for normal function with self care, mobility, lifting and ambulation.        Approval Dates:  CPT Code Units Approved Units Used  Date Updated:                     Charges:  Timed Code Treatment Minutes: 40   Total Treatment Minutes: 40      [] EVAL (LOW) 38517 (typically 20 minutes face-to-face)  [] EVAL (MOD) 72158 (typically 30 minutes face-to-face)  [] EVAL (HIGH) 80086 (typically 45 minutes face-to-face)  [] RE-EVAL     [x] SP(87861) x 2    [] Dry needle 1 or 2 Muscles (12909)  [] NMR (09244) x     [] Dry needle 3+ Muscles (44217)  [x] Manual (87071) x     [] Ultrasound (12852) x  [] TA (22595) x     [] Mech Traction (92819)  [] ES(attended) (24194)     [] ES (un) (92623):   [] Vasopump (48943) [] Ionto (86183)   [] Other:    Osvaldo Postal stated goal: able to walk community distanced w/o AD. [] Progressing: [] Met: [] Not Met: [] Adjusted    Therapist goals for Patient:   Short Term Goals: To be achieved in: 2 weeks  1. Independent in HEP and progression per patient tolerance, in order to prevent re-injury. [] Progressing: [] Met: [] Not Met: [] Adjusted  2. Patient will have a decrease in pain to facilitate improvement in movement, function, and ADLs as indicated by Functional Deficits. [] Progressing: [] Met: [] Not Met: [] Adjusted    Long Term Goals: To be achieved in: at discharge  1. Decrease womac functional outcome score from 52% to 40% to assist with reaching prior level of function. [] Progressing: [] Met: [] Not Met: [] Adjusted  2. Patient will demonstrate increased AROM to 0-120* to allow for proper joint functioning as indicated by patients Functional Deficits. [] Progressing: [] Met: [] Not Met: [] Adjusted  3. Patient will demonstrate an increase in Strength to good proximal hip strength and control, within 5lb HHD in LE to allow for proper functional mobility as indicated by patients Functional Deficits. [] Progressing: [] Met: [] Not Met: [] Adjusted  4. Patient will return to all functional activities without increased symptoms or restriction. [] Progressing: [] Met: [] Not Met: [] Adjusted  5. Able to ambulate stairs reciprocally w/ 1 rail or less. [] Progressing: [] Met: [] Not Met: [] Adjusted       ASSESSMENT:  Pt has s/s consistent w/ recent TKA surgery.  He has significant Rom and strength and functional deficits and needs skilled PT to restore his mobility, strength, and stability back to at least his baseline. Shantelle session well. NELSON started out a bit stiff, but passively, seems to be gaining a few degrees. Still fairly sore with ROM work, but gait is improving w/ less limp most of the time. Lots of discussion helping him to work thru rehab and how to best manage his desire to return to work, but the need to focus on rehab until he meets certain milestones. Treatment/Activity Tolerance:  [x] Patient tolerated treatment well [] Patient limited by fatique  [] Patient limited by pain  [] Patient limited by other medical complications  [] Other:     Overall Progression Towards Functional goals/ Treatment Progress Update:  [] Patient is progressing as expected towards functional goals listed. [] Progression is slowed due to complexities/Impairments listed. [] Progression has been slowed due to co-morbidities. [x] Plan just implemented, too soon to assess goals progression <30days   [] Goals require adjustment due to lack of progress  [] Patient is not progressing as expected and requires additional follow up with physician  [] Other    Prognosis for POC: [x] Good [] Fair  [] Poor    Patient requires continued skilled intervention: [x] Yes  [] No        PLAN:   1/9: PT recommends he cont up to 2x/wk for up to 8-12 weeks depending on medical necessity and restoration of function and goals met. [] Continue per plan of care [] Alter current plan (see comments)  [x] Plan of care initiated [] Hold pending MD visit [] Discharge    Electronically signed by: Merrick Patel, PT , DPT  #[id number]      Note: If patient does not return for scheduled/recommended follow up visits, this note will serve as a discharge from care along with the most recent update on progress.

## 2023-01-23 ENCOUNTER — HOSPITAL ENCOUNTER (OUTPATIENT)
Dept: PHYSICAL THERAPY | Age: 63
Setting detail: THERAPIES SERIES
Discharge: HOME | End: 2023-01-23
Payer: COMMERCIAL

## 2023-01-23 PROCEDURE — 97110 THERAPEUTIC EXERCISES: CPT

## 2023-01-23 PROCEDURE — 97140 MANUAL THERAPY 1/> REGIONS: CPT

## 2023-01-23 NOTE — FLOWSHEET NOTE
[facility] - Outpatient Rehabilitation and Therapy, [facility]  [address] [facility], [facility]  Phone: [phone]   Fax:     [phone]      Physical Therapy Treatment Note/ Progress Report:     Date:  2023    Patient Name:  Santosh Sánchez    :  1960  MRN: [id number]    Pertinent Medical History:     Medical/Treatment Diagnosis Information:  Medical Diagnosis: Post-traumatic osteoarthritis of left knee [M17.32]  Treatment Diagnosis: s/p TKA, limited mobility, function, pain. Insurance/Certification information:     Physician Information:  Ramiro Zhong MD  Plan of care signed (Y/N):     Date of Patient follow up with Physician:      Progress Report: []  Yes  [x]  No     Date Range for reporting period:  Beginnin2023  Ending:      Progress report due (10 Rx/or 30 days whichever is less): #79     Recertification due (POC duration/ or 90 days whichever is less):      Visit # POC/Insurance Allowable Auth Needed   4 / 10 AIM 10 visits till  []Yes   []No     Latex Allergy:  [x]NO      []YES  Preferred Language for Healthcare:   [x]English       []Other:    Functional Scale:       Date assessed: at eval  Test:womac  Score:52% disability    Pain level:  /10     History of Injury:  Pt here for re-eval s/p L TKA DOS 22. He is amb w/ FWW with mod limp and mod TKE deficit. W/o walker, he has severe limp and limited Wbing on affected side. He reports long hx of knee problems stemming from the  when he had ACL and meniscus surgery. He was walking with crutches just prior to surgery. He works as an  and hopes to return to full function. He did have a few weeks of home PT and that went well. He has removed his post-op dressing, and he has self applied 2 strips of tape horizontally across his incision as he was in fear of his incision busting open.  Knee is moderately swollen, but incision appears to be healing well. SUBJECTIVE:    1/12: pt reports doing ok. Trying to do HEP 3x/day and knee feels stiff. He is walking with less limp however. 1/19: pt states his knee feels stiff today. He is concerned about PT visit costs, so he inquired about reducing to 1x/wk. States he has been pushing hard as he can tolerate at home 3x/day, but pain is getting to be too much. He is considering going back to work which would be a sitting job in the next week or so, but he asked his surgeon who stated it was probably a bit too early. 1/23: pt states he has been working aggressively w/ 2 sessions a day 12 hrs apart, spending approx 30min of ROM work. He gets moderately sore from these sessions, and he does some other ex's during the middle of the day. He hope that today he has a much better ROM number as he is concerned about needing a manipulation.        OBJECTIVE:  Observation:   Test measurements:    ROM Prehab eval 1/9 1/12 1/19 1/23    knee R= 5* to 130*  L=10* to 130* L= 15* to 95* cold  Best: 8* to 99*  AAROM:  2 to 98*    PROM: 0 to 101* AAROM: (heel slide)   94*    PROM: 104* AAROM: 95*    PROM:   105*                                        Strength         Knee ext  At least 4-/5       Knee flex  At least 4-/5                                  TESTS/ OTHER         Circumference (superior patella)  R=44.5cm  L=48cm                                               RESTRICTIONS/PRECAUTIONS:     Exercises/Interventions:     Therapeutic Ex (17028)   Min: Reps/Resistance Notes/CUES   cardio > Try rec bike once ROM >105*   AAROM Prone knee flex/hang str:   15sec x 10    Heel slide w/ belt 15sec x 10       LAQ 11# bilat 0-90* 2x10    HS curl 22# bilat 2x10         ST hip abd 2x10 ea Min UE assist   EOB hip ext 2x10 ea         Heel/ toe raises X15                   Pt edu Reviewed HEP w/ emphasis on ROM, and restoring quad activation    Therapeutic Activity (17793) Min:      Pt edu Pt edu re: relationship re: ROM work, strength work, functional training, etc.                              NMR re-education (85387)  Min:  CUES NEEDED   QS Bilat supine 10sec x 10    squat 0-45* 2x10 Cues for TKE   gait Fairly good gait. Manual Intervention (74319) Min:      Knee manual PROM, PA mobs, patellar mobs, scar mobs, HS str, hip PROM                        Modalities  Min: VASO not covered by insurance    Cold pack Pt deferred          Other Therapeutic Activities: Pt was educated on PT POC, Diagnosis, Prognosis, pathomechanics as well as frequency and duration of scheduling future physical therapy appointments. Time was also taken on this day to answer all patient questions and participation in PT. Reviewed appointment policy in detail with patient and patient verbalized understanding. Home Exercise Program: Patient was instructed in the following for HEP:       1/9: Access Code: [email or web address]  URL: [email or web address]  Date: 01/09/2023  Prepared by: Aimee Zheng    Exercises  Supine Heel Slide with Strap - 2-3 x daily - 4-6 x weekly - 2-3 sets - 10-15 reps  Supine Quadricep Sets - 1-3 x daily - 4-6 x weekly - 2-3 sets - 10 reps  Seated Long Arc Quad - 1-3 x daily - 4-6 x weekly - 2-3 sets - 10 reps  Mini Squat with Counter Support - 2-3 x daily - 4-6 x weekly - 2-3 sets - 10-15 reps  Heel Toe Raises with Counter Support - 1-3 x daily - 4-6 x weekly - 2-3 sets - 10 reps  Standing Hip Abduction with Counter Support - 2-3 x daily - 4-6 x weekly - 2-3 sets - 10-15 reps  Prone Hip Extension on Table - 2-3 x daily - 4-6 x weekly - 2-3 sets - 10-15 reps        . Patient verbalized/demonstrated understanding and was issued written handout. Therapeutic Exercise and NMR EXR  [x] (55001) Provided verbal/tactile cueing for activities related to strengthening, flexibility, endurance, ROM for improvements in LE, proximal hip, and core control with self care, mobility, lifting, ambulation.   [x] (30893) Provided verbal/tactile cueing for activities related to improving balance, coordination, kinesthetic sense, posture, motor skill, proprioception  to assist with LE, proximal hip, and core control in self care, mobility, lifting, ambulation and eccentric single leg control. [address] and Therapeutic Activities:    [x] (19340 or 73583) Provided verbal/tactile cueing for activities related to improving balance, coordination, kinesthetic sense, posture, motor skill, proprioception and motor activation to allow for proper function of core, proximal hip and LE with self care and ADLs and functional mobility. [x] (89830) Gait Re-education- Provided training and instruction to the patient for proper LE, core and proximal hip recruitment and positioning and eccentric body weight control with ambulation re-education including up and down stairs     Home Exercise Program:    [x] (82150) Reviewed/Progressed HEP activities related to strengthening, flexibility, endurance, ROM of core, proximal hip and LE for functional self-care, mobility, lifting and ambulation/stair navigation   [x] (78270)Reviewed/Progressed HEP activities related to improving balance, coordination, kinesthetic sense, posture, motor skill, proprioception of core, proximal hip and LE for self care, mobility, lifting, and ambulation/stair navigation      Manual Treatments:  PROM / STM / Oscillations-Mobs:  G-I, II, III, IV (PA's, Inf., Post.)  [x] (47352) Provided manual therapy to mobilize LE, proximal hip and/or LS spine soft tissue/joints for the purpose of modulating pain, promoting relaxation,  increasing ROM, reducing/eliminating soft tissue swelling/inflammation/restriction, improving soft tissue extensibility and allowing for proper ROM for normal function with self care, mobility, lifting and ambulation.        Approval Dates:  CPT Code Units Approved Units Used  Date Updated:                     Charges:  Timed Code Treatment Minutes: 40   Total Treatment Minutes: 40      [] EVAL (LOW) 05205 (typically 20 minutes face-to-face)  [] EVAL (MOD) 80399 (typically 30 minutes face-to-face)  [] EVAL (HIGH) 72913 (typically 45 minutes face-to-face)  [] RE-EVAL     [x] GL(50044) x 2    [] Dry needle 1 or 2 Muscles (81589)  [] NMR (23644) x     [] Dry needle 3+ Muscles (74999)  [x] Manual (25651) x     [] Ultrasound (19717) x  [] TA (01256) x     [] Mech Traction (96278)  [] ES(attended) (17220)     [] ES (un) (66787):   [] Vasopump (26010) [] Ionto (88615)   [] Other:    Zaira Lazo stated goal: able to walk community distanced w/o AD. [] Progressing: [] Met: [] Not Met: [] Adjusted    Therapist goals for Patient:   Short Term Goals: To be achieved in: 2 weeks  1. Independent in HEP and progression per patient tolerance, in order to prevent re-injury. [] Progressing: [] Met: [] Not Met: [] Adjusted  2. Patient will have a decrease in pain to facilitate improvement in movement, function, and ADLs as indicated by Functional Deficits. [] Progressing: [] Met: [] Not Met: [] Adjusted    Long Term Goals: To be achieved in: at discharge  1. Decrease womac functional outcome score from 52% to 40% to assist with reaching prior level of function. [] Progressing: [] Met: [] Not Met: [] Adjusted  2. Patient will demonstrate increased AROM to 0-120* to allow for proper joint functioning as indicated by patients Functional Deficits. [] Progressing: [] Met: [] Not Met: [] Adjusted  3. Patient will demonstrate an increase in Strength to good proximal hip strength and control, within 5lb HHD in LE to allow for proper functional mobility as indicated by patients Functional Deficits. [] Progressing: [] Met: [] Not Met: [] Adjusted  4. Patient will return to all functional activities without increased symptoms or restriction. [] Progressing: [] Met: [] Not Met: [] Adjusted  5. Able to ambulate stairs reciprocally w/ 1 rail or less.   [] Progressing: [] Met: [] Not Met: [] Adjusted       ASSESSMENT:  Pt has s/s consistent w/ recent TKA surgery. He has significant Rom and strength and functional deficits and needs skilled PT to restore his mobility, strength, and stability back to at least his baseline. Shantelle session well. ROM is similar the past couple sessions. We did discuss today potentially adjusting his home program a bit which he seemed agreeable to. Treatment/Activity Tolerance:  [x] Patient tolerated treatment well [] Patient limited by fatique  [] Patient limited by pain  [] Patient limited by other medical complications  [] Other:     Overall Progression Towards Functional goals/ Treatment Progress Update:  [] Patient is progressing as expected towards functional goals listed. [] Progression is slowed due to complexities/Impairments listed. [] Progression has been slowed due to co-morbidities. [x] Plan just implemented, too soon to assess goals progression <30days   [] Goals require adjustment due to lack of progress  [] Patient is not progressing as expected and requires additional follow up with physician  [] Other    Prognosis for POC: [x] Good [] Fair  [] Poor    Patient requires continued skilled intervention: [x] Yes  [] No        PLAN:   1/9: PT recommends he cont up to 2x/wk for up to 8-12 weeks depending on medical necessity and restoration of function and goals met. [] Continue per plan of care [] Alter current plan (see comments)  [x] Plan of care initiated [] Hold pending MD visit [] Discharge    Electronically signed by: Marely Gallo PT , DPT  #[id number]      Note: If patient does not return for scheduled/recommended follow up visits, this note will serve as a discharge from care along with the most recent update on progress.

## 2023-01-26 ENCOUNTER — HOSPITAL ENCOUNTER (OUTPATIENT)
Dept: PHYSICAL THERAPY | Age: 63
Setting detail: THERAPIES SERIES
Discharge: HOME | End: 2023-01-26
Payer: COMMERCIAL

## 2023-01-26 PROCEDURE — 97140 MANUAL THERAPY 1/> REGIONS: CPT

## 2023-01-26 PROCEDURE — 97110 THERAPEUTIC EXERCISES: CPT

## 2023-01-26 NOTE — FLOWSHEET NOTE
[facility] - Outpatient Rehabilitation and Therapy, [facility]  [address] [facility], [facility]  Phone: [phone]   Fax:     [phone]      Physical Therapy Treatment Note/ Progress Report:     Date:  2023    Patient Name:  Karen Love    :  1960  MRN: [id number]    Pertinent Medical History:     Medical/Treatment Diagnosis Information:  Medical Diagnosis: Post-traumatic osteoarthritis of left knee [M17.32]  Treatment Diagnosis: s/p TKA, limited mobility, function, pain. Insurance/Certification information:     Physician Information:  Adriel Arzola MD  Plan of care signed (Y/N):     Date of Patient follow up with Physician:      Progress Report: []  Yes  [x]  No     Date Range for reporting period:  Beginnin2023  Ending:      Progress report due (10 Rx/or 30 days whichever is less): #26     Recertification due (POC duration/ or 90 days whichever is less):      Visit # POC/Insurance Allowable Auth Needed    / 10 AIM 10 visits till  []Yes   []No     Latex Allergy:  [x]NO      []YES  Preferred Language for Healthcare:   [x]English       []Other:    Functional Scale:       Date assessed: at eval  Test:womac  Score:52% disability    Pain level:  /10     History of Injury:  Pt here for re-eval s/p L TKA DOS 22. He is amb w/ FWW with mod limp and mod TKE deficit. W/o walker, he has severe limp and limited Wbing on affected side. He reports long hx of knee problems stemming from the  when he had ACL and meniscus surgery. He was walking with crutches just prior to surgery. He works as an  and hopes to return to full function. He did have a few weeks of home PT and that went well. He has removed his post-op dressing, and he has self applied 2 strips of tape horizontally across his incision as he was in fear of his incision busting open.  Knee is moderately swollen, but incision appears to be healing well.       SUBJECTIVE:    1/12: pt reports doing ok. Trying to do HEP 3x/day and knee feels stiff. He is walking with less limp however.   1/19: pt states his knee feels stiff today. He is concerned about PT visit costs, so he inquired about reducing to 1x/wk. States he has been pushing hard as he can tolerate at home 3x/day, but pain is getting to be too much. He is considering going back to work which would be a sitting job in the next week or so, but he asked his surgeon who stated it was probably a bit too early.   1/23: pt states he has been working aggressively w/ 2 sessions a day 12 hrs apart, spending approx 30min of ROM work. He gets moderately sore from these sessions, and he does some other ex's during the middle of the day. He hope that today he has a much better ROM number as he is concerned about needing a manipulation.   1/26: knee feeling a little less bothered since backing off the Wbing stretches.       OBJECTIVE:  Observation:   Test measurements:    ROM Prehab eval 1/9 1/12 1/19 1/23 1/26   knee R= 5* to 130*  L=10* to 130* L= 15* to 95* cold  Best: 8* to 99*  AAROM:  2 to 98*    PROM: 0 to 101* AAROM: (heel slide)   94*    PROM: 104* AAROM: 95*    PROM:   105* AAROM: 99*    PROM:  105*                                       Strength         Knee ext  At least 4-/5       Knee flex  At least 4-/5                                  TESTS/ OTHER         Circumference (superior patella)  R=44.5cm  L=48cm                                               RESTRICTIONS/PRECAUTIONS:     Exercises/Interventions:     Therapeutic Ex (74754)   Min: Reps/Resistance Notes/CUES   cardio Rec bike  x5' Arcs to full rev per laisha   AAROM Prone knee flex/hang str:   15sec x 10    Heel slide w/ belt 15sec x 10       LAQ   L Grn TB x 12    HS curl   L grn TB x 12         ST hip abd Min UE assist   EOB hip ext         Heel/ toe raises                   Pt edu Reviewed HEP w/ emphasis on ROM, and restoring quad  activation    Therapeutic Activity (54273) Min:      Pt edu Pt edu re: relationship re: ROM work, strength work, functional training, etc.                              NMR re-education (72495)  Min:  CUES NEEDED   QS Bilat supine 10sec x 7    squat RESUME    Cues for TKE   gait 1 clinic lap w/ SPC Cues to avoid compensations/ limp        Manual Intervention (77555) Min:      Knee manual PROM, flex w/ wedge mobs, PA mobs, patellar mobs, scar mobs, HS str, hip PROM                        Modalities  Min: VASO not covered by insurance    Cold pack Pt deferred          Other Therapeutic Activities: Pt was educated on PT POC, Diagnosis, Prognosis, pathomechanics as well as frequency and duration of scheduling future physical therapy appointments. Time was also taken on this day to answer all patient questions and participation in PT. Reviewed appointment policy in detail with patient and patient verbalized understanding. Home Exercise Program: Patient was instructed in the following for HEP:       1/9: Access Code: [email or web address]  URL: [email or web address]  Date: 01/09/2023  Prepared by: Arlean Saint    Exercises  Supine Heel Slide with Strap - 2-3 x daily - 4-6 x weekly - 2-3 sets - 10-15 reps  Supine Quadricep Sets - 1-3 x daily - 4-6 x weekly - 2-3 sets - 10 reps  Seated Long Arc Quad - 1-3 x daily - 4-6 x weekly - 2-3 sets - 10 reps  Mini Squat with Counter Support - 2-3 x daily - 4-6 x weekly - 2-3 sets - 10-15 reps  Heel Toe Raises with Counter Support - 1-3 x daily - 4-6 x weekly - 2-3 sets - 10 reps  Standing Hip Abduction with Counter Support - 2-3 x daily - 4-6 x weekly - 2-3 sets - 10-15 reps  Prone Hip Extension on Table - 2-3 x daily - 4-6 x weekly - 2-3 sets - 10-15 reps        . Patient verbalized/demonstrated understanding and was issued written handout.       Therapeutic Exercise and NMR EXR  [x] (88991) Provided verbal/tactile cueing for activities related to strengthening, flexibility, endurance, ROM for improvements in LE, proximal hip, and core control with self care, mobility, lifting, ambulation. [x] (65078) Provided verbal/tactile cueing for activities related to improving balance, coordination, kinesthetic sense, posture, motor skill, proprioception  to assist with LE, proximal hip, and core control in self care, mobility, lifting, ambulation and eccentric single leg control. [address] and Therapeutic Activities:    [x] (28807 or 73678) Provided verbal/tactile cueing for activities related to improving balance, coordination, kinesthetic sense, posture, motor skill, proprioception and motor activation to allow for proper function of core, proximal hip and LE with self care and ADLs and functional mobility. [x] (46396) Gait Re-education- Provided training and instruction to the patient for proper LE, core and proximal hip recruitment and positioning and eccentric body weight control with ambulation re-education including up and down stairs     Home Exercise Program:    [x] (17220) Reviewed/Progressed HEP activities related to strengthening, flexibility, endurance, ROM of core, proximal hip and LE for functional self-care, mobility, lifting and ambulation/stair navigation   [x] (52653)Reviewed/Progressed HEP activities related to improving balance, coordination, kinesthetic sense, posture, motor skill, proprioception of core, proximal hip and LE for self care, mobility, lifting, and ambulation/stair navigation      Manual Treatments:  PROM / STM / Oscillations-Mobs:  G-I, II, III, IV (PA's, Inf., Post.)  [x] (52197) Provided manual therapy to mobilize LE, proximal hip and/or LS spine soft tissue/joints for the purpose of modulating pain, promoting relaxation,  increasing ROM, reducing/eliminating soft tissue swelling/inflammation/restriction, improving soft tissue extensibility and allowing for proper ROM for normal function with self care, mobility, lifting and ambulation. Approval Dates:  CPT Code Units Approved Units Used  Date Updated:                     Charges:  Timed Code Treatment Minutes: 40   Total Treatment Minutes: 40      [] EVAL (LOW) 42347 (typically 20 minutes face-to-face)  [] EVAL (MOD) 31486 (typically 30 minutes face-to-face)  [] EVAL (HIGH) 82715 (typically 45 minutes face-to-face)  [] RE-EVAL     [x] LM(70676) x     [] Dry needle 1 or 2 Muscles (49253)  [] NMR (95651) x     [] Dry needle 3+ Muscles (90676)  [x] Manual (65355) x2     [] Ultrasound (88121) x  [] TA (35525) x     [] Mech Traction (80114)  [] ES(attended) (28331)     [] ES (un) (84255):   [] Vasopump (43599) [] Ionto (31105)   [] Other:    Oral Kaiser stated goal: able to walk community distanced w/o AD. [] Progressing: [] Met: [] Not Met: [] Adjusted    Therapist goals for Patient:   Short Term Goals: To be achieved in: 2 weeks  1. Independent in HEP and progression per patient tolerance, in order to prevent re-injury. [] Progressing: [] Met: [] Not Met: [] Adjusted  2. Patient will have a decrease in pain to facilitate improvement in movement, function, and ADLs as indicated by Functional Deficits. [] Progressing: [] Met: [] Not Met: [] Adjusted    Long Term Goals: To be achieved in: at discharge  1. Decrease womac functional outcome score from 52% to 40% to assist with reaching prior level of function. [] Progressing: [] Met: [] Not Met: [] Adjusted  2. Patient will demonstrate increased AROM to 0-120* to allow for proper joint functioning as indicated by patients Functional Deficits. [] Progressing: [] Met: [] Not Met: [] Adjusted  3. Patient will demonstrate an increase in Strength to good proximal hip strength and control, within 5lb HHD in LE to allow for proper functional mobility as indicated by patients Functional Deficits. [] Progressing: [] Met: [] Not Met: [] Adjusted  4. Patient will return to all functional activities without increased symptoms or restriction.    [] Progressing: [] Met: [] Not Met: [] Adjusted  5. Able to ambulate stairs reciprocally w/ 1 rail or less. [] Progressing: [] Met: [] Not Met: [] Adjusted       ASSESSMENT:  Pt has s/s consistent w/ recent TKA surgery. He has significant Rom and strength and functional deficits and needs skilled PT to restore his mobility, strength, and stability back to at least his baseline. Shantelle session well. Knee felt less aggravated today and more \"calm. \" Able to tolerate ROM work and mobs better. Held some ex's due to time spend on mobility work and adding bike today. Treatment/Activity Tolerance:  [x] Patient tolerated treatment well [] Patient limited by fatique  [] Patient limited by pain  [] Patient limited by other medical complications  [] Other:     Overall Progression Towards Functional goals/ Treatment Progress Update:  [] Patient is progressing as expected towards functional goals listed. [] Progression is slowed due to complexities/Impairments listed. [] Progression has been slowed due to co-morbidities. [x] Plan just implemented, too soon to assess goals progression <30days   [] Goals require adjustment due to lack of progress  [] Patient is not progressing as expected and requires additional follow up with physician  [] Other    Prognosis for POC: [x] Good [] Fair  [] Poor    Patient requires continued skilled intervention: [x] Yes  [] No        PLAN:   1/9: PT recommends he cont up to 2x/wk for up to 8-12 weeks depending on medical necessity and restoration of function and goals met. [] Continue per plan of care [] Alter current plan (see comments)  [x] Plan of care initiated [] Hold pending MD visit [] Discharge    Electronically signed by: Josh James PT , DPT  #[id number]      Note: If patient does not return for scheduled/recommended follow up visits, this note will serve as a discharge from care along with the most recent update on progress.

## 2023-01-30 ENCOUNTER — HOSPITAL ENCOUNTER (OUTPATIENT)
Dept: PHYSICAL THERAPY | Age: 63
Setting detail: THERAPIES SERIES
Discharge: HOME | End: 2023-01-30
Payer: COMMERCIAL

## 2023-01-30 PROCEDURE — 97110 THERAPEUTIC EXERCISES: CPT

## 2023-01-30 PROCEDURE — 97140 MANUAL THERAPY 1/> REGIONS: CPT

## 2023-01-30 NOTE — FLOWSHEET NOTE
[facility] - Outpatient Rehabilitation and Therapy, [facility]  [address] [facility], [facility]  Phone: [phone]   Fax:     [phone]      Physical Therapy Treatment Note/ Progress Report:     Date:  2023    Patient Name:  Bethanie Ruff    :  1960  MRN: [id number]    Pertinent Medical History:     Medical/Treatment Diagnosis Information:  Medical Diagnosis: Post-traumatic osteoarthritis of left knee [M17.32]  Treatment Diagnosis: s/p TKA, limited mobility, function, pain. Insurance/Certification information:     Physician Information:  Anibal García MD  Plan of care signed (Y/N):     Date of Patient follow up with Physician:      Progress Report: []  Yes  [x]  No     Date Range for reporting period:  Beginnin2023  Ending:      Progress report due (10 Rx/or 30 days whichever is less): #03     Recertification due (POC duration/ or 90 days whichever is less):      Visit # POC/Insurance Allowable Auth Needed   6 / 10 AIM 10 visits till 23 []Yes   []No     Latex Allergy:  [x]NO      []YES  Preferred Language for Healthcare:   [x]English       []Other:    Functional Scale:       Date assessed: at eval  Test:womac  Score:52% disability    Pain level:  2-3/10     History of Injury:  Pt here for re-eval s/p L TKA DOS 22. He is amb w/ FWW with mod limp and mod TKE deficit. W/o walker, he has severe limp and limited Wbing on affected side. He reports long hx of knee problems stemming from the  when he had ACL and meniscus surgery. He was walking with crutches just prior to surgery. He works as an  and hopes to return to full function. He did have a few weeks of home PT and that went well. He has removed his post-op dressing, and he has self applied 2 strips of tape horizontally across his incision as he was in fear of his incision busting open.  Knee is moderately swollen, but incision appears to be healing well. SUBJECTIVE:    1/12: pt reports doing ok. Trying to do HEP 3x/day and knee feels stiff. He is walking with less limp however. 1/19: pt states his knee feels stiff today. He is concerned about PT visit costs, so he inquired about reducing to 1x/wk. States he has been pushing hard as he can tolerate at home 3x/day, but pain is getting to be too much. He is considering going back to work which would be a sitting job in the next week or so, but he asked his surgeon who stated it was probably a bit too early. 1/23: pt states he has been working aggressively w/ 2 sessions a day 12 hrs apart, spending approx 30min of ROM work. He gets moderately sore from these sessions, and he does some other ex's during the middle of the day. He hope that today he has a much better ROM number as he is concerned about needing a manipulation. 1/26: knee feeling a little less bothered since backing off the Wbing stretches. 1-30-23 \"feels like a block of concrete\" Took oxycotin before PT today.  F/U with MD 2-9-23       OBJECTIVE:  Observation:   Test measurements:    ROM Prehab eval 1/9 1/12 1/19 1/23 1/26 1-30-23   knee R= 5* to 130*  L=10* to 130* L= 15* to 95* cold  Best: 8* to 99*  AAROM:  2 to 98*    PROM: 0 to 101* AAROM: (heel slide)   94*    PROM: 104* AAROM: 95*    PROM:   105* AAROM: 99*    PROM:  105*             PROM  0-106                                           Strength          Knee ext  At least 4-/5        Knee flex  At least 4-/5                                      TESTS/ OTHER          Circumference (superior patella)  R=44.5cm  L=48cm                                                    RESTRICTIONS/PRECAUTIONS:     Exercises/Interventions:     Therapeutic Ex (93865)   Min: Reps/Resistance Notes/CUES   cardio Rec bike  x5' Arcs to full rev per laisha  Full rev with substitutions   AAROM Prone knee flex/hang str:   15sec x 10 L    Heel slide w/ belt 15sec x 10 L          PROM 106   LAQ   L Grn TB x 12    HS curl   L grn TB x 12         ST hip abd Min UE assist   EOB hip ext         Heel/ toe raises                   Pt edu Reviewed HEP w/ emphasis on ROM, and restoring quad activation    Therapeutic Activity (77755) Min:      Pt edu Pt edu re: relationship re: ROM work, strength work, functional training, etc.                              NMR re-education (12595)  Min:  CUES NEEDED   QS Bilat supine 10sec x 7 L     squat RESUME    Cues for TKE   gait To Pt with st cane  Cues to avoid compensations/ limp        Manual Intervention (14774) Min:      Knee manual PROM, flex/ ext  PA mobs,   patellar mobs, scar mobs,   Seated /supine  flex   Supine ext                        Modalities  Min: VASO not covered by insurance    Cold pack Pt deferred          Other Therapeutic Activities: Pt was educated on PT POC, Diagnosis, Prognosis, pathomechanics as well as frequency and duration of scheduling future physical therapy appointments. Time was also taken on this day to answer all patient questions and participation in PT. Reviewed appointment policy in detail with patient and patient verbalized understanding. Home Exercise Program: Patient was instructed in the following for HEP:       1/9: Access Code: [email or web address]  URL: [email or web address]  Date: 01/09/2023  Prepared by: Hank Choudhary    Exercises  Supine Heel Slide with Strap - 2-3 x daily - 4-6 x weekly - 2-3 sets - 10-15 reps  Supine Quadricep Sets - 1-3 x daily - 4-6 x weekly - 2-3 sets - 10 reps  Seated Long Arc Quad - 1-3 x daily - 4-6 x weekly - 2-3 sets - 10 reps  Mini Squat with Counter Support - 2-3 x daily - 4-6 x weekly - 2-3 sets - 10-15 reps  Heel Toe Raises with Counter Support - 1-3 x daily - 4-6 x weekly - 2-3 sets - 10 reps  Standing Hip Abduction with Counter Support - 2-3 x daily - 4-6 x weekly - 2-3 sets - 10-15 reps  Prone Hip Extension on Table - 2-3 x daily - 4-6 x weekly - 2-3 sets - 10-15 reps        .  Patient verbalized/demonstrated understanding and was issued written handout. Therapeutic Exercise and NMR EXR  [x] (22772) Provided verbal/tactile cueing for activities related to strengthening, flexibility, endurance, ROM for improvements in LE, proximal hip, and core control with self care, mobility, lifting, ambulation. [x] (17767) Provided verbal/tactile cueing for activities related to improving balance, coordination, kinesthetic sense, posture, motor skill, proprioception  to assist with LE, proximal hip, and core control in self care, mobility, lifting, ambulation and eccentric single leg control. [address] and Therapeutic Activities:    [x] (83619 or 42412) Provided verbal/tactile cueing for activities related to improving balance, coordination, kinesthetic sense, posture, motor skill, proprioception and motor activation to allow for proper function of core, proximal hip and LE with self care and ADLs and functional mobility.    [x] (91682) Gait Re-education- Provided training and instruction to the patient for proper LE, core and proximal hip recruitment and positioning and eccentric body weight control with ambulation re-education including up and down stairs     Home Exercise Program:    [x] (93513) Reviewed/Progressed HEP activities related to strengthening, flexibility, endurance, ROM of core, proximal hip and LE for functional self-care, mobility, lifting and ambulation/stair navigation   [x] (37756)Reviewed/Progressed HEP activities related to improving balance, coordination, kinesthetic sense, posture, motor skill, proprioception of core, proximal hip and LE for self care, mobility, lifting, and ambulation/stair navigation      Manual Treatments:  PROM / STM / Oscillations-Mobs:  G-I, II, III, IV (PA's, Inf., Post.)  [x] (96824) Provided manual therapy to mobilize LE, proximal hip and/or LS spine soft tissue/joints for the purpose of modulating pain, promoting relaxation,  increasing ROM, reducing/eliminating soft tissue swelling/inflammation/restriction, improving soft tissue extensibility and allowing for proper ROM for normal function with self care, mobility, lifting and ambulation. Approval Dates:  CPT Code Units Approved Units Used  Date Updated:                     Charges:  Timed Code Treatment Minutes: 35   Total Treatment Minutes: 35      [] EVAL (LOW) 77993 (typically 20 minutes face-to-face)  [] EVAL (MOD) 42015 (typically 30 minutes face-to-face)  [] EVAL (HIGH) 45553 (typically 45 minutes face-to-face)  [] RE-EVAL     [x] EP(95192) x     [] Dry needle 1 or 2 Muscles (53350)  [] NMR (97193) x     [] Dry needle 3+ Muscles (78148)  [x] Manual (22987) x     [] Ultrasound (11735) x  [] TA (73945) x     [] Mech Traction (79732)  [] ES(attended) (59359)     [] ES (un) (49043):   [] Vasopump (48496) [] Ionto (55543)   [] Other:    Saji Bee stated goal: able to walk community distanced w/o AD. [] Progressing: [] Met: [] Not Met: [] Adjusted    Therapist goals for Patient:   Short Term Goals: To be achieved in: 2 weeks  1. Independent in HEP and progression per patient tolerance, in order to prevent re-injury. [] Progressing: [] Met: [] Not Met: [] Adjusted  2. Patient will have a decrease in pain to facilitate improvement in movement, function, and ADLs as indicated by Functional Deficits. [] Progressing: [] Met: [] Not Met: [] Adjusted    Long Term Goals: To be achieved in: at discharge  1. Decrease womac functional outcome score from 52% to 40% to assist with reaching prior level of function. [] Progressing: [] Met: [] Not Met: [] Adjusted  2. Patient will demonstrate increased AROM to 0-120* to allow for proper joint functioning as indicated by patients Functional Deficits. [] Progressing: [] Met: [] Not Met: [] Adjusted  3.  Patient will demonstrate an increase in Strength to good proximal hip strength and control, within 5lb HHD in LE to allow for proper functional mobility as indicated by patients Functional Deficits. [] Progressing: [] Met: [] Not Met: [] Adjusted  4. Patient will return to all functional activities without increased symptoms or restriction. [] Progressing: [] Met: [] Not Met: [] Adjusted  5. Able to ambulate stairs reciprocally w/ 1 rail or less. [] Progressing: [] Met: [] Not Met: [] Adjusted       ASSESSMENT:  Pt has s/s consistent w/ recent TKA surgery. He has significant Rom and strength and functional deficits and needs skilled PT to restore his mobility, strength, and stability back to at least his baseline. 1-30-23  ROM progressing as expected for time frame. Shantelle session well. Knee felt less aggravated today and more \"calm. \" Able to tolerate ROM work and mobs better. Held some ex's due to time spend on mobility work and adding bike today. Treatment/Activity Tolerance:  [x] Patient tolerated treatment well [] Patient limited by fatique  [] Patient limited by pain  [] Patient limited by other medical complications  [] Other:     Overall Progression Towards Functional goals/ Treatment Progress Update:  [] Patient is progressing as expected towards functional goals listed. [] Progression is slowed due to complexities/Impairments listed. [] Progression has been slowed due to co-morbidities. [x] Plan just implemented, too soon to assess goals progression <30days   [] Goals require adjustment due to lack of progress  [] Patient is not progressing as expected and requires additional follow up with physician  [] Other    Prognosis for POC: [x] Good [] Fair  [] Poor    Patient requires continued skilled intervention: [x] Yes  [] No        PLAN:   1/9: PT recommends he cont up to 2x/wk for up to 8-12 weeks depending on medical necessity and restoration of function and goals met.    [] Continue per plan of care [] Alter current plan (see comments)  [x] Plan of care initiated [] Hold pending MD visit [] Discharge    Electronically signed by: Eduardo Ba, PTA  [id number]      Note: If patient does not return for scheduled/recommended follow up visits, this note will serve as a discharge from care along with the most recent update on progress.

## 2023-02-02 ENCOUNTER — HOSPITAL ENCOUNTER (OUTPATIENT)
Dept: PHYSICAL THERAPY | Age: 63
Setting detail: THERAPIES SERIES
Discharge: HOME | End: 2023-02-02

## 2023-02-02 NOTE — FLOWSHEET NOTE
[facility] [facility], [facility]  [address] [facility] [place]  Phone: [phone]   Fax:     [phone]    Physical Therapy  Cancellation/No-show Note  Patient Name:  Albert Melendez  :  1960   Date:  2023  Cancelled visits to date: 2  No-shows to date: 0    Patient status for today's appointment patient:  [x]  Cancelled  []  Rescheduled appointment  []  No-show     Reason given by patient:  []  Patient ill  []  Conflicting appointment  []  No transportation    []  Conflict with work  []  No reason given  [x]  Other:     Comments: Requested to hold todays visit due to financial reasons. Contacted ortho to move up f/u visit to discuss CHAPITO. Pt wants to wait till after ortho visit to alter schedule next week. Informed pt that if he is planning CHAPITO, typically he would need to be in PT daily the first week, pt aware, but declined to schedule daily basis at this time and wanted to wait till after f/u w/ ortho on Monday. Phone call information:   []  Phone call made today to patient at _ time at number provided:      []  Patient answered, conversation as follows:    []  Patient did not answer, message left as follows:    []  Phone call not made today    Electronically signed by:   Nery Hu, PT Yes

## 2023-02-06 ENCOUNTER — TELEPHONE (OUTPATIENT)
Dept: ORTHOPEDIC SURGERY | Age: 63
End: 2023-02-06

## 2023-02-06 ENCOUNTER — HOSPITAL ENCOUNTER (OUTPATIENT)
Dept: PHYSICAL THERAPY | Age: 63
Setting detail: THERAPIES SERIES
Discharge: HOME | End: 2023-02-06
Payer: COMMERCIAL

## 2023-02-06 ENCOUNTER — OFFICE VISIT (OUTPATIENT)
Dept: ORTHOPEDIC SURGERY | Age: 63
End: 2023-02-06

## 2023-02-06 VITALS — RESPIRATION RATE: 16 BRPM | WEIGHT: 240 LBS | BODY MASS INDEX: 30.8 KG/M2 | HEIGHT: 74 IN

## 2023-02-06 DIAGNOSIS — Z96.652 STATUS POST TOTAL LEFT KNEE REPLACEMENT: Primary | ICD-10-CM

## 2023-02-06 PROCEDURE — 99024 POSTOP FOLLOW-UP VISIT: CPT | Performed by: ORTHOPAEDIC SURGERY

## 2023-02-06 NOTE — PROGRESS NOTES
[facility]  Office Visit    Chief Complaint: Follow-up s/p left total knee arthroplasty    HPI:  Olga Trinidad is a 60 y.o. who is here in follow-up of left total knee arthroplasty performed on December 21, 2022. He continues to work with physical therapy. For left leg. He reports pain is 2/10. He feels that his knee flexion has plateaued. Patient Active Problem List   Diagnosis    Psoriasis    Abnormal CT scan, chest       ROS:  Constitutional: denies fever, chills, weight loss  MSK: denies pain in other joints, muscle aches  Neurological: denies numbness, tingling, weakness    Exam:  Resp. rate 16, height 6' 2\" (1.88 m), weight 240 lb (108.9 kg). Appearance: sitting in exam room chair, appears to be in no acute distress, awake and alert  Resp: unlabored breathing on room air  Skin: warm, dry and intact with out erythema or significant increased temperature  Neuro: grossly intact both lower extremities. Intact sensation to light touch. Motor exam 4+ to 5/5 in all major motor groups. Left knee: Incision is healing as expected. Range of motion is  degrees. Sensation is intact light touch. There is brisk capillary refill. There is 5/5 muscle strength in all muscle groups. Imaging:  None    Assessment:  S/p left total knee arthroplasty    Plan:  He is recovering well postoperatively. He has plateaued in regards to knee flexion as well as extension. We discussed continued physical therapy versus manipulation under anesthesia for knee flexion. All risks, benefits, potential complications of the proposed procedure were explained and discussed which include but are not limited to persistent pain, stiffness, intraprocedure fracture, cardiopulmonary complications, DVT, pulmonary embolism, and side effects from anesthesia. Plan for left knee manipulation for flexion later this week.   We discussed that manipulation for extension is not helpful and he will continue to have needed to regain extension on his own. This dictation was done with Dragon dictation and may contain mechanical errors related to translation.

## 2023-02-06 NOTE — FLOWSHEET NOTE
[facility]  [address] [facility]  Phone: [phone]   Fax:     [phone]    Physical Therapy  Cancellation/No-show Note  Patient Name:  Janel Xie  :  1960   Date:  2023  Cancelled visits to date: 3  No-shows to date: 0    Patient status for today's appointment patient:  [x]  Cancelled  []  Rescheduled appointment  []  No-show     Reason given by patient:  []  Patient ill  []  Conflicting appointment  []  No transportation    []  Conflict with work  []  No reason given  []  Other:     Comments: Requested to hold todays visit due to wanting to save visits for after his CHAPITO which is scheduled for later this week. Phone call information:   []  Phone call made today to patient at _ time at number provided:      []  Patient answered, conversation as follows:    []  Patient did not answer, message left as follows:    []  Phone call not made today    Electronically signed by:   Edel Turk PT

## 2023-02-06 NOTE — PROGRESS NOTES
Isabela Lim notified pt was + MRSA in 12/2022- to advise Karla Rodríguez for DOS needs    C-diff Questionnaire:     * Admitted with diarrhea? [] YES    [x]  NO     *Prior history of C-Diff. In last 3 months? [] YES    [x]  NO     *Antibiotic use in the past 6-8 weeks? [x]  NO    []  YES      If yes, which: REASON_________________     *Prior hospitalization or nursing home in the last month? []  YES    [x]  NO     SAFETY FIRST. .call before you fall    [facility] @ 2/10/23       Surgery time____1210_    Do not eat or drink anything after 12:00 midnight prior to your surgery. This includes water chewing gum, mints and ice chips- the Day of Surgery. You may brush your teeth and gargle the morning of your surgery, but do not swallow the water     Please see your family doctor/pediatrician for a history and physical and/or questions concerning medications. Bring any test results/reports from your physicians office. If you are under the care of a heart doctor or specialist doctor, please be aware that you may be asked to them for clearance    You may be asked to stop blood thinners such as Coumadin, Plavix, Fragmin, Lovenox, etc., or any anti-inflammatories such as:  Aspirin, Ibuprofen, Advil, Naproxen prior to your surgery. We also ask that you stop any OTC medications such as fish oil, vitamin E, glucosamine, garlic, Multivitamins, COQ 10, etc.    We ask that you do not smoke 24 hours prior to surgery  We ask that you do not  drink any alcoholic beverages 24 hours prior to surgery     You must make arrangements for a responsible adult to take you home after your surgery. For your safety you will not be allowed to leave alone or drive yourself home. Your surgery will be cancelled if you do not have a ride home. Also for your safety, it is strongly suggested that someone stay with you the first 24 hours after your surgery.      A parent or legal guardian must accompany a child scheduled for surgery and plan to stay at the hospital until the child is discharged. Please do not bring other children with you. For your comfort, please wear simple loose fitting clothing to the hospital.  Please do not bring valuables. Do not wear any make-up or nail polish on your fingers or toes. For your safety, please do not wear any jewelry or body piercing's on the day of surgery. All jewelry must be removed. If you have dentures, they will be removed before going to operating room. For your convenience, we will provide you with a container. If you wear contact lenses or glasses, they will be removed, please bring a case for them. If you have a living will and a durable power of  for healthcare, please bring in a copy. As part of our patient safety program to minimize surgical site infections, we ask you to do the following:    Please notify your surgeon if you develop any illness between         now and the day of your surgery. This includes a cough, cold, fever, sore throat, nausea,         or vomiting, and diarrhea, etc.   Please notify your surgeon if you experience dizziness, shortness         of breath or blurred vision between now and the time of your surgery. Do not shave your operative site 96 hours prior to surgery. For face and neck surgery, men may use an electric razor 48 hours   prior to surgery. You may shower the night before surgery or the morning of   your surgery with an antibacterial soap. You will need to bring a photo ID and insurance card     If you use a C-pap or Bi-pap machine, please bring your machine with you to the hospital     Our goal is to provide you with excellent care, therefore, visitors will be limited to so that we may focus on providing this care for you. Please contact your surgeon office, if you have any further questions.                  [facility] phone number:  [phone] [facility] fax number:  [phone]    Please note these are generalized instructions for all surgical cases, you may be provided with more specific instructions according to your surgery.

## 2023-02-06 NOTE — TELEPHONE ENCOUNTER
Jay Hammer [id number]    Date: 02/10/23  Type of SX:  OUTPATIENT  Location: [facility]  CPT: 15093   DX Code: [id number]  SX area: L KNEE  Insurance: TAISHA BETH BS   SPOKE [address]

## 2023-02-08 ENCOUNTER — APPOINTMENT (OUTPATIENT)
Dept: PHYSICAL THERAPY | Age: 63
End: 2023-02-08
Payer: COMMERCIAL

## 2023-02-09 ENCOUNTER — ANESTHESIA EVENT (OUTPATIENT)
Dept: OPERATING ROOM | Age: 63
End: 2023-02-09
Payer: COMMERCIAL

## 2023-02-09 DIAGNOSIS — Z96.652 STATUS POST TOTAL LEFT KNEE REPLACEMENT: Primary | ICD-10-CM

## 2023-02-10 ENCOUNTER — ANESTHESIA (OUTPATIENT)
Dept: OPERATING ROOM | Age: 63
End: 2023-02-10
Payer: COMMERCIAL

## 2023-02-10 ENCOUNTER — HOSPITAL ENCOUNTER (OUTPATIENT)
Age: 63
Setting detail: OUTPATIENT SURGERY
Discharge: HOME | End: 2023-02-10
Attending: ORTHOPAEDIC SURGERY | Admitting: ORTHOPAEDIC SURGERY
Payer: COMMERCIAL

## 2023-02-10 ENCOUNTER — HOSPITAL ENCOUNTER (OUTPATIENT)
Dept: PHYSICAL THERAPY | Age: 63
Setting detail: THERAPIES SERIES
Discharge: HOME | End: 2023-02-10
Payer: COMMERCIAL

## 2023-02-10 VITALS
DIASTOLIC BLOOD PRESSURE: 77 MMHG | HEIGHT: 74 IN | BODY MASS INDEX: 29.45 KG/M2 | TEMPERATURE: 97.1 F | RESPIRATION RATE: 16 BRPM | HEART RATE: 73 BPM | OXYGEN SATURATION: 98 % | SYSTOLIC BLOOD PRESSURE: 139 MMHG | WEIGHT: 229.5 LBS

## 2023-02-10 PROCEDURE — 7100000011 HC PHASE II RECOVERY - ADDTL 15 MIN: Performed by: ORTHOPAEDIC SURGERY

## 2023-02-10 PROCEDURE — 2580000003 HC RX 258: Performed by: ANESTHESIOLOGY

## 2023-02-10 PROCEDURE — 6360000002 HC RX W HCPCS: Performed by: ANESTHESIOLOGY

## 2023-02-10 PROCEDURE — 3700000000 HC ANESTHESIA ATTENDED CARE: Performed by: ORTHOPAEDIC SURGERY

## 2023-02-10 PROCEDURE — 7100000010 HC PHASE II RECOVERY - FIRST 15 MIN: Performed by: ORTHOPAEDIC SURGERY

## 2023-02-10 PROCEDURE — 97110 THERAPEUTIC EXERCISES: CPT

## 2023-02-10 PROCEDURE — 3600000002 HC SURGERY LEVEL 2 BASE: Performed by: ORTHOPAEDIC SURGERY

## 2023-02-10 PROCEDURE — 97140 MANUAL THERAPY 1/> REGIONS: CPT

## 2023-02-10 PROCEDURE — 6370000000 HC RX 637 (ALT 250 FOR IP): Performed by: ANESTHESIOLOGY

## 2023-02-10 PROCEDURE — 7100000001 HC PACU RECOVERY - ADDTL 15 MIN: Performed by: ORTHOPAEDIC SURGERY

## 2023-02-10 PROCEDURE — 7100000000 HC PACU RECOVERY - FIRST 15 MIN: Performed by: ORTHOPAEDIC SURGERY

## 2023-02-10 PROCEDURE — 2500000003 HC RX 250 WO HCPCS: Performed by: ANESTHESIOLOGY

## 2023-02-10 RX ORDER — LIDOCAINE HYDROCHLORIDE 20 MG/ML
INJECTION, SOLUTION EPIDURAL; INFILTRATION; INTRACAUDAL; PERINEURAL PRN
Status: DISCONTINUED | OUTPATIENT
Start: 2023-02-10 | End: 2023-02-10 | Stop reason: SDUPTHER

## 2023-02-10 RX ORDER — SODIUM CHLORIDE 0.9 % (FLUSH) 0.9 %
5-40 SYRINGE (ML) INJECTION PRN
Status: DISCONTINUED | OUTPATIENT
Start: 2023-02-10 | End: 2023-02-10 | Stop reason: HOSPADM

## 2023-02-10 RX ORDER — SODIUM CHLORIDE 0.9 % (FLUSH) 0.9 %
5-40 SYRINGE (ML) INJECTION EVERY 12 HOURS SCHEDULED
Status: DISCONTINUED | OUTPATIENT
Start: 2023-02-10 | End: 2023-02-10 | Stop reason: HOSPADM

## 2023-02-10 RX ORDER — FENTANYL CITRATE 50 UG/ML
25 INJECTION, SOLUTION INTRAMUSCULAR; INTRAVENOUS EVERY 5 MIN PRN
Status: DISCONTINUED | OUTPATIENT
Start: 2023-02-10 | End: 2023-02-10 | Stop reason: HOSPADM

## 2023-02-10 RX ORDER — SODIUM CHLORIDE 9 MG/ML
INJECTION, SOLUTION INTRAVENOUS PRN
Status: DISCONTINUED | OUTPATIENT
Start: 2023-02-10 | End: 2023-02-10 | Stop reason: HOSPADM

## 2023-02-10 RX ORDER — ONDANSETRON 2 MG/ML
4 INJECTION INTRAMUSCULAR; INTRAVENOUS
Status: DISCONTINUED | OUTPATIENT
Start: 2023-02-10 | End: 2023-02-10 | Stop reason: HOSPADM

## 2023-02-10 RX ORDER — PROPOFOL 10 MG/ML
INJECTION, EMULSION INTRAVENOUS PRN
Status: DISCONTINUED | OUTPATIENT
Start: 2023-02-10 | End: 2023-02-10 | Stop reason: SDUPTHER

## 2023-02-10 RX ORDER — OXYCODONE HYDROCHLORIDE 5 MG/1
5 TABLET ORAL
Status: COMPLETED | OUTPATIENT
Start: 2023-02-10 | End: 2023-02-10

## 2023-02-10 RX ORDER — ACETAMINOPHEN 325 MG/1
650 TABLET ORAL EVERY 6 HOURS PRN
COMMUNITY

## 2023-02-10 RX ADMIN — OXYCODONE HYDROCHLORIDE 5 MG: 5 TABLET ORAL at 13:11

## 2023-02-10 RX ADMIN — SODIUM CHLORIDE: 9 INJECTION, SOLUTION INTRAVENOUS at 10:27

## 2023-02-10 RX ADMIN — PROPOFOL 40 MG: 10 INJECTION, EMULSION INTRAVENOUS at 12:07

## 2023-02-10 RX ADMIN — PROPOFOL 200 MG: 10 INJECTION, EMULSION INTRAVENOUS at 12:06

## 2023-02-10 RX ADMIN — LIDOCAINE HYDROCHLORIDE 80 MG: 20 INJECTION, SOLUTION EPIDURAL; INFILTRATION; INTRACAUDAL; PERINEURAL at 12:06

## 2023-02-10 ASSESSMENT — PAIN DESCRIPTION - FREQUENCY
FREQUENCY: CONTINUOUS

## 2023-02-10 ASSESSMENT — PAIN - FUNCTIONAL ASSESSMENT
PAIN_FUNCTIONAL_ASSESSMENT: ACTIVITIES ARE NOT PREVENTED
PAIN_FUNCTIONAL_ASSESSMENT: ACTIVITIES ARE NOT PREVENTED
PAIN_FUNCTIONAL_ASSESSMENT: 0-10
PAIN_FUNCTIONAL_ASSESSMENT: ACTIVITIES ARE NOT PREVENTED
PAIN_FUNCTIONAL_ASSESSMENT: ACTIVITIES ARE NOT PREVENTED

## 2023-02-10 ASSESSMENT — PAIN DESCRIPTION - ORIENTATION
ORIENTATION: LEFT

## 2023-02-10 ASSESSMENT — PAIN DESCRIPTION - LOCATION
LOCATION: KNEE

## 2023-02-10 ASSESSMENT — PAIN DESCRIPTION - ONSET
ONSET: ON-GOING

## 2023-02-10 ASSESSMENT — LIFESTYLE VARIABLES: SMOKING_STATUS: 0

## 2023-02-10 ASSESSMENT — PAIN SCALES - GENERAL
PAINLEVEL_OUTOF10: 3
PAINLEVEL_OUTOF10: 0
PAINLEVEL_OUTOF10: 5

## 2023-02-10 ASSESSMENT — PAIN DESCRIPTION - PAIN TYPE
TYPE: OTHER (COMMENT)
TYPE: SURGICAL PAIN

## 2023-02-10 ASSESSMENT — PAIN DESCRIPTION - DESCRIPTORS
DESCRIPTORS: SORE

## 2023-02-10 ASSESSMENT — ENCOUNTER SYMPTOMS: SHORTNESS OF BREATH: 0

## 2023-02-10 NOTE — H&P
Update History & Physical    The patient's History and Physical of February 10, 2023 was reviewed with the patient and I examined the patient. There was no change. The surgical site was confirmed by the patient and me. Plan: The risks, benefits, expected outcome, and alternative to the recommended procedure have been discussed with the patient. Patient understands and wants to proceed with the procedure.      Electronically signed by Sanjuana Fine MD on 2/10/2023 at 11:54 AM

## 2023-02-10 NOTE — FLOWSHEET NOTE
[facility], [facility]  [address] [facility]  Phone: [phone]   Fax:     [phone]      Physical Therapy Treatment Note/ Progress Report:     Date:  02/10/2023    Patient Name:  Macy Andrade    :  1960  MRN: [id number]    Pertinent Medical History:     Medical/Treatment Diagnosis Information:  Medical Diagnosis: Post-traumatic osteoarthritis of left knee [M17.32]  Treatment Diagnosis: s/p TKA, limited mobility, function, pain. Insurance/Certification information:     Physician Information:  Mateo Sorenson MD  Plan of care signed (Y/N):     Date of Patient follow up with Physician:      Progress Report: []  Yes  [x]  No     Date Range for reporting period:  Beginnin2023  Ending:      Progress report due (10 Rx/or 30 days whichever is less): #20     Recertification due (POC duration/ or 90 days whichever is less):      Visit # POC/Insurance Allowable Auth Needed   7 / 10 AIM 10 visits till 23 []Yes   []No     Latex Allergy:  [x]NO      []YES  Preferred Language for Healthcare:   [x]English       []Other:    Functional Scale:       Date assessed: at eval  Test:womac  Score:52% disability    Pain level:  2-3/10     History of Injury:  Pt here for re-eval s/p L TKA DOS 22. He is amb w/ FWW with mod limp and mod TKE deficit. W/o walker, he has severe limp and limited Wbing on affected side. He reports long hx of knee problems stemming from the  when he had ACL and meniscus surgery. He was walking with crutches just prior to surgery. He works as an  and hopes to return to full function. He did have a few weeks of home PT and that went well. He has removed his post-op dressing, and he has self applied 2 strips of tape horizontally across his incision as he was in fear of his incision busting open.  Knee is moderately swollen, but incision appears to be healing well. SUBJECTIVE:    1/12: pt reports doing ok. Trying to do HEP 3x/day and knee feels stiff. He is walking with less limp however. 1/19: pt states his knee feels stiff today. He is concerned about PT visit costs, so he inquired about reducing to 1x/wk. States he has been pushing hard as he can tolerate at home 3x/day, but pain is getting to be too much. He is considering going back to work which would be a sitting job in the next week or so, but he asked his surgeon who stated it was probably a bit too early. 1/23: pt states he has been working aggressively w/ 2 sessions a day 12 hrs apart, spending approx 30min of ROM work. He gets moderately sore from these sessions, and he does some other ex's during the middle of the day. He hope that today he has a much better ROM number as he is concerned about needing a manipulation. 1/26: knee feeling a little less bothered since backing off the Wbing stretches. 1-30-23 \"feels like a block of concrete\" Took oxycotin before PT today.  F/U with MD 2-9-23   2/10  manip this AM            OBJECTIVE:  Observation:   Test measurements:    ROM Prehab eval 1/9 1/12 1/19 1/23 1/26 1-30-23   knee R= 5* to 130*  L=10* to 130* L= 15* to 95* cold  Best: 8* to 99*  AAROM:  2 to 98*    PROM: 0 to 101* AAROM: (heel slide)   94*    PROM: 104* AAROM: 95*    PROM:   105* AAROM: 99*    PROM:  105*             PROM  0-106                                           Strength          Knee ext  At least 4-/5        Knee flex  At least 4-/5                                      TESTS/ OTHER          Circumference (superior patella)  R=44.5cm  L=48cm                                                    RESTRICTIONS/PRECAUTIONS:     Exercises/Interventions:     Therapeutic Ex (94885)   Min:  12 Reps/Resistance Notes/CUES   Nustep      Knee flexion stretch       8 min progressive moved seat forward to max of 8    3-4 min on and off on step  110 deg   AAROM LAQ 11# bilat 0-90* 2x10    L Grn TB x 12    HS curl 22# bilat 2x10    L grn TB x 12         ST hip abd 2x10 ea Min UE assist   EOB hip ext 2x10 ea         Heel/ toe raises X15                   Pt edu Reviewed HEP w/ emphasis on ROM, and restoring quad activation    Therapeutic Activity (88247) Min:      Pt edu Pt edu re: relationship re: ROM work, strength work, functional training, etc.                              NMR re-education (48439)  Min:  CUES NEEDED   QS Bilat supine 10sec x 7 L     squat RESUME    Cues for TKE   gait To Pt with st cane  Cues to avoid compensations/ limp        Manual Intervention (78691) Min:   20     Knee manual PROM supine flexion with slide board Max 110                         Modalities  Min: VASO not covered by insurance           Other Therapeutic Activities: Pt was educated on PT POC, Diagnosis, Prognosis, pathomechanics as well as frequency and duration of scheduling future physical therapy appointments. Time was also taken on this day to answer all patient questions and participation in PT. Reviewed appointment policy in detail with patient and patient verbalized understanding. Home Exercise Program: Patient was instructed in the following for HEP:       1/9: Access Code: [email or web address]  URL: [email or web address]  Date: 01/09/2023  Prepared by: Gibson Mitchell    Exercises  Supine Heel Slide with Strap - 2-3 x daily - 4-6 x weekly - 2-3 sets - 10-15 reps  Supine Quadricep Sets - 1-3 x daily - 4-6 x weekly - 2-3 sets - 10 reps  Seated Long Arc Quad - 1-3 x daily - 4-6 x weekly - 2-3 sets - 10 reps  Mini Squat with Counter Support - 2-3 x daily - 4-6 x weekly - 2-3 sets - 10-15 reps  Heel Toe Raises with Counter Support - 1-3 x daily - 4-6 x weekly - 2-3 sets - 10 reps  Standing Hip Abduction with Counter Support - 2-3 x daily - 4-6 x weekly - 2-3 sets - 10-15 reps  Prone Hip Extension on Table - 2-3 x daily - 4-6 x weekly - 2-3 sets - 10-15 reps        .  Patient verbalized/demonstrated understanding and was issued written handout. Therapeutic Exercise and NMR EXR  [x] (31959) Provided verbal/tactile cueing for activities related to strengthening, flexibility, endurance, ROM for improvements in LE, proximal hip, and core control with self care, mobility, lifting, ambulation. [x] (71613) Provided verbal/tactile cueing for activities related to improving balance, coordination, kinesthetic sense, posture, motor skill, proprioception  to assist with LE, proximal hip, and core control in self care, mobility, lifting, ambulation and eccentric single leg control. [address] and Therapeutic Activities:    [x] (79375 or 52448) Provided verbal/tactile cueing for activities related to improving balance, coordination, kinesthetic sense, posture, motor skill, proprioception and motor activation to allow for proper function of core, proximal hip and LE with self care and ADLs and functional mobility.    [x] (62423) Gait Re-education- Provided training and instruction to the patient for proper LE, core and proximal hip recruitment and positioning and eccentric body weight control with ambulation re-education including up and down stairs     Home Exercise Program:    [x] (56058) Reviewed/Progressed HEP activities related to strengthening, flexibility, endurance, ROM of core, proximal hip and LE for functional self-care, mobility, lifting and ambulation/stair navigation   [x] (20149)Reviewed/Progressed HEP activities related to improving balance, coordination, kinesthetic sense, posture, motor skill, proprioception of core, proximal hip and LE for self care, mobility, lifting, and ambulation/stair navigation      Manual Treatments:  PROM / STM / Oscillations-Mobs:  G-I, II, III, IV (PA's, Inf., Post.)  [x] (96642) Provided manual therapy to mobilize LE, proximal hip and/or LS spine soft tissue/joints for the purpose of modulating pain, promoting relaxation,  increasing ROM, reducing/eliminating soft tissue swelling/inflammation/restriction, improving soft tissue extensibility and allowing for proper ROM for normal function with self care, mobility, lifting and ambulation. Charges:  Timed Code Treatment Minutes: 32   Total Treatment Minutes: 32      [] EVAL (LOW) 80936 (typically 20 minutes face-to-face)  [] EVAL (MOD) 72728 (typically 30 minutes face-to-face)  [] EVAL (HIGH) 16469 (typically 45 minutes face-to-face)  [] RE-EVAL     [x] UC(70475) x   1  [] Dry needle 1 or 2 Muscles (97640)  [] NMR (70960) x     [] Dry needle 3+ Muscles (42600)  [x] Manual (05306) x    1 [] Ultrasound (68625) x  [] TA (00481) x     [] Mech Traction (46341)  [] ES(attended) (63983)     [] ES (un) (59301):   [] Vasopump (30832) [] Ionto (68141)   [] Other:    Isadora Almendarez stated goal: able to walk community distanced w/o AD. [] Progressing: [] Met: [] Not Met: [] Adjusted    Therapist goals for Patient:   Short Term Goals: To be achieved in: 2 weeks  1. Independent in HEP and progression per patient tolerance, in order to prevent re-injury. [] Progressing: [] Met: [] Not Met: [] Adjusted  2. Patient will have a decrease in pain to facilitate improvement in movement, function, and ADLs as indicated by Functional Deficits. [] Progressing: [] Met: [] Not Met: [] Adjusted    Long Term Goals: To be achieved in: at discharge  1. Decrease womac functional outcome score from 52% to 40% to assist with reaching prior level of function. [] Progressing: [] Met: [] Not Met: [] Adjusted  2. Patient will demonstrate increased AROM to 0-120* to allow for proper joint functioning as indicated by patients Functional Deficits. [] Progressing: [] Met: [] Not Met: [] Adjusted  3. Patient will demonstrate an increase in Strength to good proximal hip strength and control, within 5lb HHD in LE to allow for proper functional mobility as indicated by patients Functional Deficits.    [] Progressing: [] Met: [] Not Met: [] Adjusted  4. Patient will return to all functional activities without increased symptoms or restriction. [] Progressing: [] Met: [] Not Met: [] Adjusted  5. Able to ambulate stairs reciprocally w/ 1 rail or less. [] Progressing: [] Met: [] Not Met: [] Adjusted       ASSESSMENT:  Pt has s/s consistent w/ recent TKA surgery. He has significant Rom and strength and functional deficits and needs skilled PT to restore his mobility, strength, and stability back to at least his baseline. 1-30-23  ROM progressing as expected for time frame. 2/10 -  110 supine flexion       Shantelle session well. Knee felt less aggravated today and more \"calm. \" Able to tolerate ROM work and mobs better. Held some ex's due to time spend on mobility work and adding bike today. Treatment/Activity Tolerance:  [x] Patient tolerated treatment well [] Patient limited by fatique  [] Patient limited by pain  [] Patient limited by other medical complications  [] Other:     Overall Progression Towards Functional goals/ Treatment Progress Update:  [] Patient is progressing as expected towards functional goals listed. [] Progression is slowed due to complexities/Impairments listed. [] Progression has been slowed due to co-morbidities. [x] Plan just implemented, too soon to assess goals progression <30days   [] Goals require adjustment due to lack of progress  [] Patient is not progressing as expected and requires additional follow up with physician  [] Other    Prognosis for POC: [x] Good [] Fair  [] Poor    Patient requires continued skilled intervention: [x] Yes  [] No        PLAN:   1/9: PT recommends he cont up to 2x/wk for up to 8-12 weeks depending on medical necessity and restoration of function and goals met.    [] Continue per plan of care [] Alter current plan (see comments)  [x] Plan of care initiated [] Hold pending MD visit [] Discharge    Electronically signed by: Renetta Duong, PT  DPT, MS  [id number]      Note: If patient does not return for scheduled/recommended follow up visits, this note will serve as a discharge from care along with the most recent update on progress.

## 2023-02-10 NOTE — OP NOTE
Patient: Royer Kumar  YOB: 1960  MRN: [id number]    DATE OF PROCEDURE: 2/10/2023      PREOPERATIVE DIAGNOSIS:  Arthrofibrosis, status post left total knee arthroplasty. POSTOPERATIVE DIAGNOSIS:  Arthrofibrosis, status post left total knee arthroplasty. OPERATION PERFORMED:  Examination under anesthesia and manipulation of left total knee. SURGEON:  Cristo Cordova MD     ESTIMATED BLOOD LOSS:  Minimal.     INDICATIONS:  The patient is a 60 y.o. male who underwent total knee arthroplasty on 12/21/2022. He struggled with range of motion secondary to pain and now is here for manipulation and examination under anesthesia. There have been no other issues. No signs of sepsis, instability and x-rays look satisfactory. OPERATIVE PROCEDURE:  The patient was brought to the operating room. Once anesthetic was obtained, the patient's knee was manipulated from full extension to near calf-on-thigh flexion. Multiple adhesions were released as the knee was placed in the flexion movement. The knee was bent back-and-forth several times. It remained stable both in the varus-valgus and anterior-posterior planes. The patient was awoken and brought to recovery room in good condition.      Knee flexion after manipulation:

## 2023-02-10 NOTE — PROGRESS NOTES
Pt awake. VSS. Soreness 3/10 in left knee. Ice applied to left knee. Given soda and crackers and call light. Called for wife.

## 2023-02-10 NOTE — ANESTHESIA PRE PROCEDURE
Department of Anesthesiology  Preprocedure Note       Name:  Jhonathan Schroeder   Age:  60 y.o.  :  1960                                          MRN:  [id number]         Date:  2/10/2023      Surgeon: Ashley Rajput):  Sweta Wyatt MD    Procedure: Procedure(s):  LEFT KNEE MANIPULATION    Medications prior to admission:   Prior to Admission medications    Medication Sig Start Date End Date Taking?  Authorizing Provider   acetaminophen (TYLENOL) 325 MG tablet Take 650 mg by mouth every 6 hours as needed for Pain   Yes Historical Provider, MD   loratadine (CLARITIN) 10 MG tablet TAKE ONE TABLET BY MOUTH DAILY FOR 30 DOSES 10/6/22   Solorio Pastures, DO   atorvastatin (LIPITOR) 10 MG tablet Take 1 tablet by mouth in the morning. 22, DO   sildenafil (VIAGRA) 50 MG tablet Take 1 tablet by mouth as needed for Erectile Dysfunction 21, DO   clobetasol propionate 0.05 % GEL gel Apply 1 application topically as needed 18   Historical Provider, MD   ketoconazole (NIZORAL) 2 % cream Apply 1 application topically as needed 18   Historical Provider, MD       Current medications:    Current Facility-Administered Medications   Medication Dose Route Frequency Provider Last Rate Last Admin    sodium chloride flush 0.9 % injection 5-40 mL  5-40 mL IntraVENous 2 times per day Jose Alfredo MD        sodium chloride flush 0.9 % injection 5-40 mL  5-40 mL IntraVENous PRN Jose Alfredo MD        0.9 % sodium chloride infusion   IntraVENous PRN Jose Alfredo MD 10 mL/hr at 02/10/23 1027 New Bag at 02/10/23 1027       Allergies:  No Known Allergies    Problem List:    Patient Active Problem List   Diagnosis Code    Psoriasis L40.9    Abnormal CT scan, chest R93.89       Past Medical History:        Diagnosis Date    Arthritis     LEFT KNEE    Hyperlipidemia     BORDERLINE    MRSA (methicillin resistant staph aureus) culture positive     Psoriasis        Past Surgical History: Procedure Laterality Date    ANTERIOR CRUCIATE LIGAMENT REPAIR Left 1990    APPENDECTOMY      BRONCHOSCOPY  12/12/2016    COLONOSCOPY N/A 9/29/2020    COLONOSCOPY WITH BIOPSY performed by Cruzito Balderas MD at [address] [facility] Left 12/21/2022    LEFT TOTAL KNEE REPLACEMENT ROBOTIC ASSISTED performed by Arjun Martinez MD at [address] [place] [address]  12/29/2016    Dr Eliajh Morales, dilation        Social History:    Social History     Tobacco Use    Smoking status: Former     Types: Cigarettes    Smokeless tobacco: Never    Tobacco comments:     smoked 1/20 of a pack per day-[employer]=FORMER   Substance Use Topics    Alcohol use: Yes     Alcohol/week: 1.0 standard drink     Types: 1 Cans of beer per week     Comment: SOCIALLY                                Counseling given: Not Answered  Tobacco comments: smoked 1/20 of a pack per day-[employer]=FORMER      Vital Signs (Current):   Vitals:    02/06/23 1022 02/10/23 1020   BP:  130/80   Pulse:  79   Resp:  18   Temp:  97.8 °F (36.6 °C)   SpO2:  97%   Weight: 220 lb (99.8 kg) 229 lb 8 oz (104.1 kg)   Height: 6' 2\" (1.88 m) 6' 2\" (1.88 m)                                              BP Readings from Last 3 Encounters:   02/10/23 130/80   12/21/22 (!) 148/69   11/29/22 118/78       NPO Status: Time of last liquid consumption: 2330                        Time of last solid consumption: 2330                        Date of last liquid consumption: 02/09/23                        Date of last solid food consumption: 02/09/23    BMI:   Wt Readings from Last 3 Encounters:   02/10/23 229 lb 8 oz (104.1 kg)   02/06/23 240 lb (108.9 kg)   01/09/23 240 lb (108.9 kg)     Body mass index is 29.47 kg/m².     CBC:   Lab Results   Component Value Date/Time    WBC 4.5 12/12/2022 10:50 AM    RBC 4.91 12/12/2022 10:50 AM    HGB 14.4 12/12/2022 10:50 AM    HCT 43.2 12/12/2022 10:50 AM    MCV 87.9 12/12/2022 10:50 AM    RDW 14.2 12/12/2022 10:50 AM     12/12/2022 10:50 AM       CMP:   Lab Results   Component Value Date/Time     12/12/2022 10:50 AM    K 4.7 12/12/2022 10:50 AM     12/12/2022 10:50 AM    CO2 27 12/12/2022 10:50 AM    BUN 30 12/12/2022 10:50 AM    CREATININE 1.0 12/12/2022 10:50 AM    GFRAA >60 12/01/2021 11:05 AM    GFRAA >60 12/04/2011 10:30 AM    AGRATIO 2.2 12/01/2021 11:05 AM    LABGLOM >60 12/12/2022 10:50 AM    GLUCOSE 88 12/12/2022 10:50 AM    PROT 6.8 12/01/2021 11:05 AM    PROT 6.6 12/04/2011 10:30 AM    CALCIUM 9.5 12/12/2022 10:50 AM    BILITOT 0.4 12/01/2021 11:05 AM    ALKPHOS 65 12/01/2021 11:05 AM    AST 19 12/01/2021 11:05 AM    ALT 25 12/01/2021 11:05 AM       POC Tests: No results for input(s): POCGLU, POCNA, POCK, POCCL, POCBUN, POCHEMO, POCHCT in the last 72 hours. Coags:   Lab Results   Component Value Date/Time    PROTIME 13.0 12/12/2022 10:50 AM    INR 0.99 12/12/2022 10:50 AM    APTT 30.2 12/12/2022 10:50 AM       HCG (If Applicable): No results found for: PREGTESTUR, PREGSERUM, HCG, HCGQUANT     ABGs: No results found for: PHART, PO2ART, JSA9DQA, FBL6DQK, BEART, Y7THOTYG     Type & Screen (If Applicable):  No results found for: LABABO, LABRH    Drug/Infectious Status (If Applicable):  No results found for: HIV, HEPCAB    COVID-19 Screening (If Applicable):   Lab Results   Component Value Date/Time    COVID19 Not Detected 04/15/2021 09:31 AM    COVID19 DETECTED 01/12/2021 01:04 PM           Anesthesia Evaluation  Patient summary reviewed no history of anesthetic complications:   Airway: Mallampati: II  TM distance: >3 FB   Neck ROM: full  Mouth opening: > = 3 FB   Dental:      Comment: No loose teeth    Pulmonary:Negative Pulmonary ROS breath sounds clear to auscultation      (-) shortness of breath and not a current smoker          Patient did not smoke on day of surgery.                  Cardiovascular:    (+) hyperlipidemia    (-) pacemaker, past MI, CABG/stent and  angina      Rhythm: regular  Rate: normal           Beta Blocker:  Not on Beta Blocker         Neuro/Psych:   Negative Neuro/Psych ROS     (-) seizures and CVA           GI/Hepatic/Renal: Neg GI/Hepatic/Renal ROS       (-) liver disease and no renal disease       Endo/Other:    (+) : arthritis:., .    (-) diabetes mellitus, hypothyroidism, hyperthyroidism               Abdominal:             Vascular: negative vascular ROS. Other Findings:             Anesthesia Plan      general     ASA 2       Induction: intravenous. MIPS: Postoperative opioids intended. Anesthetic plan and risks discussed with patient. Plan discussed with CRNA. This pre-anesthesia assessment may be used as a history and physical.    DOS STAFF ADDENDUM:    Pt seen and examined, chart reviewed (including anesthesia, drug and allergy history). No interval changes to history and physical examination. Anesthetic plan, risks, benefits, alternatives, and personnel involved discussed with patient. Patient verbalized an understanding and agrees to proceed.       Octavio Mar MD  February 10, 2023  11:04 AM

## 2023-02-10 NOTE — ANESTHESIA POSTPROCEDURE EVALUATION
Department of Anesthesiology  Postprocedure Note    Patient: Katelyn Valerio  MRN: [id number]  YOB: 1960  Date of evaluation: 2/10/2023      Procedure Summary     Date: 02/10/23 Room / Location: [address]    Anesthesia Start: 4503 Anesthesia Stop: 1219    Procedure: LEFT KNEE MANIPULATION (Left: Knee) Diagnosis:       Arthrofibrosis of total knee replacement, sequela      (LEFT KNEE ARTHROFIBROSIS)    Surgeons: Nichole Patel MD Responsible Provider: Malou Caldwell MD    Anesthesia Type: general ASA Status: 2          Anesthesia Type: No value filed.     Chiquita Phase I: Chiquita Score: 8    Chiquita Phase II: Chiquita Score: 10      Anesthesia Post Evaluation    Patient location during evaluation: PACU  Patient participation: complete - patient participated  Level of consciousness: awake and alert  Airway patency: patent  Nausea & Vomiting: no nausea and no vomiting  Complications: no  Cardiovascular status: hemodynamically stable  Respiratory status: acceptable  Hydration status: stable

## 2023-02-10 NOTE — DISCHARGE INSTRUCTIONS
You had a left knee manipulation today (2/10/2023)  -You may put full weight on your leg.  -Continue working with physical therapy and doing a home exercise program.  -Use ice and elevate your leg to help ease pain and swelling.  -Take pain medications as directed. -Call [phone] for any concerns/questions. Follow-up in the office in 3 weeks.

## 2023-02-10 NOTE — PROGRESS NOTES
Patient admitted to PACU # 7  from OR at 1215 post left knee manipulation per Dr Rachelle Del Castillo. Attached to PACU monitoring system and report received from anesthesia provider. Patient was reported to be hemodynamically stable during procedure. Patient drowsy on admission and denied pain.

## 2023-02-11 ENCOUNTER — HOSPITAL ENCOUNTER (OUTPATIENT)
Dept: PHYSICAL THERAPY | Age: 63
Setting detail: THERAPIES SERIES
Discharge: HOME | End: 2023-02-11
Payer: COMMERCIAL

## 2023-02-11 PROCEDURE — 97140 MANUAL THERAPY 1/> REGIONS: CPT

## 2023-02-11 PROCEDURE — 97110 THERAPEUTIC EXERCISES: CPT

## 2023-02-11 NOTE — FLOWSHEET NOTE
[facility] - Outpatient Rehabilitation and Therapy, [facility]  [address] [place] [facility], [facility]  Phone: [phone]   Fax:     [phone]      Physical Therapy Treatment Note/ Progress Report:     Date:  2023    Patient Name:  Fredi Julio    :  1960  MRN: [id number]    Pertinent Medical History:     Medical/Treatment Diagnosis Information:  Medical Diagnosis: Post-traumatic osteoarthritis of left knee [M17.32]  Treatment Diagnosis: s/p TKA, limited mobility, function, pain. Insurance/Certification information:  Jose Huang through [employer]   Physician Information:  Esteban Tinajero MD  Plan of care signed (Y/N): sent for cosign (received)    Date of Patient follow up with Physician:      Progress Report: []  Yes  [x]  No     Date Range for reporting period:  Beginnin2023  Ending:      Progress report due (10 Rx/or 30 days whichever is less): #77     Recertification due (POC duration/ or 90 days whichever is less):      Visit # POC/Insurance Allowable Auth Needed   8 / 10 [employer] 10 visits till 23 []Yes   []No     Latex Allergy:  [x]NO      []YES  Preferred Language for Healthcare:   [x]English       []Other:    Functional Scale:       Date assessed: at eval  Test:womac  Score:52% disability    Pain level:  2-3/10     History of Injury:  Pt here for re-eval s/p L TKA DOS 22. He is amb w/ FWW with mod limp and mod TKE deficit. W/o walker, he has severe limp and limited Wbing on affected side. He reports long hx of knee problems stemming from the  when he had ACL and meniscus surgery. He was walking with crutches just prior to surgery. He works as an  and hopes to return to full function. He did have a few weeks of home PT and that went well. He has removed his post-op dressing, and he has self applied 2 strips of tape horizontally across his incision as he was in fear of his incision busting open. Knee is moderately swollen, but incision appears to be healing well. SUBJECTIVE:    1/12: pt reports doing ok. Trying to do HEP 3x/day and knee feels stiff. He is walking with less limp however. 1/19: pt states his knee feels stiff today. He is concerned about PT visit costs, so he inquired about reducing to 1x/wk. States he has been pushing hard as he can tolerate at home 3x/day, but pain is getting to be too much. He is considering going back to work which would be a sitting job in the next week or so, but he asked his surgeon who stated it was probably a bit too early. 1/23: pt states he has been working aggressively w/ 2 sessions a day 12 hrs apart, spending approx 30min of ROM work. He gets moderately sore from these sessions, and he does some other ex's during the middle of the day. He hope that today he has a much better ROM number as he is concerned about needing a manipulation. 1/26: knee feeling a little less bothered since backing off the Wbing stretches. 1-30-23 \"feels like a block of concrete\" Took oxycotin before PT today. F/U with MD 2-9-23   2/10  manip this AM            2/11: Has been working on bending knee at home. Notes difficulty sleeping due to pain. Pt states he has an upright bike at home he can use to ride for motion. Pt reports he has not been wearing his compression stocking.      OBJECTIVE:  Observation:   Test measurements:    ROM Prehab eval 1/9 1/12 1/19 1/23 1/26 1-30-23 2/11/23   knee R= 5* to 130*  L=10* to 130* L= 15* to 95* cold  Best: 8* to 99*  AAROM:  2 to 98*    PROM: 0 to 101* AAROM: (heel slide)   94*    PROM: 104* AAROM: 95*    PROM:   105* AAROM: 99*    PROM:  105*             PROM  0-106 AAROM3-108    PROM  0-112                                               Strength           Knee ext  At least 4-/5         Knee flex  At least 4-/5                                          TESTS/ OTHER           Circumference (superior patella)  R=44.5cm  L=48cm      Mod edema L knee ; min edema L lower leg                                                   RESTRICTIONS/PRECAUTIONS:     Exercises/Interventions:     Therapeutic Ex (02934)   Min:  25 Reps/Resistance Notes/CUES   Upright Bike      Knee flexion stretch       X8 mins      X5 mins min on and off on step  Arcs to full rev per laisha  Full rev with substitutions  110 deg   AAROM Heel slide w/ belt 15sec x 10 L    Reformer AAROM knee flexon x5 mins  PROM 108   LAQ    HS curl        ST hip abd Min UE assist   EOB hip ext        Heel/ toe raises         Long sit knee extension  2x 1 min L         Pt edu    Therapeutic Activity (63448) Min: 5     Pt edu Pt edu re: discussed getting thigh compression stocking, elevated LE and using ice frequently to assist with edema management. NMR re-education (91268)  Min:  CUES NEEDED   QS    squat RESUME    Cues for TKE   gait To Pt with st cane  Cues to avoid compensations/ limp        Manual Intervention (43746) Min:   25     Knee manual PROM supine flexion with slide board  PROM seated flexion with LLE distraction  PROM supine extension with heel prop and overpressure Max 112     Patellar mobs Grade 2 sup/inf                   Modalities  Min: 10 VASO not covered by insurance    vaso Low comp x 10'  (No charge) Leg elevated on bolster - max pressure          Other Therapeutic Activities: Pt was educated on PT POC, Diagnosis, Prognosis, pathomechanics as well as frequency and duration of scheduling future physical therapy appointments. Time was also taken on this day to answer all patient questions and participation in PT. Reviewed appointment policy in detail with patient and patient verbalized understanding. Home Exercise Program: Patient was instructed in the following for HEP:       1/9: Access Code: [email or web address]  URL: [email or web address]  Date: 01/09/2023  Prepared by: Sheyla Sotelo    Exercises  Supine Heel Slide with Strap - 2-3 x daily - 4-6 x weekly - 2-3 sets - 10-15 reps  Supine Quadricep Sets - 1-3 x daily - 4-6 x weekly - 2-3 sets - 10 reps  Seated Long Arc Quad - 1-3 x daily - 4-6 x weekly - 2-3 sets - 10 reps  Mini Squat with Counter Support - 2-3 x daily - 4-6 x weekly - 2-3 sets - 10-15 reps  Heel Toe Raises with Counter Support - 1-3 x daily - 4-6 x weekly - 2-3 sets - 10 reps  Standing Hip Abduction with Counter Support - 2-3 x daily - 4-6 x weekly - 2-3 sets - 10-15 reps  Prone Hip Extension on Table - 2-3 x daily - 4-6 x weekly - 2-3 sets - 10-15 reps        . Patient verbalized/demonstrated understanding and was issued written handout. Therapeutic Exercise and NMR EXR  [x] (66324) Provided verbal/tactile cueing for activities related to strengthening, flexibility, endurance, ROM for improvements in LE, proximal hip, and core control with self care, mobility, lifting, ambulation.  [] (48285) Provided verbal/tactile cueing for activities related to improving balance, coordination, kinesthetic sense, posture, motor skill, proprioception  to assist with LE, proximal hip, and core control in self care, mobility, lifting, ambulation and eccentric single leg control.  [address] [facility] and Therapeutic Activities:    [] (48679 or 89316) Provided verbal/tactile cueing for activities related to improving balance, coordination, kinesthetic sense, posture, motor skill, proprioception and motor activation to allow for proper function of core, proximal hip and LE with self care and ADLs and functional mobility.   [] (89248) Gait Re-education- Provided training and instruction to the patient for proper LE, core and proximal hip recruitment and positioning and eccentric body weight control with ambulation re-education including up and down stairs     Home Exercise Program:    [] (81894) Reviewed/Progressed HEP activities related to strengthening, flexibility, endurance, ROM of core, proximal hip and LE for functional self-care, mobility, lifting and ambulation/stair navigation   [] (27867)Reviewed/Progressed HEP activities related to improving balance, coordination, kinesthetic sense, posture, motor skill, proprioception of core, proximal hip and LE for self care, mobility, lifting, and ambulation/stair navigation      Manual Treatments:  PROM / STM / Oscillations-Mobs:  G-I, II, III, IV (PA's, Inf., Post.)  [x] (37114) Provided manual therapy to mobilize LE, proximal hip and/or LS spine soft tissue/joints for the purpose of modulating pain, promoting relaxation,  increasing ROM, reducing/eliminating soft tissue swelling/inflammation/restriction, improving soft tissue extensibility and allowing for proper ROM for normal function with self care, mobility, lifting and ambulation. Charges:  Timed Code Treatment Minutes: 55   Total Treatment Minutes: 65      [] EVAL (LOW) 18344 (typically 20 minutes face-to-face)  [] EVAL (MOD) 82804 (typically 30 minutes face-to-face)  [] EVAL (HIGH) 21266 (typically 45 minutes face-to-face)  [] RE-EVAL     [x] WE(09574) x   2  [] Dry needle 1 or 2 Muscles (46770)  [] NMR (31335) x     [] Dry needle 3+ Muscles (64510)  [x] Manual (53855) x  2 [] Ultrasound (54691) x  [] TA (36990) x     [] Mech Traction (36830)  [] ES(attended) (83716)     [] ES (un) (91823):   [] Vasopump (39572) [] Ionto (37671)   [] Other:    Ines Nur stated goal: able to walk community distanced w/o AD. [] Progressing: [] Met: [] Not Met: [] Adjusted    Therapist goals for Patient:   Short Term Goals: To be achieved in: 2 weeks  1. Independent in HEP and progression per patient tolerance, in order to prevent re-injury. [] Progressing: [] Met: [] Not Met: [] Adjusted  2. Patient will have a decrease in pain to facilitate improvement in movement, function, and ADLs as indicated by Functional Deficits. [] Progressing: [] Met: [] Not Met: [] Adjusted    Long Term Goals: To be achieved in: at discharge  1.  Decrease womac functional outcome score from 52% to 40% to assist with reaching prior level of function. [] Progressing: [] Met: [] Not Met: [] Adjusted  2. Patient will demonstrate increased AROM to 0-120* to allow for proper joint functioning as indicated by patients Functional Deficits. [] Progressing: [] Met: [] Not Met: [] Adjusted  3. Patient will demonstrate an increase in Strength to good proximal hip strength and control, within 5lb HHD in LE to allow for proper functional mobility as indicated by patients Functional Deficits. [] Progressing: [] Met: [] Not Met: [] Adjusted  4. Patient will return to all functional activities without increased symptoms or restriction. [] Progressing: [] Met: [] Not Met: [] Adjusted  5. Able to ambulate stairs reciprocally w/ 1 rail or less. [] Progressing: [] Met: [] Not Met: [] Adjusted       ASSESSMENT:  2/11: Pt demonstrates significant improvement with Knee mobility following CHAPITO yesterday. Able to achieve PROM today of 0-112*. Moderate edema present in L knee and mild edema throughout L lower leg which are contributing to some of his tightness feeling in the knee. Reviewed with patient getting thigh compression stocking and elevating/ice knee frequently throughout the day for improved edema management. Pt will continue to benefit from skilled therapy to address mobility deficits and functional strength. Treatment/Activity Tolerance:  [x] Patient tolerated treatment well [] Patient limited by fatique  [] Patient limited by pain  [] Patient limited by other medical complications  [] Other:     Overall Progression Towards Functional goals/ Treatment Progress Update:  [] Patient is progressing as expected towards functional goals listed. [] Progression is slowed due to complexities/Impairments listed. [] Progression has been slowed due to co-morbidities.   [x] Plan just implemented, too soon to assess goals progression <30days   [] Goals require adjustment due to lack of progress  [] Patient is not progressing as expected and requires additional follow up with physician  [] Other    Prognosis for POC: [x] Good [] Fair  [] Poor    Patient requires continued skilled intervention: [x] Yes  [] No        PLAN:   PT recommends he cont up to 2x/wk for up to 8-12 weeks depending on medical necessity and restoration of function and goals met. [x] Continue per plan of care [] Alter current plan (see comments)  [] Plan of care initiated [] Hold pending MD visit [] Discharge    Electronically signed by: Gulshan Beckham, PT , OMT-C,  [id number]      Note: If patient does not return for scheduled/recommended follow up visits, this note will serve as a discharge from care along with the most recent update on progress.

## 2023-02-13 ENCOUNTER — HOSPITAL ENCOUNTER (OUTPATIENT)
Dept: PHYSICAL THERAPY | Age: 63
Setting detail: THERAPIES SERIES
Discharge: HOME | End: 2023-02-13
Payer: COMMERCIAL

## 2023-02-13 PROCEDURE — 97110 THERAPEUTIC EXERCISES: CPT

## 2023-02-13 PROCEDURE — 97140 MANUAL THERAPY 1/> REGIONS: CPT

## 2023-02-13 NOTE — FLOWSHEET NOTE
[facility] - Outpatient Rehabilitation and Therapy, [facility]  [address] [facility], [facility]  Phone: [phone]   Fax:     [phone]      Physical Therapy Treatment Note/ Progress Report:     Date:  2023    Patient Name:  Liz Dean    :  1960  MRN: [id number]    Pertinent Medical History:     Medical/Treatment Diagnosis Information:  Medical Diagnosis: Post-traumatic osteoarthritis of left knee [M17.32]  Treatment Diagnosis: s/p TKA, limited mobility, function, pain. Insurance/Certification information:  Martin Hdz through [employer]   Physician Information:  Susan Lofton MD  Plan of care signed (Y/N): sent for cosign (received)    Date of Patient follow up with Physician:      Progress Report: []  Yes  [x]  No     Date Range for reporting period:  Beginnin2023  Ending:      Progress report due (10 Rx/or 30 days whichever is less): #78     Recertification due (POC duration/ or 90 days whichever is less):      Visit # POC/Insurance Allowable Auth Needed   3 / 10 [employer] 10 visits till 23 []Yes   []No     Latex Allergy:  [x]NO      []YES  Preferred Language for Healthcare:   [x]English       []Other:    Functional Scale:       Date assessed: at eval  Test:womac  Score:52% disability    Pain level:  3/10     History of Injury:  Pt here for re-eval s/p L TKA DOS 22. He is amb w/ FWW with mod limp and mod TKE deficit. W/o walker, he has severe limp and limited Wbing on affected side. He reports long hx of knee problems stemming from the  when he had ACL and meniscus surgery. He was walking with crutches just prior to surgery. He works as an  and hopes to return to full function. He did have a few weeks of home PT and that went well. He has removed his post-op dressing, and he has self applied 2 strips of tape horizontally across his incision as he was in fear of his incision busting open.  Knee is moderately swollen, but incision appears to be healing well. SUBJECTIVE:    1/12: pt reports doing ok. Trying to do HEP 3x/day and knee feels stiff. He is walking with less limp however. 1/19: pt states his knee feels stiff today. He is concerned about PT visit costs, so he inquired about reducing to 1x/wk. States he has been pushing hard as he can tolerate at home 3x/day, but pain is getting to be too much. He is considering going back to work which would be a sitting job in the next week or so, but he asked his surgeon who stated it was probably a bit too early. 1/23: pt states he has been working aggressively w/ 2 sessions a day 12 hrs apart, spending approx 30min of ROM work. He gets moderately sore from these sessions, and he does some other ex's during the middle of the day. He hope that today he has a much better ROM number as he is concerned about needing a manipulation. 1/26: knee feeling a little less bothered since backing off the Wbing stretches. 1-30-23 \"feels like a block of concrete\" Took oxycotin before PT today. F/U with MD 2-9-23   2/10  manip this AM             2/11: Has been working on bending knee at home. Notes difficulty sleeping due to pain. Pt states he has an upright bike at home he can use to ride for motion. Pt reports he has not been wearing his compression stocking. 2-13-23 just came from work. To PT with rolling walker, just took compression hose off.              OBJECTIVE:  Observation:   Test measurements:    ROM Prehab eval 1/9 1/12 1/19 1/23 1/26 1-30-23 2/11/23   knee R= 5* to 130*  L=10* to 130* L= 15* to 95* cold  Best: 8* to 99*  AAROM:  2 to 98*    PROM: 0 to 101* AAROM: (heel slide)   94*    PROM: 104* AAROM: 95*    PROM:   105* AAROM: 99*    PROM:  105*             PROM  0-106 AAROM3-108    PROM  0-112                                               Strength           Knee ext  At least 4-/5         Knee flex  At least 4-/5 TESTS/ OTHER           Circumference (superior patella)  R=44.5cm  L=48cm      Mod edema L knee ; min edema L lower leg                                                   RESTRICTIONS/PRECAUTIONS:     Exercises/Interventions:     Therapeutic Ex (90945)   Min:  25 Reps/Resistance Notes/CUES   Upright Bike   # 9       Knee flexion stretch       X 8 mins      X5 mins min on and off on step  Arcs to full rev per laisha  Full rev fwds/ backwards     AAROM Heel slide w/ belt 15sec x 10 L    Reformer AAROM knee flexon , 1 red spring x 5 mins     LAQ    HS curl        ST hip abd Min UE assist   EOB hip ext        Heel/ toe raises         Long sit knee extension  2 x 1 min L         Pt edu    Therapeutic Activity (55945) Min: 1     Pt edu Pt edu re: discussed getting thigh compression stocking, elevated LE and using ice frequently to assist with edema management. Recommended thigh high , pt wearing knee high from calf to thigh. NMR re-education (89149)  Min:  CUES NEEDED   QS    squat RESUME    Cues for TKE   gait To Pt with st cane  Cues to avoid compensations/ limp        Manual Intervention (86797) Min:   15     Knee manual PROM supine flexion with slide board  PROM seated flexion with LLE distraction  PROM supine extension with heel prop and overpressure  PROM supine Max 115     Patellar mobs Grade 2 sup/inf                   Modalities  Min: 10 VASO not covered by insurance    Vaso / gameready Low comp x 10'  (No charge) Leg  straight          Other Therapeutic Activities: Pt was educated on PT POC, Diagnosis, Prognosis, pathomechanics as well as frequency and duration of scheduling future physical therapy appointments. Time was also taken on this day to answer all patient questions and participation in PT. Reviewed appointment policy in detail with patient and patient verbalized understanding. Home Exercise Program: Patient was instructed in the following for HEP:       1/9:  Access Code: [email or web address]  URL: [email or web address]  Date: 01/09/2023  Prepared by: Mo Wood    Exercises  Supine Heel Slide with Strap - 2-3 x daily - 4-6 x weekly - 2-3 sets - 10-15 reps  Supine Quadricep Sets - 1-3 x daily - 4-6 x weekly - 2-3 sets - 10 reps  Seated Long Arc Quad - 1-3 x daily - 4-6 x weekly - 2-3 sets - 10 reps  Mini Squat with Counter Support - 2-3 x daily - 4-6 x weekly - 2-3 sets - 10-15 reps  Heel Toe Raises with Counter Support - 1-3 x daily - 4-6 x weekly - 2-3 sets - 10 reps  Standing Hip Abduction with Counter Support - 2-3 x daily - 4-6 x weekly - 2-3 sets - 10-15 reps  Prone Hip Extension on Table - 2-3 x daily - 4-6 x weekly - 2-3 sets - 10-15 reps        . Patient verbalized/demonstrated understanding and was issued written handout. Therapeutic Exercise and NMR EXR  [x] (45484) Provided verbal/tactile cueing for activities related to strengthening, flexibility, endurance, ROM for improvements in LE, proximal hip, and core control with self care, mobility, lifting, ambulation.  [] (19297) Provided verbal/tactile cueing for activities related to improving balance, coordination, kinesthetic sense, posture, motor skill, proprioception  to assist with LE, proximal hip, and core control in self care, mobility, lifting, ambulation and eccentric single leg control.  [facility] The[facility] Activities:    [] (58009 or 39885) Provided verbal/tactile cueing for activities related to improving balance, coordination, kinesthetic sense, posture, motor skill, proprioception and motor activation to allow for proper function of core, proximal hip and LE with self care and ADLs and functional mobility.   [] (62691) Gait Re-education- Provided training and instruction to the patient for proper LE, core and proximal hip recruitment and positioning and eccentric body weight control with ambulation re-education including up and down stairs     Home Exercise Program:    [] (19188) Reviewed/Progressed HEP activities related to strengthening, flexibility, endurance, ROM of core, proximal hip and LE for functional self-care, mobility, lifting and ambulation/stair navigation   [] (09007)Reviewed/Progressed HEP activities related to improving balance, coordination, kinesthetic sense, posture, motor skill, proprioception of core, proximal hip and LE for self care, mobility, lifting, and ambulation/stair navigation      Manual Treatments:  PROM / STM / Oscillations-Mobs:  G-I, II, III, IV (PA's, Inf., Post.)  [x] (67766) Provided manual therapy to mobilize LE, proximal hip and/or LS spine soft tissue/joints for the purpose of modulating pain, promoting relaxation,  increasing ROM, reducing/eliminating soft tissue swelling/inflammation/restriction, improving soft tissue extensibility and allowing for proper ROM for normal function with self care, mobility, lifting and ambulation. Charges:  Timed Code Treatment Minutes: 40   Total Treatment Minutes: 50      [] EVAL (LOW) 07676 (typically 20 minutes face-to-face)  [] EVAL (MOD) 85833 (typically 30 minutes face-to-face)  [] EVAL (HIGH) 48351 (typically 45 minutes face-to-face)  [] RE-EVAL     [x] QB(77309) x   2  [] Dry needle 1 or 2 Muscles (86332)  [] NMR (02407) x     [] Dry needle 3+ Muscles (96828)  [x] Manual (37218) x  1 [] Ultrasound (12612) x  [] TA (27078) x     [] Mech Traction (59865)  [] ES(attended) (37789)     [] ES (un) (54803):   [] Vasopump (23354) [] Ionto (21898)   [] Other:    Oral Kaiser stated goal: able to walk community distanced w/o AD. [] Progressing: [] Met: [] Not Met: [] Adjusted    Therapist goals for Patient:   Short Term Goals: To be achieved in: 2 weeks  1. Independent in HEP and progression per patient tolerance, in order to prevent re-injury. [] Progressing: [] Met: [] Not Met: [] Adjusted  2.  Patient will have a decrease in pain to facilitate improvement in movement, function, and ADLs as indicated by Functional Deficits. [] Progressing: [] Met: [] Not Met: [] Adjusted    Long Term Goals: To be achieved in: at discharge  1. Decrease womac functional outcome score from 52% to 40% to assist with reaching prior level of function. [] Progressing: [] Met: [] Not Met: [] Adjusted  2. Patient will demonstrate increased AROM to 0-120* to allow for proper joint functioning as indicated by patients Functional Deficits. [] Progressing: [] Met: [] Not Met: [] Adjusted  3. Patient will demonstrate an increase in Strength to good proximal hip strength and control, within 5lb HHD in LE to allow for proper functional mobility as indicated by patients Functional Deficits. [] Progressing: [] Met: [] Not Met: [] Adjusted  4. Patient will return to all functional activities without increased symptoms or restriction. [] Progressing: [] Met: [] Not Met: [] Adjusted  5. Able to ambulate stairs reciprocally w/ 1 rail or less. [] Progressing: [] Met: [] Not Met: [] Adjusted       ASSESSMENT:  2/11: Pt demonstrates significant improvement with Knee mobility following CHAPITO yesterday. Able to achieve PROM today of 0-112*. Moderate edema present in L knee and mild edema throughout L lower leg which are contributing to some of his tightness feeling in the knee. Reviewed with patient getting thigh compression stocking and elevating/ice knee frequently throughout the day for improved edema management. Pt will continue to benefit from skilled therapy to address mobility deficits and functional strength. 2-13-23  able to do full revolutions backwards/fwds on bike with some substitutions initially. Recommended getting thgih high compression stockings vs using knee highs up over knee.      Treatment/Activity Tolerance:  [x] Patient tolerated treatment well [] Patient limited by fatique  [] Patient limited by pain  [] Patient limited by other medical complications  [] Other:     Overall Progression Towards Functional goals/ Treatment Progress Update:  [] Patient is progressing as expected towards functional goals listed. [] Progression is slowed due to complexities/Impairments listed. [] Progression has been slowed due to co-morbidities. [x] Plan just implemented, too soon to assess goals progression <30days   [] Goals require adjustment due to lack of progress  [] Patient is not progressing as expected and requires additional follow up with physician  [] Other    Prognosis for POC: [x] Good [] Fair  [] Poor    Patient requires continued skilled intervention: [x] Yes  [] No        PLAN:   PT recommends he cont up to 2x/wk for up to 8-12 weeks depending on medical necessity and restoration of function and goals met. [x] Continue per plan of care [] Alter current plan (see comments)  [] Plan of care initiated [] Hold pending MD visit [] Discharge    Electronically signed by: Linda Bansal,       Note: If patient does not return for scheduled/recommended follow up visits, this note will serve as a discharge from care along with the most recent update on progress.

## 2023-02-14 ENCOUNTER — HOSPITAL ENCOUNTER (OUTPATIENT)
Dept: PHYSICAL THERAPY | Age: 63
Setting detail: THERAPIES SERIES
Discharge: HOME | End: 2023-02-14
Payer: COMMERCIAL

## 2023-02-14 PROCEDURE — 97140 MANUAL THERAPY 1/> REGIONS: CPT

## 2023-02-14 PROCEDURE — 97110 THERAPEUTIC EXERCISES: CPT

## 2023-02-14 NOTE — FLOWSHEET NOTE
[facility] - Outpatient Rehabilitation and Therapy, [facility]  [address] [place] [facility], [facility]  Phone: [phone]   Fax:     [phone]      Physical Therapy Treatment Note/ Progress Report:     Date:  2023    Patient Name:  Van Bernheim    :  1960  MRN: [id number]    Pertinent Medical History:     Medical/Treatment Diagnosis Information:  Medical Diagnosis: Post-traumatic osteoarthritis of left knee [M17.32]  Treatment Diagnosis: s/p TKA, limited mobility, function, pain. Insurance/Certification information:  Tania Finger through [employer]   Physician Information:  Lito Mark MD  Plan of care signed (Y/N): sent for cosign (received)    Date of Patient follow up with Physician:      Progress Report: []  Yes  [x]  No     Date Range for reporting period:  Beginnin2023  Ending:      Progress report due (10 Rx/or 30 days whichever is less): #91     Recertification due (POC duration/ or 90 days whichever is less):      Visit # POC/Insurance Allowable Auth Needed   10 / 16 AIM 10  - 23  6 - [x]Yes   []No     Latex Allergy:  [x]NO      []YES  Preferred Language for Healthcare:   [x]English       []Other:    Functional Scale:       Date assessed: at eval  Test:womac  Score:52% disability    Pain level:  3/10     History of Injury:  Pt here for re-eval s/p L TKA DOS 22. He is amb w/ FWW with mod limp and mod TKE deficit. W/o walker, he has severe limp and limited Wbing on affected side. He reports long hx of knee problems stemming from the  when he had ACL and meniscus surgery. He was walking with crutches just prior to surgery. He works as an  and hopes to return to full function. He did have a few weeks of home PT and that went well. He has removed his post-op dressing, and he has self applied 2 strips of tape horizontally across his incision as he was in fear of his incision busting open. Knee is moderately swollen, but incision appears to be healing well. SUBJECTIVE:    1/12: pt reports doing ok. Trying to do HEP 3x/day and knee feels stiff. He is walking with less limp however. 1/19: pt states his knee feels stiff today. He is concerned about PT visit costs, so he inquired about reducing to 1x/wk. States he has been pushing hard as he can tolerate at home 3x/day, but pain is getting to be too much. He is considering going back to work which would be a sitting job in the next week or so, but he asked his surgeon who stated it was probably a bit too early. 1/23: pt states he has been working aggressively w/ 2 sessions a day 12 hrs apart, spending approx 30min of ROM work. He gets moderately sore from these sessions, and he does some other ex's during the middle of the day. He hope that today he has a much better ROM number as he is concerned about needing a manipulation. 1/26: knee feeling a little less bothered since backing off the Wbing stretches. 1-30-23 \"feels like a block of concrete\" Took oxycotin before PT today. F/U with MD 2-9-23   2/10  manip this AM             2/11: Has been working on bending knee at home. Notes difficulty sleeping due to pain. Pt states he has an upright bike at home he can use to ride for motion. Pt reports he has not been wearing his compression stocking. 2-13-23 just came from work. To PT with rolling walker, just took compression hose off.   2/14 - working hard on ROM at home.  Feel that he is doing better      OBJECTIVE:  Observation:   Test measurements:    ROM Prehab eval 1/9 1/12 1/19 1/23 1/26 1-30-23 2/11/23   knee R= 5* to 130*  L=10* to 130* L= 15* to 95* cold  Best: 8* to 99*  AAROM:  2 to 98*    PROM: 0 to 101* AAROM: (heel slide)   94*    PROM: 104* AAROM: 95*    PROM:   105* AAROM: 99*    PROM:  105*             PROM  0-106 AAROM3-108    PROM  0-112                                               Strength           Knee ext  At least 4-/5 Knee flex  At least 4-/5                                          TESTS/ OTHER           Circumference (superior patella)  R=44.5cm  L=48cm      Mod edema L knee ; min edema L lower leg                                                   RESTRICTIONS/PRECAUTIONS:     Exercises/Interventions:     Therapeutic Ex (94632)   Min: 30 Reps/Resistance Notes/CUES   Upright Bike   # 9   Nustep 9 to 7 seat      Knee flexion stretch       X 8 mins  6 min      X5 mins min on and off on step  Arcs to full rev per laisha  Full rev fwds/ backwards     AAROM Heel slide w/ belt 15sec x 10 L    Reformer AAROM knee flexon , 1 red spring x 5 mins     LAQ    Leg Press seat 8 x 10                    Seat 9 x 10 11# 2 x 10 Left   30# 2 x 10     Heels at base of platform   HS curl     11# 2 x 10 Left         Therapeutic Activity (44105) Min: 1                              NMR re-education (78348)  Min:  CUES NEEDED        Manual Intervention (52418) Min:  25     Knee manual PROM supine flexion with slide board  PROM seated flexion with LLE distraction  PROM supine extension with heel prop and overpressure  Prone knee flexion   PROM supine Max 115     Patellar mobs Grade 2 sup/inf                   Modalities  Min: 10 VASO not covered by insurance           Other Therapeutic Activities: Pt was educated on PT POC, Diagnosis, Prognosis, pathomechanics as well as frequency and duration of scheduling future physical therapy appointments. Time was also taken on this day to answer all patient questions and participation in PT. Reviewed appointment policy in detail with patient and patient verbalized understanding. Home Exercise Program: Patient was instructed in the following for HEP:       1/9: Access Code: [email or web address]  URL: [email or web address]  Date: 01/09/2023  Prepared by: Mo Wood    Exercises  Supine Heel Slide with Strap - 2-3 x daily - 4-6 x weekly - 2-3 sets - 10-15 reps  Supine Quadricep Sets - 1-3 x daily - 4-6 x weekly - 2-3 sets - 10 reps  Seated Long Arc Quad - 1-3 x daily - 4-6 x weekly - 2-3 sets - 10 reps  Mini Squat with Counter Support - 2-3 x daily - 4-6 x weekly - 2-3 sets - 10-15 reps  Heel Toe Raises with Counter Support - 1-3 x daily - 4-6 x weekly - 2-3 sets - 10 reps  Standing Hip Abduction with Counter Support - 2-3 x daily - 4-6 x weekly - 2-3 sets - 10-15 reps  Prone Hip Extension on Table - 2-3 x daily - 4-6 x weekly - 2-3 sets - 10-15 reps        . Patient verbalized/demonstrated understanding and was issued written handout. Therapeutic Exercise and NMR EXR  [x] (94533) Provided verbal/tactile cueing for activities related to strengthening, flexibility, endurance, ROM for improvements in LE, proximal hip, and core control with self care, mobility, lifting, ambulation.  [] (02488) Provided verbal/tactile cueing for activities related to improving balance, coordination, kinesthetic sense, posture, motor skill, proprioception  to assist with LE, proximal hip, and core control in self care, mobility, lifting, ambulation and eccentric single leg control.  [address] and Therapeutic Activities:    [] (54654 or 97418) Provided verbal/tactile cueing for activities related to improving balance, coordination, kinesthetic sense, posture, motor skill, proprioception and motor activation to allow for proper function of core, proximal hip and LE with self care and ADLs and functional mobility.   [] (99263) Gait Re-education- Provided training and instruction to the patient for proper LE, core and proximal hip recruitment and positioning and eccentric body weight control with ambulation re-education including up and down stairs     Home Exercise Program:    [] (30410) Reviewed/Progressed HEP activities related to strengthening, flexibility, endurance, ROM of core, proximal hip and LE for functional self-care, mobility, lifting and ambulation/stair navigation   [] (79542)Reviewed/Progressed HEP activities related to improving balance, coordination, kinesthetic sense, posture, motor skill, proprioception of core, proximal hip and LE for self care, mobility, lifting, and ambulation/stair navigation      Manual Treatments:  PROM / STM / Oscillations-Mobs:  G-I, II, III, IV (PA's, Inf., Post.)  [x] (86419) Provided manual therapy to mobilize LE, proximal hip and/or LS spine soft tissue/joints for the purpose of modulating pain, promoting relaxation,  increasing ROM, reducing/eliminating soft tissue swelling/inflammation/restriction, improving soft tissue extensibility and allowing for proper ROM for normal function with self care, mobility, lifting and ambulation. Charges:  Timed Code Treatment Minutes: 55   Total Treatment Minutes: 55      [] EVAL (LOW) 07197 (typically 20 minutes face-to-face)  [] EVAL (MOD) 07438 (typically 30 minutes face-to-face)  [] EVAL (HIGH) 01443 (typically 45 minutes face-to-face)  [] RE-EVAL     [x] UP(44593) x   2  [] Dry needle 1 or 2 Muscles (65207)  [] NMR (88270) x     [] Dry needle 3+ Muscles (07916)  [x] Manual (67584) x  2 [] Ultrasound (25999) x  [] TA (47246) x     [] Mech Traction (37136)  [] ES(attended) (72253)     [] ES (un) (94586):   [] Vasopump (43298) [] Ionto (98266)   [] Other:    Jonas Robles stated goal: able to walk community distanced w/o AD. [] Progressing: [] Met: [] Not Met: [] Adjusted    Therapist goals for Patient:   Short Term Goals: To be achieved in: 2 weeks  1. Independent in HEP and progression per patient tolerance, in order to prevent re-injury. [] Progressing: [] Met: [] Not Met: [] Adjusted  2. Patient will have a decrease in pain to facilitate improvement in movement, function, and ADLs as indicated by Functional Deficits. [] Progressing: [] Met: [] Not Met: [] Adjusted    Long Term Goals: To be achieved in: at discharge  1. Decrease womac functional outcome score from 52% to 40% to assist with reaching prior level of function.    [] Progressing: [] Met: [] Not Met: [] Adjusted  2. Patient will demonstrate increased AROM to 0-120* to allow for proper joint functioning as indicated by patients Functional Deficits. [] Progressing: [] Met: [] Not Met: [] Adjusted  3. Patient will demonstrate an increase in Strength to good proximal hip strength and control, within 5lb HHD in LE to allow for proper functional mobility as indicated by patients Functional Deficits. [] Progressing: [] Met: [] Not Met: [] Adjusted  4. Patient will return to all functional activities without increased symptoms or restriction. [] Progressing: [] Met: [] Not Met: [] Adjusted  5. Able to ambulate stairs reciprocally w/ 1 rail or less. [] Progressing: [] Met: [] Not Met: [] Adjusted       ASSESSMENT:  2/11: Pt demonstrates significant improvement with Knee mobility following CHAPITO yesterday. Able to achieve PROM today of 0-112*. Moderate edema present in L knee and mild edema throughout L lower leg which are contributing to some of his tightness feeling in the knee. Reviewed with patient getting thigh compression stocking and elevating/ice knee frequently throughout the day for improved edema management. Pt will continue to benefit from skilled therapy to address mobility deficits and functional strength. 2-13-23  able to do full revolutions backwards/fwds on bike with some substitutions initially. Recommended getting thgih high compression stockings vs using knee highs up over knee. Treatment/Activity Tolerance:  [x] Patient tolerated treatment well [] Patient limited by fatique  [] Patient limited by pain  [] Patient limited by other medical complications  [] Other:     Overall Progression Towards Functional goals/ Treatment Progress Update:  [] Patient is progressing as expected towards functional goals listed. [] Progression is slowed due to complexities/Impairments listed. [] Progression has been slowed due to co-morbidities.   [x] Plan just implemented, too soon to assess goals progression <30days   [] Goals require adjustment due to lack of progress  [] Patient is not progressing as expected and requires additional follow up with physician  [] Other    Prognosis for POC: [x] Good [] Fair  [] Poor    Patient requires continued skilled intervention: [x] Yes  [] No        PLAN:   PT recommends he cont up to 2x/wk for up to 8-12 weeks depending on medical necessity and restoration of function and goals met. [x] Continue per plan of care [] Alter current plan (see comments)  [] Plan of care initiated [] Hold pending MD visit [] Discharge    Electronically signed by: Megan Castro, PT DPT, MS  [id number]      Note: If patient does not return for scheduled/recommended follow up visits, this note will serve as a discharge from care along with the most recent update on progress.

## 2023-02-15 ENCOUNTER — APPOINTMENT (OUTPATIENT)
Dept: PHYSICAL THERAPY | Age: 63
End: 2023-02-15
Payer: COMMERCIAL

## 2023-02-16 ENCOUNTER — HOSPITAL ENCOUNTER (OUTPATIENT)
Dept: PHYSICAL THERAPY | Age: 63
Setting detail: THERAPIES SERIES
Discharge: HOME | End: 2023-02-16
Payer: COMMERCIAL

## 2023-02-16 PROCEDURE — 97140 MANUAL THERAPY 1/> REGIONS: CPT

## 2023-02-16 PROCEDURE — 97110 THERAPEUTIC EXERCISES: CPT

## 2023-02-16 NOTE — FLOWSHEET NOTE
[facility] - Outpatient Rehabilitation and Therapy, [facility]  [address] [facility], [facility]  Phone: [phone]   Fax:     [phone]      Physical Therapy Treatment Note/ Progress Report:     Date:  2023    Patient Name:  Kianna Monroy    :  1960  MRN: [id number]    Pertinent Medical History:     Medical/Treatment Diagnosis Information:  Medical Diagnosis: Post-traumatic osteoarthritis of left knee [M17.32]  Treatment Diagnosis: s/p TKA, limited mobility, function, pain. Insurance/Certification information:  [employer] through [employer]   Physician Information:  Judah Stuart MD  Plan of care signed (Y/N): sent for cosign (received)    Date of Patient follow up with Physician:      Progress Report: []  Yes  [x]  No     Date Range for reporting period:  Beginnin2023  Ending:      Progress report due (10 Rx/or 30 days whichever is less): #18     Recertification due (POC duration/ or 90 days whichever is less):      Visit # POC/Insurance Allowable Auth Needed    [employer] 10  - 23  6 - [x]Yes   []No     Latex Allergy:  [x]NO      []YES  Preferred Language for Healthcare:   [x]English       []Other:    Functional Scale:       Date assessed: at eval  Test:womac  Score:52% disability    Pain level:  /10     History of Injury:  Pt here for re-eval s/p L TKA DOS 22. He is amb w/ FWW with mod limp and mod TKE deficit. W/o walker, he has severe limp and limited Wbing on affected side. He reports long hx of knee problems stemming from the  when he had ACL and meniscus surgery. He was walking with crutches just prior to surgery. He works as an  and hopes to return to full function. He did have a few weeks of home PT and that went well. He has removed his post-op dressing, and he has self applied 2 strips of tape horizontally across his incision as he was in fear of his incision busting open. Knee is moderately swollen, but incision appears to be healing well. SUBJECTIVE:    1/12: pt reports doing ok. Trying to do HEP 3x/day and knee feels stiff. He is walking with less limp however. 1/19: pt states his knee feels stiff today. He is concerned about PT visit costs, so he inquired about reducing to 1x/wk. States he has been pushing hard as he can tolerate at home 3x/day, but pain is getting to be too much. He is considering going back to work which would be a sitting job in the next week or so, but he asked his surgeon who stated it was probably a bit too early. 1/23: pt states he has been working aggressively w/ 2 sessions a day 12 hrs apart, spending approx 30min of ROM work. He gets moderately sore from these sessions, and he does some other ex's during the middle of the day. He hope that today he has a much better ROM number as he is concerned about needing a manipulation. 1/26: knee feeling a little less bothered since backing off the Wbing stretches. 1-30-23 \"feels like a block of concrete\" Took oxycotin before PT today. F/U with MD 2-9-23   2/10  manip this AM         2/11: Has been working on bending knee at home. Notes difficulty sleeping due to pain. Pt states he has an upright bike at home he can use to ride for motion. Pt reports he has not been wearing his compression stocking. 2-13-23 just came from work. To PT with rolling walker, just took compression hose off.   2/14 - working hard on ROM at home. Feel that he is doing better  2/16: pt reports knee feels looser since CHAPITO. He is back to work, and also working on ROM on his own. He did purchase compression garment from local pharmacy which he feels is helping swelling.        OBJECTIVE:  Observation:   Test measurements:    ROM Prehab eval 1/9 1/12 1/19 1/23 1/26 1-30-23 2/11/23 2/16   knee R= 5* to 130*  L=10* to 130* L= 15* to 95* cold  Best: 8* to 99*  AAROM:  2 to 98*    PROM: 0 to 101* AAROM: (heel slide)   94*    PROM: 104* AAROM: 95*    PROM:   105* AAROM: 99*    PROM:  105*             PROM  0-106 AAROM3-108    PROM  0-112 AROM (after bike)  Azlrnqr=532*    PROM: 113*                                                   Strength            Knee ext  At least 4-/5          Knee flex  At least 4-/5                                              TESTS/ OTHER            Circumference (superior patella)  R=44.5cm  L=48cm      Mod edema L knee ; min edema L lower leg                                                        RESTRICTIONS/PRECAUTIONS:     Exercises/Interventions:     Therapeutic Ex (96268)   Min: 30 Reps/Resistance Notes/CUES   Upright Bike   # 9   Nustep 9 to 7 seat      Knee flexion stretch       X 8 mins      Arcs to full rev per laisha  Full rev fwds/ backwards     AAROM Heel slide w/ belt 15sec x 10 L     LAQ 11# 2 x 10 Left     Leg Press  Reformer 2 springs 20sec x 10    HS curl 11# 2 x 10 Left        resume   Step down Lat 6\" x15    >add FWD 6\" Chair squat Full depth x 15   lunge >retro, walking? Goal: knee to floor       Pt edu Reviewed HEP: ROM focus, gradually progress functional quad/LE strength Therapeutic Activity (74174) Min:                               NMR re-education (88615)  Min:  CUES NEEDED        Manual Intervention ([phone]) Min:  25     Knee manual PROM supine flexion with slide board  PROM seated flexion with LLE distraction  PROM supine extension with heel prop and overpressure  Prone knee flexion   2/14: PROM supine Max 115 (HW)  2/16: 113* (TB)     Patellar mobs                   Modalities  Min: 10 VASO not covered by insurance           Other Therapeutic Activities: Pt was educated on PT POC, Diagnosis, Prognosis, pathomechanics as well as frequency and duration of scheduling future physical therapy appointments. Time was also taken on this day to answer all patient questions and participation in PT. Reviewed appointment policy in detail with patient and patient verbalized understanding.      Home Exercise Program: Patient was instructed in the following for HEP:       1/9: Access Code: [email or web address]  URL: [email or web address]  Date: 01/09/2023  Prepared by: Trupti Tabares    Exercises  Supine Heel Slide with Strap - 2-3 x daily - 4-6 x weekly - 2-3 sets - 10-15 reps  Supine Quadricep Sets - 1-3 x daily - 4-6 x weekly - 2-3 sets - 10 reps  Seated Long Arc Quad - 1-3 x daily - 4-6 x weekly - 2-3 sets - 10 reps  Mini Squat with Counter Support - 2-3 x daily - 4-6 x weekly - 2-3 sets - 10-15 reps  Heel Toe Raises with Counter Support - 1-3 x daily - 4-6 x weekly - 2-3 sets - 10 reps  Standing Hip Abduction with Counter Support - 2-3 x daily - 4-6 x weekly - 2-3 sets - 10-15 reps  Prone Hip Extension on Table - 2-3 x daily - 4-6 x weekly - 2-3 sets - 10-15 reps        . Patient verbalized/demonstrated understanding and was issued written handout. Therapeutic Exercise and NMR EXR  [x] (85221) Provided verbal/tactile cueing for activities related to strengthening, flexibility, endurance, ROM for improvements in LE, proximal hip, and core control with self care, mobility, lifting, ambulation.  [] (72806) Provided verbal/tactile cueing for activities related to improving balance, coordination, kinesthetic sense, posture, motor skill, proprioception  to assist with LE, proximal hip, and core control in self care, mobility, lifting, ambulation and eccentric single leg control.  [address] and Therapeutic Activities:    [] (30818 or 08476) Provided verbal/tactile cueing for activities related to improving balance, coordination, kinesthetic sense, posture, motor skill, proprioception and motor activation to allow for proper function of core, proximal hip and LE with self care and ADLs and functional mobility.   [] (99740) Gait Re-education- Provided training and instruction to the patient for proper LE, core and proximal hip recruitment and positioning and eccentric body weight control with ambulation re-education including up and down stairs     Home Exercise Program:    [] (75916) Reviewed/Progressed HEP activities related to strengthening, flexibility, endurance, ROM of core, proximal hip and LE for functional self-care, mobility, lifting and ambulation/stair navigation   [] (31188)Reviewed/Progressed HEP activities related to improving balance, coordination, kinesthetic sense, posture, motor skill, proprioception of core, proximal hip and LE for self care, mobility, lifting, and ambulation/stair navigation      Manual Treatments:  PROM / STM / Oscillations-Mobs:  G-I, II, III, IV (PA's, Inf., Post.)  [x] (04647) Provided manual therapy to mobilize LE, proximal hip and/or LS spine soft tissue/joints for the purpose of modulating pain, promoting relaxation,  increasing ROM, reducing/eliminating soft tissue swelling/inflammation/restriction, improving soft tissue extensibility and allowing for proper ROM for normal function with self care, mobility, lifting and ambulation. Charges:  Timed Code Treatment Minutes: 45   Total Treatment Minutes: 45      [] EVAL (LOW) 94592 (typically 20 minutes face-to-face)  [] EVAL (MOD) 67374 (typically 30 minutes face-to-face)  [] EVAL (HIGH) 82354 (typically 45 minutes face-to-face)  [] RE-EVAL     [x] PJ(49135) x   2  [] Dry needle 1 or 2 Muscles (63813)  [] NMR (60062) x     [] Dry needle 3+ Muscles (52083)  [x] Manual (75777) x    [] Ultrasound (67698) x  [] TA (53168) x     [] Mech Traction (89454)  [] ES(attended) (15016)     [] ES (un) (87702):   [] Vasopump (05378) [] Ionto (78854)   [] Other:    Sindy Gore stated goal: able to walk community distanced w/o AD. [x] Progressing: [] Met: [] Not Met: [] Adjusted    Therapist goals for Patient:   Short Term Goals: To be achieved in: 2 weeks  1. Independent in HEP and progression per patient tolerance, in order to prevent re-injury. [x] Progressing: [] Met: [] Not Met: [] Adjusted  2.  Patient will have a decrease in pain to facilitate improvement in movement, function, and ADLs as indicated by Functional Deficits. [x] Progressing: [] Met: [] Not Met: [] Adjusted    Long Term Goals: To be achieved in: at discharge  1. Decrease womac functional outcome score from 52% to 40% to assist with reaching prior level of function. [x] Progressing: [] Met: [] Not Met: [] Adjusted  2. Patient will demonstrate increased AROM to 0-120* to allow for proper joint functioning as indicated by patients Functional Deficits. [x] Progressing: [] Met: [] Not Met: [] Adjusted  3. Patient will demonstrate an increase in Strength to good proximal hip strength and control, within 5lb HHD in LE to allow for proper functional mobility as indicated by patients Functional Deficits. [x] Progressing: [] Met: [] Not Met: [] Adjusted  4. Patient will return to all functional activities without increased symptoms or restriction. [x] Progressing: [] Met: [] Not Met: [] Adjusted  5. Able to ambulate stairs reciprocally w/ 1 rail or less. [x] Progressing: [] Met: [] Not Met: [] Adjusted       ASSESSMENT:  2/11: Pt demonstrates significant improvement with Knee mobility following CHAPITO yesterday. Able to achieve PROM today of 0-112*. Moderate edema present in L knee and mild edema throughout L lower leg which are contributing to some of his tightness feeling in the knee. Reviewed with patient getting thigh compression stocking and elevating/ice knee frequently throughout the day for improved edema management. Pt will continue to benefit from skilled therapy to address mobility deficits and functional strength. 2-13-23  able to do full revolutions backwards/fwds on bike with some substitutions initially. Recommended getting thgih high compression stockings vs using knee highs up over knee. 2/16: laisha session well. Mod difficulty w/ lat step down d/t lack of functional strength. ROM is better than pre CHAPITO, he would like to get to 120*.  Able to do chair squat w/ min compensations, I'd like him to be able to do a lunge type movement with good form and improve his single leg strength. Treatment/Activity Tolerance:  [x] Patient tolerated treatment well [] Patient limited by fatique  [] Patient limited by pain  [] Patient limited by other medical complications  [] Other:     Overall Progression Towards Functional goals/ Treatment Progress Update:  [] Patient is progressing as expected towards functional goals listed. [] Progression is slowed due to complexities/Impairments listed. [] Progression has been slowed due to co-morbidities. [x] Plan just implemented, too soon to assess goals progression <30days   [] Goals require adjustment due to lack of progress  [] Patient is not progressing as expected and requires additional follow up with physician  [] Other    Prognosis for POC: [x] Good [] Fair  [] Poor    Patient requires continued skilled intervention: [x] Yes  [] No        PLAN:   PT recommends he cont up to 2x/wk for up to 8-12 weeks depending on medical necessity and restoration of function and goals met. 2/16: plan for 2x next week, then 1x/wk the next 2 weeks, future visits TBD and will need insurance auth. [x] Continue per plan of care [] Alter current plan (see comments)  [] Plan of care initiated [] Hold pending MD visit [] Discharge    Electronically signed by: Lorelee Hamman, PT , DPT  #[id number]        Note: If patient does not return for scheduled/recommended follow up visits, this note will serve as a discharge from care along with the most recent update on progress.

## 2023-02-17 ENCOUNTER — HOSPITAL ENCOUNTER (OUTPATIENT)
Dept: PHYSICAL THERAPY | Age: 63
Setting detail: THERAPIES SERIES
Discharge: HOME | End: 2023-02-17
Payer: COMMERCIAL

## 2023-02-17 PROCEDURE — 97110 THERAPEUTIC EXERCISES: CPT

## 2023-02-17 PROCEDURE — 97140 MANUAL THERAPY 1/> REGIONS: CPT

## 2023-02-17 NOTE — FLOWSHEET NOTE
[facility] - Outpatient Rehabilitation and Therapy, [facility]  [address] [place] [facility], [facility]  Phone: [phone]   Fax:     [phone]      Physical Therapy Treatment Note/ Progress Report:     Date:  2023    Patient Name:  Carlota Morrow    :  1960  MRN: [id number]    Pertinent Medical History:     Medical/Treatment Diagnosis Information:  Medical Diagnosis: Post-traumatic osteoarthritis of left knee [M17.32]  Treatment Diagnosis: s/p TKA, limited mobility, function, pain. Insurance/Certification information:  [facility], [facility]–1983 through [facility]   Physician Information:  Erik Curtis MD  Plan of care signed (Y/N): sent for cosign (received)    Date of Patient follow up with Physician:      Progress Report: []  Yes  [x]  No     Date Range for reporting period:  Beginnin2023  Ending:      Progress report due (10 Rx/or 30 days whichever is less): #51     Recertification due (POC duration/ or 90 days whichever is less):      Visit # POC/Insurance Allowable Auth Needed    AIM 10  - 23  6 - [x]Yes   []No     Latex Allergy:  [x]NO      []YES  Preferred Language for Healthcare:   [x]English       []Other:    Functional Scale:       Date assessed: at eval  Test:womac  Score:52% disability    Pain level: 2 /10     History of Injury:  Pt here for re-eval s/p L TKA DOS 22. He is amb w/ FWW with mod limp and mod TKE deficit. W/o walker, he has severe limp and limited Wbing on affected side. He reports long hx of knee problems stemming from the  when he had ACL and meniscus surgery. He was walking with crutches just prior to surgery. He works as an  and hopes to return to full function. He did have a few weeks of home PT and that went well. He has removed his post-op dressing, and he has self applied 2 strips of tape horizontally across his incision as he was in fear of his incision busting open. Knee is moderately swollen, but incision appears to be healing well. SUBJECTIVE:    1/12: pt reports doing ok. Trying to do HEP 3x/day and knee feels stiff. He is walking with less limp however. 1/19: pt states his knee feels stiff today. He is concerned about PT visit costs, so he inquired about reducing to 1x/wk. States he has been pushing hard as he can tolerate at home 3x/day, but pain is getting to be too much. He is considering going back to work which would be a sitting job in the next week or so, but he asked his surgeon who stated it was probably a bit too early. 1/23: pt states he has been working aggressively w/ 2 sessions a day 12 hrs apart, spending approx 30min of ROM work. He gets moderately sore from these sessions, and he does some other ex's during the middle of the day. He hope that today he has a much better ROM number as he is concerned about needing a manipulation. 1/26: knee feeling a little less bothered since backing off the Wbing stretches. 1-30-23 \"feels like a block of concrete\" Took oxycotin before PT today. F/U with MD 2-9-23   2/10  manip this AM         2/11: Has been working on bending knee at home. Notes difficulty sleeping due to pain. Pt states he has an upright bike at home he can use to ride for motion. Pt reports he has not been wearing his compression stocking. 2-13-23 just came from work. To PT with rolling walker, just took compression hose off.   2/14 - working hard on ROM at home. Feel that he is doing better  2/16: pt reports knee feels looser since CHAPITO. He is back to work, and also working on ROM on his own. He did purchase compression garment from local pharmacy which he feels is helping swelling.    2/17 - frustrated that ROM is not improving more quickly    OBJECTIVE:  Observation:   Test measurements:    ROM Prehab eval 1/9 1/12 1/19 1/23 1/26 1-30-23 2/11/23 2/16   knee R= 5* to 130*  L=10* to 130* L= 15* to 95* cold  Best: 8* to 99*  AAROM:  2 to 98*    PROM: 0 to 101* AAROM: (heel slide)   94*    PROM: 104* AAROM: 95*    PROM:   105* AAROM: 99*    PROM:  105*             PROM  0-106 AAROM3-108    PROM  0-112 AROM (after bike)  Mtipptt=820*    PROM: 113*                                                   Strength            Knee ext  At least 4-/5          Knee flex  At least 4-/5                                              TESTS/ OTHER            Circumference (superior patella)  R=44.5cm  L=48cm      Mod edema L knee ; min edema L lower leg                                                        RESTRICTIONS/PRECAUTIONS:     Exercises/Interventions:     Therapeutic Ex (35723)   Min: 30 Reps/Resistance Notes/CUES     Nustep 9 to 6 seat    Knee flexion stretch         6 min    X5 mins min on and off on step    Flexion to 115   Reformer 2 springs  20 sec x 10      LAQ    Reformer Reformer 2 springs  5 min  For ROM   HS curl    Leg Press 40# 2 x 10 sugar    Chair squat Full depth x 15       Pt edu Reviewed HEP: ROM focus, gradually progress functional quad/LE strength Therapeutic Activity (23726) Min:      Step ups add    Step down add                   NMR re-education (91037)  Min:  CUES NEEDED        Manual Intervention (97987) Min:  25     Knee manual PROM supine flexion with slide board    PROM seated flexion with LLE distraction    PROM supine extension with heel prop and overpressure    Prone knee flexion with OP  2/14: PROM supine Max 115 (HW)  2/16: 113* (TB)  2/17  115 supine max                       Modalities  Min: 10 VASO not covered by insurance           Other Therapeutic Activities: Pt was educated on PT POC, Diagnosis, Prognosis, pathomechanics as well as frequency and duration of scheduling future physical therapy appointments. Time was also taken on this day to answer all patient questions and participation in PT. Reviewed appointment policy in detail with patient and patient verbalized understanding.      Home Exercise Program: Patient was instructed in the following for HEP:       1/9: Access Code: [email or web address]  URL: [email or web address]  Date: 01/09/2023  Prepared by: Vinita Cunha    Exercises  Supine Heel Slide with Strap - 2-3 x daily - 4-6 x weekly - 2-3 sets - 10-15 reps  Supine Quadricep Sets - 1-3 x daily - 4-6 x weekly - 2-3 sets - 10 reps  Seated Long Arc Quad - 1-3 x daily - 4-6 x weekly - 2-3 sets - 10 reps  Mini Squat with Counter Support - 2-3 x daily - 4-6 x weekly - 2-3 sets - 10-15 reps  Heel Toe Raises with Counter Support - 1-3 x daily - 4-6 x weekly - 2-3 sets - 10 reps  Standing Hip Abduction with Counter Support - 2-3 x daily - 4-6 x weekly - 2-3 sets - 10-15 reps  Prone Hip Extension on Table - 2-3 x daily - 4-6 x weekly - 2-3 sets - 10-15 reps        . Patient verbalized/demonstrated understanding and was issued written handout. Therapeutic Exercise and NMR EXR  [x] (00439) Provided verbal/tactile cueing for activities related to strengthening, flexibility, endurance, ROM for improvements in LE, proximal hip, and core control with self care, mobility, lifting, ambulation.  [] (75081) Provided verbal/tactile cueing for activities related to improving balance, coordination, kinesthetic sense, posture, motor skill, proprioception  to assist with LE, proximal hip, and core control in self care, mobility, lifting, ambulation and eccentric single leg control.  [address] and Therapeutic Activities:    [] (50883 or 84911) Provided verbal/tactile cueing for activities related to improving balance, coordination, kinesthetic sense, posture, motor skill, proprioception and motor activation to allow for proper function of core, proximal hip and LE with self care and ADLs and functional mobility.   [] (24558) Gait Re-education- Provided training and instruction to the patient for proper LE, core and proximal hip recruitment and positioning and eccentric body weight control with ambulation re-education including up and down stairs     Home Exercise Program:    [] (59637) Reviewed/Progressed HEP activities related to strengthening, flexibility, endurance, ROM of core, proximal hip and LE for functional self-care, mobility, lifting and ambulation/stair navigation   [] (38444)Reviewed/Progressed HEP activities related to improving balance, coordination, kinesthetic sense, posture, motor skill, proprioception of core, proximal hip and LE for self care, mobility, lifting, and ambulation/stair navigation      Manual Treatments:  PROM / STM / Oscillations-Mobs:  G-I, II, III, IV (PA's, Inf., Post.)  [x] (00766) Provided manual therapy to mobilize LE, proximal hip and/or LS spine soft tissue/joints for the purpose of modulating pain, promoting relaxation,  increasing ROM, reducing/eliminating soft tissue swelling/inflammation/restriction, improving soft tissue extensibility and allowing for proper ROM for normal function with self care, mobility, lifting and ambulation. Charges:  Timed Code Treatment Minutes: 55   Total Treatment Minutes: 55      [] EVAL (LOW) 97263 (typically 20 minutes face-to-face)  [] EVAL (MOD) 82336 (typically 30 minutes face-to-face)  [] EVAL (HIGH) 46996 (typically 45 minutes face-to-face)  [] RE-EVAL     [x] GD(66762) x   2  [] Dry needle 1 or 2 Muscles (26612)  [] NMR (17721) x     [] Dry needle 3+ Muscles (65533)  [x] Manual (16559) x   2 [] Ultrasound (12644) x  [] TA (99133) x     [] Mech Traction (96877)  [] ES(attended) (40454)     [] ES (un) (91923):   [] Vasopump (37160) [] Ionto (87152)   [] Other:    Manuel Quiroz stated goal: able to walk community distanced w/o AD. [x] Progressing: [] Met: [] Not Met: [] Adjusted    Therapist goals for Patient:   Short Term Goals: To be achieved in: 2 weeks  1. Independent in HEP and progression per patient tolerance, in order to prevent re-injury. [x] Progressing: [] Met: [] Not Met: [] Adjusted  2.  Patient will have a decrease in pain to facilitate improvement in movement, function, and ADLs as indicated by Functional Deficits. [x] Progressing: [] Met: [] Not Met: [] Adjusted    Long Term Goals: To be achieved in: at discharge  1. Decrease womac functional outcome score from 52% to 40% to assist with reaching prior level of function. [x] Progressing: [] Met: [] Not Met: [] Adjusted  2. Patient will demonstrate increased AROM to 0-120* to allow for proper joint functioning as indicated by patients Functional Deficits. [x] Progressing: [] Met: [] Not Met: [] Adjusted  3. Patient will demonstrate an increase in Strength to good proximal hip strength and control, within 5lb HHD in LE to allow for proper functional mobility as indicated by patients Functional Deficits. [x] Progressing: [] Met: [] Not Met: [] Adjusted  4. Patient will return to all functional activities without increased symptoms or restriction. [x] Progressing: [] Met: [] Not Met: [] Adjusted  5. Able to ambulate stairs reciprocally w/ 1 rail or less. [x] Progressing: [] Met: [] Not Met: [] Adjusted       ASSESSMENT:  2/11: Pt demonstrates significant improvement with Knee mobility following CHAPITO yesterday. Able to achieve PROM today of 0-112*. Moderate edema present in L knee and mild edema throughout L lower leg which are contributing to some of his tightness feeling in the knee. Reviewed with patient getting thigh compression stocking and elevating/ice knee frequently throughout the day for improved edema management. Pt will continue to benefit from skilled therapy to address mobility deficits and functional strength. 2-13-23  able to do full revolutions backwards/fwds on bike with some substitutions initially. Recommended getting thgih high compression stockings vs using knee highs up over knee. 2/16: laisha session well. Mod difficulty w/ lat step down d/t lack of functional strength. ROM is better than pre CHAPITO, he would like to get to 120*.  Able to do chair squat w/ min compensations, I'd like him to be able to do a lunge type movement with good form and improve his single leg strength. Treatment/Activity Tolerance:  [x] Patient tolerated treatment well [] Patient limited by fatique  [] Patient limited by pain  [] Patient limited by other medical complications  [] Other:     Overall Progression Towards Functional goals/ Treatment Progress Update:  [] Patient is progressing as expected towards functional goals listed. [] Progression is slowed due to complexities/Impairments listed. [] Progression has been slowed due to co-morbidities. [x] Plan just implemented, too soon to assess goals progression <30days   [] Goals require adjustment due to lack of progress  [] Patient is not progressing as expected and requires additional follow up with physician  [] Other    Prognosis for POC: [x] Good [] Fair  [] Poor    Patient requires continued skilled intervention: [x] Yes  [] No        PLAN:   PT recommends he cont up to 2x/wk for up to 8-12 weeks depending on medical necessity and restoration of function and goals met. 2/16: plan for 2x next week, then 1x/wk the next 2 weeks, future visits TBD and will need insurance auth. [x] Continue per plan of care [] Alter current plan (see comments)  [] Plan of care initiated [] Hold pending MD visit [] Discharge    Electronically signed by: Wil David, PT ,DPT, MS  [id number]        Note: If patient does not return for scheduled/recommended follow up visits, this note will serve as a discharge from care along with the most recent update on progress.

## 2023-02-20 ENCOUNTER — HOSPITAL ENCOUNTER (OUTPATIENT)
Dept: PHYSICAL THERAPY | Age: 63
Setting detail: THERAPIES SERIES
Discharge: HOME | End: 2023-02-20
Payer: COMMERCIAL

## 2023-02-20 NOTE — FLOWSHEET NOTE
[place] [facility], [facility]  [address] [facility], [facility]  Phone: [phone]   Fax:     [phone]    Physical Therapy  Cancellation/No-show Note  Patient Name:  Lynette Lafleur  :  1960   Date:  2023  Cancelled visits to date: 4  No-shows to date: 0    Patient status for today's appointment patient:  [x]  Cancelled  []  Rescheduled appointment  []  No-show     Reason given by patient:  [x]  Patient ill  []  Conflicting appointment  []  No transportation    []  Conflict with work  []  No reason given  []  Other:     Comments:     Phone call information:   []  Phone call made today to patient at _ time at number provided:      []  Patient answered, conversation as follows:    []  Patient did not answer, message left as follows:    []  Phone call not made today    Electronically signed by:   Arley Eubanks PT

## 2023-02-23 ENCOUNTER — HOSPITAL ENCOUNTER (OUTPATIENT)
Dept: PHYSICAL THERAPY | Age: 63
Setting detail: THERAPIES SERIES
Discharge: HOME | End: 2023-02-23
Payer: COMMERCIAL

## 2023-02-23 PROCEDURE — 97110 THERAPEUTIC EXERCISES: CPT

## 2023-02-23 NOTE — FLOWSHEET NOTE
[facility], [address] [address]  Phone: [phone]   Fax:     [phone]      Physical Therapy Treatment Note/ Progress Report:     Date:  2023    Patient Name:  Michele Burleson    :  1960  MRN: [id number]    Pertinent Medical History:     Medical/Treatment Diagnosis Information:  Medical Diagnosis: Post-traumatic osteoarthritis of left knee [M17.32]  Treatment Diagnosis: s/p TKA, limited mobility, function, pain.    Insurance/Certification information:  [place] - auth through AIM   Physician Information:  Primitivo Calabrese MD  Plan of care signed (Y/N): sent for cosign (received)    Date of Patient follow up with Physician:      Progress Report: []  Yes  [x]  No     Date Range for reporting period:  Beginnin2023  Ending:      Progress report due (10 Rx/or 30 days whichever is less): #10     Recertification due (POC duration/ or 90 days whichever is less):      Visit # POC/Insurance Allowable Auth Needed    AIM 10  - 23  6 - [x]Yes   []No     Latex Allergy:  [x]NO      []YES  Preferred Language for Healthcare:   [x]English       []Other:    Functional Scale:       Date assessed: at eval  Test:womac  Score:52% disability    Pain level: 2 /10     History of Injury:  Pt here for re-eval s/p L TKA DOS 22. He is amb w/ FWW with mod limp and mod TKE deficit. W/o walker, he has severe limp and limited Wbing on affected side. He reports long hx of knee problems stemming from the  when he had ACL and meniscus surgery. He was walking with crutches just prior to surgery.     He works as an  and hopes to return to full function. He did have a few weeks of home PT and that went well. He has removed his post-op dressing, and he has self applied 2 strips of tape horizontally across his incision as he was in fear of his incision busting open.  Knee is moderately swollen, but incision appears to be healing well. SUBJECTIVE:    1/12: pt reports doing ok. Trying to do HEP 3x/day and knee feels stiff. He is walking with less limp however. 1/19: pt states his knee feels stiff today. He is concerned about PT visit costs, so he inquired about reducing to 1x/wk. States he has been pushing hard as he can tolerate at home 3x/day, but pain is getting to be too much. He is considering going back to work which would be a sitting job in the next week or so, but he asked his surgeon who stated it was probably a bit too early. 1/23: pt states he has been working aggressively w/ 2 sessions a day 12 hrs apart, spending approx 30min of ROM work. He gets moderately sore from these sessions, and he does some other ex's during the middle of the day. He hope that today he has a much better ROM number as he is concerned about needing a manipulation. 1/26: knee feeling a little less bothered since backing off the Wbing stretches. 1-30-23 \"feels like a block of concrete\" Took oxycotin before PT today. F/U with MD 2-9-23   2/10  manip this AM         2/11: Has been working on bending knee at home. Notes difficulty sleeping due to pain. Pt states he has an upright bike at home he can use to ride for motion. Pt reports he has not been wearing his compression stocking. 2-13-23 just came from work. To PT with rolling walker, just took compression hose off.   2/14 - working hard on ROM at home. Feel that he is doing better  2/16: pt reports knee feels looser since CHAPITO. He is back to work, and also working on ROM on his own. He did purchase compression garment from local pharmacy which he feels is helping swelling. 2/17 - frustrated that ROM is not improving more quickly  2/23: cont'd frustration with ROM. Knee feels stiff and sore most all the time. He's had a rough week and had been working today, so he hasn't been able to stretch his knee much. OBJECTIVE:  Observation:   Test measurements:    ROM Prehab eval 1/9 1/12 1/19 1/23 1/26 1-30-23 2/11/23 2/16 2/23   knee R= 5* to 130*  L=10* to 130* L= 15* to 95* cold  Best: 8* to 99*  AAROM:  2 to 98*    PROM: 0 to 101* AAROM: (heel slide)   94*    PROM: 104* AAROM: 95*    PROM:   105* AAROM: 99*    PROM:  105*             PROM  0-106 AAROM3-108    PROM  0-112 AROM (after bike)  Mihvjjc=008*    PROM: 113* AAROM:   Flex: approx 105-110*                                                        Strength             Knee ext  At least 4-/5           Knee flex  At least 4-/5                                                  TESTS/ OTHER             Circumference (superior patella)  R=44.5cm  L=48cm      Mod edema L knee ; min edema L lower leg                                                             RESTRICTIONS/PRECAUTIONS:     Exercises/Interventions:     Therapeutic Ex (88350)   Min:  Reps/Resistance Notes/CUES   Upright Bike   # 9          X 6 min         Reformer     Knee AAROM Heel slides x 15 Pt frustrated, may consider skipping in future   LAQ L:   11# x20  22# x 15    Reformer For ROM   HS curl 22# x 20    Leg Press >try unilat?    Chair squat resume   Step Down  FWD 8\" step stool x 10    Lat 8\" 2x10 R,L ea Mod soreness/ stiffness    Better tolerated lateral       Pt edu Reviewed HEP; work ROM, but incr focus on functional ROM and strength which may indirectly assist ROM gains Therapeutic Activity (90977) Min:      Pt edu Strongly encouraged him to cont to integrate and return to normal function such as descending stairs, rising from commode etc, to assist in gaining functional ROM                        NMR re-education (30662)  Min:  CUES NEEDED   Balance ex' >Add SLS, etc                   Manual Intervention (74305) Min:       Knee manual PROM supine, P/A mobs, HS str, tib rot mobs  2/14: PROM supine Max 115 (HW)  2/16: 113* (TB)  2/17  115 supine max                       Modalities  Min:  VASO not covered by insurance           Other Therapeutic Activities: Pt was educated on PT POC, Diagnosis, Prognosis, pathomechanics as well as frequency and duration of scheduling future physical therapy appointments. Time was also taken on this day to answer all patient questions and participation in PT. Reviewed appointment policy in detail with patient and patient verbalized understanding. Home Exercise Program: Patient was instructed in the following for HEP:       1/9: Access Code: [email or web address]  URL: [email or web address]  Date: 01/09/2023  Prepared by: Robertha Schlatter    Exercises  Supine Heel Slide with Strap - 2-3 x daily - 4-6 x weekly - 2-3 sets - 10-15 reps  Supine Quadricep Sets - 1-3 x daily - 4-6 x weekly - 2-3 sets - 10 reps  Seated Long Arc Quad - 1-3 x daily - 4-6 x weekly - 2-3 sets - 10 reps  Mini Squat with Counter Support - 2-3 x daily - 4-6 x weekly - 2-3 sets - 10-15 reps  Heel Toe Raises with Counter Support - 1-3 x daily - 4-6 x weekly - 2-3 sets - 10 reps  Standing Hip Abduction with Counter Support - 2-3 x daily - 4-6 x weekly - 2-3 sets - 10-15 reps  Prone Hip Extension on Table - 2-3 x daily - 4-6 x weekly - 2-3 sets - 10-15 reps        . Patient verbalized/demonstrated understanding and was issued written handout. Therapeutic Exercise and NMR EXR  [x] (03383) Provided verbal/tactile cueing for activities related to strengthening, flexibility, endurance, ROM for improvements in LE, proximal hip, and core control with self care, mobility, lifting, ambulation.  [] (84276) Provided verbal/tactile cueing for activities related to improving balance, coordination, kinesthetic sense, posture, motor skill, proprioception  to assist with LE, proximal hip, and core control in self care, mobility, lifting, ambulation and eccentric single leg control.  22101    NMR and Therapeutic Activities:    [] (92474 or ) Provided verbal/tactile cueing for activities related to improving balance, coordination, kinesthetic sense, posture, motor skill, proprioception and motor activation to allow for proper function of core, proximal hip and LE with self care and ADLs and functional mobility.   [] (76043) Gait Re-education- Provided training and instruction to the patient for proper LE, core and proximal hip recruitment and positioning and eccentric body weight control with ambulation re-education including up and down stairs     Home Exercise Program:    [] (45265) Reviewed/Progressed HEP activities related to strengthening, flexibility, endurance, ROM of core, proximal hip and LE for functional self-care, mobility, lifting and ambulation/stair navigation   [] (84995)Reviewed/Progressed HEP activities related to improving balance, coordination, kinesthetic sense, posture, motor skill, proprioception of core, proximal hip and LE for self care, mobility, lifting, and ambulation/stair navigation      Manual Treatments:  PROM / STM / Oscillations-Mobs:  G-I, II, III, IV (PA's, Inf., Post.)  [x] (57406) Provided manual therapy to mobilize LE, proximal hip and/or LS spine soft tissue/joints for the purpose of modulating pain, promoting relaxation,  increasing ROM, reducing/eliminating soft tissue swelling/inflammation/restriction, improving soft tissue extensibility and allowing for proper ROM for normal function with self care, mobility, lifting and ambulation.        Charges:  Timed Code Treatment Minutes: 35   Total Treatment Minutes: 35      [] EVAL (LOW) 56770 (typically 20 minutes face-to-face)  [] EVAL (MOD) 17133 (typically 30 minutes face-to-face)  [] EVAL (HIGH) 62456 (typically 45 minutes face-to-face)  [] RE-EVAL     [x] PX(23915) x   2  [] Dry needle 1 or 2 Muscles (99563)  [] NMR (23562) x     [] Dry needle 3+ Muscles (53957)  [] Manual (15403) x    [] Ultrasound (66282) x  [] TA (03344) x     [] Mech Traction (26723)  [] ES(attended) (60019)     [] ES (un) (06427):   [] Vasopump (41395) [] Archana (03252)   [] Other:    Manasa Saab stated goal: able to walk community distanced w/o AD. [x] Progressing: [] Met: [] Not Met: [] Adjusted    Therapist goals for Patient:   Short Term Goals: To be achieved in: 2 weeks  1. Independent in HEP and progression per patient tolerance, in order to prevent re-injury. [x] Progressing: [] Met: [] Not Met: [] Adjusted  2. Patient will have a decrease in pain to facilitate improvement in movement, function, and ADLs as indicated by Functional Deficits. [x] Progressing: [] Met: [] Not Met: [] Adjusted    Long Term Goals: To be achieved in: at discharge  1. Decrease womac functional outcome score from 52% to 40% to assist with reaching prior level of function. [x] Progressing: [] Met: [] Not Met: [] Adjusted  2. Patient will demonstrate increased AROM to 0-120* to allow for proper joint functioning as indicated by patients Functional Deficits. [x] Progressing: [] Met: [] Not Met: [] Adjusted  3. Patient will demonstrate an increase in Strength to good proximal hip strength and control, within 5lb HHD in LE to allow for proper functional mobility as indicated by patients Functional Deficits. [x] Progressing: [] Met: [] Not Met: [] Adjusted  4. Patient will return to all functional activities without increased symptoms or restriction. [x] Progressing: [] Met: [] Not Met: [] Adjusted  5. Able to ambulate stairs reciprocally w/ 1 rail or less. [x] Progressing: [] Met: [] Not Met: [] Adjusted       ASSESSMENT:  2/11: Pt demonstrates significant improvement with Knee mobility following CHAPITO yesterday. Able to achieve PROM today of 0-112*. Moderate edema present in L knee and mild edema throughout L lower leg which are contributing to some of his tightness feeling in the knee. Reviewed with patient getting thigh compression stocking and elevating/ice knee frequently throughout the day for improved edema management.   Pt will continue to benefit from skilled therapy to address mobility deficits and functional strength. 2-13-23  able to do full revolutions backwards/fwds on bike with some substitutions initially. Recommended getting thgih high compression stockings vs using knee highs up over knee. 2/16: laisha session well. Mod difficulty w/ lat step down d/t lack of functional strength. ROM is better than pre CHAPITO, he would like to get to 120*. Able to do chair squat w/ min compensations, I'd like him to be able to do a lunge type movement with good form and improve his single leg strength.   2/23: pt appears to be getting frustrated with struggles with ROM. Today we did less aggressive manual work as he hasn't seen a great deal of progress over the past week or so. Instead we focused on functional strength as this is an area he has been avoiding. In turn, improving his function with this daily movements may continue to assist his ROM. Treatment/Activity Tolerance:  [x] Patient tolerated treatment well [] Patient limited by fatique  [] Patient limited by pain  [] Patient limited by other medical complications  [] Other:     Overall Progression Towards Functional goals/ Treatment Progress Update:  [] Patient is progressing as expected towards functional goals listed. [] Progression is slowed due to complexities/Impairments listed. [] Progression has been slowed due to co-morbidities. [x] Plan just implemented, too soon to assess goals progression <30days   [] Goals require adjustment due to lack of progress  [] Patient is not progressing as expected and requires additional follow up with physician  [] Other    Prognosis for POC: [x] Good [] Fair  [] Poor    Patient requires continued skilled intervention: [x] Yes  [] No        PLAN:   PT recommends he cont up to 2x/wk for up to 8-12 weeks depending on medical necessity and restoration of function and goals met. 2/16: plan for 2x next week, then 1x/wk the next 2 weeks, future visits TBD and will need insurance auth. [x] Continue per plan of care [] Alter current plan (see comments)  [] Plan of care initiated [] Hold pending MD visit [] Discharge    Electronically signed by: Maykel Palafox PT ,, DPT  #[id number]          Note: If patient does not return for scheduled/recommended follow up visits, this note will serve as a discharge from care along with the most recent update on progress.

## 2023-03-06 ENCOUNTER — OFFICE VISIT (OUTPATIENT)
Dept: ORTHOPEDIC SURGERY | Age: 63
End: 2023-03-06

## 2023-03-06 VITALS — HEIGHT: 74 IN | BODY MASS INDEX: 29.39 KG/M2 | WEIGHT: 229 LBS | RESPIRATION RATE: 16 BRPM

## 2023-03-06 DIAGNOSIS — Z96.652 STATUS POST TOTAL LEFT KNEE REPLACEMENT: Primary | ICD-10-CM

## 2023-03-06 PROCEDURE — 99024 POSTOP FOLLOW-UP VISIT: CPT | Performed by: ORTHOPAEDIC SURGERY

## 2023-03-06 NOTE — PROGRESS NOTES
[facility]  Office Visit    Chief Complaint: Follow-up s/p left total knee arthroplasty, manipulation    HPI:  Edilia Ahuja is a 60 y.o. who is here in follow-up of left total knee arthroplasty performed on December 21, 2022. Manipulation for arthrofibrosis on February 10, 2023. He walks without assistive device. He is done with formal physical therapy. He continues to be active on an exercise bike. He is off of narcotic pain medication. He continues to report stiffness and soreness in the knee. He is gradually improving. Patient Active Problem List   Diagnosis    Psoriasis    Abnormal CT scan, chest       ROS:  Constitutional: denies fever, chills, weight loss  MSK: denies pain in other joints, muscle aches  Neurological: denies numbness, tingling, weakness    Exam:  Resp. rate 16, height 6' 2\" (1.88 m), weight 229 lb (103.9 kg). Appearance: sitting in exam room chair, appears to be in no acute distress, awake and alert  Resp: unlabored breathing on room air  Skin: warm, dry and intact with out erythema or significant increased temperature  Neuro: grossly intact both lower extremities. Intact sensation to light touch. Motor exam 4+ to 5/5 in all major motor groups. Left knee: Incision is healing as expected. Range of motion is 5-115 degrees. Sensation is intact light touch. There is brisk capillary refill. There is 5/5 muscle strength in all muscle groups. Imaging:  None    Assessment:  S/p left total knee arthroplasty, s/p manipulation    Plan:  He is recovering well postoperatively. He will continue working on self-directed physical therapy exercises and using an exercise bike. We discussed that he should continue to improve over time. Follow-up in 6 weeks for repeat evaluation and radiographs. This dictation was done with [employer] dictation and may contain mechanical errors related to translation.

## 2023-03-20 RX ORDER — LORATADINE 10 MG/1
TABLET ORAL
Qty: 30 TABLET | Refills: 2 | Status: SHIPPED | OUTPATIENT
Start: 2023-03-20

## 2023-04-26 RX ORDER — ATORVASTATIN CALCIUM 10 MG/1
TABLET, FILM COATED ORAL
Qty: 30 TABLET | Refills: 3 | Status: SHIPPED | OUTPATIENT
Start: 2023-04-26

## 2023-04-26 NOTE — TELEPHONE ENCOUNTER
Last office visit 11/29/2022     Last written      Next office visit scheduled Visit date not found    Requested Prescriptions     Pending Prescriptions Disp Refills    atorvastatin (LIPITOR) 10 MG tablet [Pharmacy Med Name: ATORVASTATIN 10 MG TABLET] 30 tablet 3     Sig: TAKE ONE TABLET BY MOUTH EVERY MORNING

## 2023-09-13 RX ORDER — LORATADINE 10 MG/1
TABLET ORAL
Qty: 30 TABLET | Refills: 2 | Status: SHIPPED | OUTPATIENT
Start: 2023-09-13

## 2023-09-29 ENCOUNTER — OFFICE VISIT (OUTPATIENT)
Dept: FAMILY MEDICINE CLINIC | Age: 63
End: 2023-09-29
Payer: COMMERCIAL

## 2023-09-29 VITALS
BODY MASS INDEX: 31.06 KG/M2 | HEIGHT: 74 IN | DIASTOLIC BLOOD PRESSURE: 78 MMHG | WEIGHT: 242 LBS | SYSTOLIC BLOOD PRESSURE: 108 MMHG

## 2023-09-29 DIAGNOSIS — E78.5 HYPERLIPIDEMIA, UNSPECIFIED HYPERLIPIDEMIA TYPE: ICD-10-CM

## 2023-09-29 DIAGNOSIS — R42 LIGHTHEADEDNESS: ICD-10-CM

## 2023-09-29 DIAGNOSIS — R07.9 CHEST PAIN, UNSPECIFIED TYPE: Primary | ICD-10-CM

## 2023-09-29 LAB
ALBUMIN SERPL-MCNC: 4.4 G/DL (ref 3.4–5)
ALBUMIN/GLOB SERPL: 2.3 {RATIO} (ref 1.1–2.2)
ALP SERPL-CCNC: 80 U/L (ref 40–129)
ALT SERPL-CCNC: 19 U/L (ref 10–40)
ANION GAP SERPL CALCULATED.3IONS-SCNC: 10 MMOL/L (ref 3–16)
AST SERPL-CCNC: 17 U/L (ref 15–37)
BASOPHILS # BLD: 0 K/UL (ref 0–0.2)
BASOPHILS NFR BLD: 0.6 %
BILIRUB SERPL-MCNC: 0.3 MG/DL (ref 0–1)
BUN SERPL-MCNC: 23 MG/DL (ref 7–20)
CALCIUM SERPL-MCNC: 9 MG/DL (ref 8.3–10.6)
CHLORIDE SERPL-SCNC: 103 MMOL/L (ref 99–110)
CHOLEST SERPL-MCNC: 162 MG/DL (ref 0–199)
CO2 SERPL-SCNC: 26 MMOL/L (ref 21–32)
CREAT SERPL-MCNC: 0.9 MG/DL (ref 0.8–1.3)
DEPRECATED RDW RBC AUTO: 13.9 % (ref 12.4–15.4)
EOSINOPHIL # BLD: 0.2 K/UL (ref 0–0.6)
EOSINOPHIL NFR BLD: 5.6 %
GFR SERPLBLD CREATININE-BSD FMLA CKD-EPI: >60 ML/MIN/{1.73_M2}
GLUCOSE SERPL-MCNC: 91 MG/DL (ref 70–99)
HCT VFR BLD AUTO: 40.6 % (ref 40.5–52.5)
HDLC SERPL-MCNC: 45 MG/DL (ref 40–60)
HGB BLD-MCNC: 13.9 G/DL (ref 13.5–17.5)
LDLC SERPL CALC-MCNC: 92 MG/DL
LYMPHOCYTES # BLD: 0.9 K/UL (ref 1–5.1)
LYMPHOCYTES NFR BLD: 20.5 %
MCH RBC QN AUTO: 29.5 PG (ref 26–34)
MCHC RBC AUTO-ENTMCNC: 34.3 G/DL (ref 31–36)
MCV RBC AUTO: 86.1 FL (ref 80–100)
MONOCYTES # BLD: 0.5 K/UL (ref 0–1.3)
MONOCYTES NFR BLD: 12.4 %
NEUTROPHILS # BLD: 2.7 K/UL (ref 1.7–7.7)
NEUTROPHILS NFR BLD: 60.9 %
PLATELET # BLD AUTO: 190 K/UL (ref 135–450)
PMV BLD AUTO: 9.2 FL (ref 5–10.5)
POTASSIUM SERPL-SCNC: 4.5 MMOL/L (ref 3.5–5.1)
PROT SERPL-MCNC: 6.3 G/DL (ref 6.4–8.2)
RBC # BLD AUTO: 4.72 M/UL (ref 4.2–5.9)
SODIUM SERPL-SCNC: 139 MMOL/L (ref 136–145)
TRIGL SERPL-MCNC: 127 MG/DL (ref 0–150)
VLDLC SERPL CALC-MCNC: 25 MG/DL
WBC # BLD AUTO: 4.4 K/UL (ref 4–11)

## 2023-09-29 PROCEDURE — 93000 ELECTROCARDIOGRAM COMPLETE: CPT | Performed by: FAMILY MEDICINE

## 2023-09-29 PROCEDURE — 99214 OFFICE O/P EST MOD 30 MIN: CPT | Performed by: FAMILY MEDICINE

## 2023-09-29 ASSESSMENT — PATIENT HEALTH QUESTIONNAIRE - PHQ9
SUM OF ALL RESPONSES TO PHQ9 QUESTIONS 1 & 2: 0
1. LITTLE INTEREST OR PLEASURE IN DOING THINGS: 0
2. FEELING DOWN, DEPRESSED OR HOPELESS: 0
SUM OF ALL RESPONSES TO PHQ QUESTIONS 1-9: 0

## 2023-09-29 NOTE — PROGRESS NOTES
2023     Dulce Burleson (:  1960) is a 58 y.o. male, here for evaluation of the following medical concerns:    HPI    Patient presented to the clinic with several concerns today. Dizziness , getting out of bed, quick movements make it worse, hasn't had this before, presents itself as BPPV. Chest pain- on wed. Was siting not exerting, 15 minutes,slowely faded, pressure, no SOB, patient very tired,  had stress test several years ago, will refer to cardiology    EKG   Sinus  Rhythm  -First degree A-V block   Tresa = 236  BORDERLINE RHYTHM    Hyperlipidemia- will check labs today, is on Atorvastatin    Today, patient is stable, denied n, v, or diarrhea. Review of Systems   Constitutional:  Negative for activity change and fatigue. Respiratory:  Negative for shortness of breath. Cardiovascular:  Positive for chest pain. Negative for palpitations and leg swelling. Gastrointestinal:  Negative for abdominal pain, diarrhea, nausea and vomiting. Musculoskeletal:  Positive for arthralgias. Negative for back pain. Neurological:  Positive for light-headedness. Negative for dizziness, tremors, seizures, syncope, facial asymmetry, speech difficulty, weakness, numbness and headaches. Psychiatric/Behavioral:  Negative for dysphoric mood. The patient is not nervous/anxious. Prior to Visit Medications    Medication Sig Taking?  Authorizing Provider   loratadine (CLARITIN) 10 MG tablet TAKE ONE TABLET BY MOUTH DAILY Yes Arsalan Banegas DO   atorvastatin (LIPITOR) 10 MG tablet TAKE ONE TABLET BY MOUTH EVERY MORNING Yes Arsalan Banegas DO   sildenafil (VIAGRA) 50 MG tablet Take 1 tablet by mouth as needed for Erectile Dysfunction Yes Arsalan Banegas, DO   clobetasol propionate 0.05 % GEL gel Apply 1 application topically as needed Yes Provider, MD Latasha   ketoconazole (NIZORAL) 2 % cream Apply 1 application topically as needed Yes ProviderLatasha MD   ibuprofen

## 2023-10-06 ENCOUNTER — HOSPITAL ENCOUNTER (EMERGENCY)
Age: 63
Discharge: HOME | End: 2023-10-06
Attending: STUDENT IN AN ORGANIZED HEALTH CARE EDUCATION/TRAINING PROGRAM
Payer: COMMERCIAL

## 2023-10-06 ENCOUNTER — APPOINTMENT (OUTPATIENT)
Dept: GENERAL RADIOLOGY | Age: 63
End: 2023-10-06
Payer: COMMERCIAL

## 2023-10-06 VITALS
BODY MASS INDEX: 30.74 KG/M2 | RESPIRATION RATE: 14 BRPM | OXYGEN SATURATION: 99 % | TEMPERATURE: 98 F | DIASTOLIC BLOOD PRESSURE: 91 MMHG | HEART RATE: 74 BPM | SYSTOLIC BLOOD PRESSURE: 119 MMHG | WEIGHT: 239.42 LBS

## 2023-10-06 DIAGNOSIS — S68.012A AMPUTATION OF LEFT THUMB, INITIAL ENCOUNTER: Primary | ICD-10-CM

## 2023-10-06 DIAGNOSIS — Z23 NEED FOR TETANUS BOOSTER: ICD-10-CM

## 2023-10-06 PROCEDURE — 90715 TDAP VACCINE 7 YRS/> IM: CPT | Performed by: NURSE PRACTITIONER

## 2023-10-06 PROCEDURE — 6370000000 HC RX 637 (ALT 250 FOR IP): Performed by: NURSE PRACTITIONER

## 2023-10-06 PROCEDURE — 73130 X-RAY EXAM OF HAND: CPT

## 2023-10-06 PROCEDURE — 6360000002 HC RX W HCPCS: Performed by: NURSE PRACTITIONER

## 2023-10-06 PROCEDURE — 99284 EMERGENCY DEPT VISIT MOD MDM: CPT

## 2023-10-06 PROCEDURE — 90471 IMMUNIZATION ADMIN: CPT | Performed by: NURSE PRACTITIONER

## 2023-10-06 RX ORDER — SULFAMETHOXAZOLE AND TRIMETHOPRIM 800; 160 MG/1; MG/1
1 TABLET ORAL 2 TIMES DAILY
Qty: 14 TABLET | Refills: 0 | Status: SHIPPED | OUTPATIENT
Start: 2023-10-06 | End: 2023-10-13

## 2023-10-06 RX ORDER — CEPHALEXIN 500 MG/1
500 CAPSULE ORAL 2 TIMES DAILY
Qty: 14 CAPSULE | Refills: 0 | Status: SHIPPED | OUTPATIENT
Start: 2023-10-06 | End: 2023-10-13

## 2023-10-06 RX ORDER — CEPHALEXIN 500 MG/1
500 CAPSULE ORAL ONCE
Status: COMPLETED | OUTPATIENT
Start: 2023-10-06 | End: 2023-10-06

## 2023-10-06 RX ORDER — IBUPROFEN 600 MG/1
600 TABLET ORAL ONCE
Status: COMPLETED | OUTPATIENT
Start: 2023-10-06 | End: 2023-10-06

## 2023-10-06 RX ORDER — ACETAMINOPHEN 325 MG/1
650 TABLET ORAL ONCE
Status: COMPLETED | OUTPATIENT
Start: 2023-10-06 | End: 2023-10-06

## 2023-10-06 RX ORDER — IBUPROFEN 600 MG/1
600 TABLET ORAL 3 TIMES DAILY PRN
Qty: 15 TABLET | Refills: 0 | Status: SHIPPED | OUTPATIENT
Start: 2023-10-06 | End: 2023-10-11

## 2023-10-06 RX ORDER — SULFAMETHOXAZOLE AND TRIMETHOPRIM 800; 160 MG/1; MG/1
1 TABLET ORAL 2 TIMES DAILY
Qty: 14 TABLET | Refills: 0 | Status: SHIPPED | OUTPATIENT
Start: 2023-10-06 | End: 2023-10-06 | Stop reason: SDUPTHER

## 2023-10-06 RX ORDER — ACETAMINOPHEN 500 MG
500 TABLET ORAL 4 TIMES DAILY PRN
Qty: 28 TABLET | Refills: 0 | Status: SHIPPED | OUTPATIENT
Start: 2023-10-06 | End: 2023-10-11 | Stop reason: ALTCHOICE

## 2023-10-06 RX ORDER — SULFAMETHOXAZOLE AND TRIMETHOPRIM 800; 160 MG/1; MG/1
1 TABLET ORAL ONCE
Status: COMPLETED | OUTPATIENT
Start: 2023-10-06 | End: 2023-10-06

## 2023-10-06 RX ORDER — HYDROCODONE BITARTRATE AND ACETAMINOPHEN 5; 325 MG/1; MG/1
1 TABLET ORAL EVERY 8 HOURS PRN
Qty: 9 TABLET | Refills: 0 | Status: SHIPPED | OUTPATIENT
Start: 2023-10-06 | End: 2023-10-09

## 2023-10-06 RX ORDER — IBUPROFEN 200 MG
TABLET ORAL ONCE
Status: COMPLETED | OUTPATIENT
Start: 2023-10-06 | End: 2023-10-06

## 2023-10-06 RX ORDER — CEPHALEXIN 500 MG/1
500 CAPSULE ORAL 2 TIMES DAILY
Qty: 14 CAPSULE | Refills: 0 | Status: SHIPPED | OUTPATIENT
Start: 2023-10-06 | End: 2023-10-06 | Stop reason: SDUPTHER

## 2023-10-06 RX ADMIN — SULFAMETHOXAZOLE AND TRIMETHOPRIM 1 TABLET: 800; 160 TABLET ORAL at 18:55

## 2023-10-06 RX ADMIN — TETANUS TOXOID, REDUCED DIPHTHERIA TOXOID AND ACELLULAR PERTUSSIS VACCINE, ADSORBED 0.5 ML: 5; 2.5; 8; 8; 2.5 SUSPENSION INTRAMUSCULAR at 17:49

## 2023-10-06 RX ADMIN — BACITRACIN ZINC, NEOMYCIN, POLYMYXIN B SULFAT: 5000; 3.5; 4 OINTMENT TOPICAL at 18:01

## 2023-10-06 RX ADMIN — ACETAMINOPHEN 650 MG: 325 TABLET ORAL at 17:47

## 2023-10-06 RX ADMIN — IBUPROFEN 600 MG: 600 TABLET, FILM COATED ORAL at 17:47

## 2023-10-06 RX ADMIN — CEPHALEXIN 500 MG: 500 CAPSULE ORAL at 18:55

## 2023-10-06 ASSESSMENT — PAIN DESCRIPTION - DESCRIPTORS
DESCRIPTORS: ACHING;DULL
DESCRIPTORS: ACHING
DESCRIPTORS: ACHING;DULL

## 2023-10-06 ASSESSMENT — PAIN DESCRIPTION - LOCATION
LOCATION: FINGER (COMMENT WHICH ONE)

## 2023-10-06 ASSESSMENT — PAIN DESCRIPTION - ORIENTATION
ORIENTATION: LEFT

## 2023-10-06 ASSESSMENT — ENCOUNTER SYMPTOMS
DIARRHEA: 0
BACK PAIN: 0
NAUSEA: 0
VOMITING: 0

## 2023-10-06 ASSESSMENT — PAIN SCALES - GENERAL
PAINLEVEL_OUTOF10: 3
PAINLEVEL_OUTOF10: 6
PAINLEVEL_OUTOF10: 4

## 2023-10-06 ASSESSMENT — PAIN - FUNCTIONAL ASSESSMENT
PAIN_FUNCTIONAL_ASSESSMENT: 0-10
PAIN_FUNCTIONAL_ASSESSMENT: PREVENTS OR INTERFERES SOME ACTIVE ACTIVITIES AND ADLS

## 2023-10-06 ASSESSMENT — PAIN DESCRIPTION - PAIN TYPE: TYPE: ACUTE PAIN

## 2023-10-06 NOTE — ED NOTES
Pt discharged at this time. Discharge instructions and medications reviewed,  Questions were answered. PT verbalized understanding. VSS, Afebrile. Follow up appointments were discussed.         Pedro Cabral RN  10/06/23 [id number]

## 2023-10-06 NOTE — DISCHARGE INSTRUCTIONS
Return to the emergency department worsening symptoms including limited to developing increased pain, redness, swelling, discharge from the avulsed AREA, inability to tolerate food or drink, nausea, vomit, fever, chills, sweats, other symptoms/concerns. Medications as prescribed ensuring that you can dedicate least 8 hours of sleep after taking the Norco which is a narcotic pain medication and causes drowsiness. Do not otherwise become drive, operate machinery, or sign (without consulting this medication. Complete the full course of both antibiotics without missing doses. Do not submerge your hand in water clean with soap and water daily. After cleaning and if the area does get wet please blot dry and reapply dressing. Be aware that each dose of Norco contains 325 mg of Tylenol. Do not consume more than 3 g or 3000 mg of Tylenol in any 24-hour period.     You should receive a call from the orthopedic/hand specialist's office by Monday at noon if you have not heard from their office please call to schedule for follow-up

## 2023-10-06 NOTE — ED NOTES
Wound care completed with dileep-hex irrigation on left thumb. POS applied then non-stick dressing then wrapped in gauze. Pt tolerated well.      Gene Tovar RN  10/06/23 2123

## 2023-10-09 ENCOUNTER — TELEPHONE (OUTPATIENT)
Dept: ORTHOPEDIC SURGERY | Age: 63
End: 2023-10-09

## 2023-10-09 ASSESSMENT — ENCOUNTER SYMPTOMS
NAUSEA: 0
VOMITING: 0
DIARRHEA: 0
SHORTNESS OF BREATH: 0
BACK PAIN: 0
ABDOMINAL PAIN: 0

## 2023-10-09 NOTE — TELEPHONE ENCOUNTER
Other PATIENT WOULD LIKE TO BE WORKED IN IS REQUESTING A CALL BACK AT WORK IF YOU CAN REACH HIM ON HIS CELL.  [id number] [address] [phone]

## 2023-10-09 NOTE — TELEPHONE ENCOUNTER
Did leave message regarding ED ref for an appointment. Upon return call please schedule with Dr. Bambi Leo.

## 2023-10-09 NOTE — ED PROVIDER NOTES
[facility] [address] [address]        Pt Name: Kenyatta Edmonds  MRN: [id number]  [facility] 1960  Date of evaluation: 10/6/2023  Provider: SHERRELL Ghotra - CNP  PCP: Diego Boston DO  Note Started: 4:59 PM EDT 10/6/23       I have seen and evaluated this patient with my supervising physician Lily Orozco MD.      CHIEF COMPLAINT       No chief complaint on file. HISTORY OF PRESENT ILLNESS: 1 or more Elements     History from : Patient    Limitations to history : None    Kenyatta Edmonds is a 58 y.o. nontoxic, well-appearing male who presents to the emergency room for evaluation of left thumb pain status post he cut himself while utilizing a table saw at 1500 hrs. He endorses \"dull\" 4/10 thumb pain. Denies nausea vomiting fever chills, sweats, Dr. Waldemar White. He is unsure of his status. Nursing Notes were all reviewed and agreed with or any disagreements were addressed in the HPI. REVIEW OF SYSTEMS :      Review of Systems   Constitutional:  Negative for chills, diaphoresis, fatigue and fever. Eyes:  Negative for visual disturbance. Gastrointestinal:  Negative for diarrhea, nausea and vomiting. Musculoskeletal:  Positive for arthralgias (Limited to the distal thumb). Negative for back pain, neck pain and neck stiffness. Skin:  Positive for wound (+ Avulsion laceration of left thumb distally). Negative for rash. Neurological:  Negative for dizziness, weakness, light-headedness and numbness. Hematological: Negative. Psychiatric/Behavioral: Negative. Positives and Pertinent negatives as per HPI.      SURGICAL HISTORY     Past Surgical History:   Procedure Laterality Date    ANTERIOR CRUCIATE LIGAMENT REPAIR Left 1990    APPENDECTOMY      BRONCHOSCOPY  12/12/2016    COLONOSCOPY N/A 9/29/2020    COLONOSCOPY WITH BIOPSY performed by Ana Laura Lopez MD at [address] Left
provider's documentation. Pretty Bergman MD     This report has been produced using speech recognition software and may contain errors related to that system including errors in grammar, punctuation, and spelling, as well as words and phrases that may be inappropriate. If there are any questions or concerns please feel free to contact the dictating provider for clarification.       Pretty Bergman MD  10/08/23 4808

## 2023-10-11 ENCOUNTER — TELEPHONE (OUTPATIENT)
Dept: ORTHOPEDIC SURGERY | Age: 63
End: 2023-10-11

## 2023-10-11 ENCOUNTER — OFFICE VISIT (OUTPATIENT)
Dept: ORTHOPEDIC SURGERY | Age: 63
End: 2023-10-11
Payer: COMMERCIAL

## 2023-10-11 VITALS — HEIGHT: 74 IN | BODY MASS INDEX: 30.67 KG/M2 | RESPIRATION RATE: 16 BRPM | WEIGHT: 239 LBS

## 2023-10-11 DIAGNOSIS — S68.119A AMPUTATION OF TIP OF FINGER, INITIAL ENCOUNTER: Primary | ICD-10-CM

## 2023-10-11 PROCEDURE — 99204 OFFICE O/P NEW MOD 45 MIN: CPT | Performed by: PHYSICIAN ASSISTANT

## 2023-10-11 RX ORDER — HYDROCODONE BITARTRATE AND ACETAMINOPHEN 5; 325 MG/1; MG/1
1 TABLET ORAL EVERY 8 HOURS PRN
COMMUNITY

## 2023-10-11 NOTE — PATIENT INSTRUCTIONS
Pre-Operative Instructions    1. The night before your surgery, unless otherwise instructed, do not eat any food, drink any liquids, chew gum or mints after midnight. Abstain from alcohol for 24 hours prior to surgery. 2. You will be contacted by the Hospital the working day prior to your procedure to confirm your arrival time. 3. Patients under 25years of age must have a parent or legal guardian present to sign their consent and discharge paperwork. 4. On the day of surgery,  you will be seen pre-operatively by an anesthesiologist.     5. If you are having hand surgery, it is recommended that nail polish and acrylic nails be removed prior to surgery if possible. 6. Please bring cases for glasses, contact lenses, hearing aids or dentures. They will likely be removed prior to surgery. 7. Wear casual, loose-fitting and comfortable clothing. Consider that you may have a large dressing to fit under your clothing after surgery. 9. Please do not bring valuables such as jewelry or large sums of cash to the hospital. Remove all body piercings before coming to the hospital. Kyree Mendiola may not  wear any rings on the hand if you are having surgery on that hand, wrist or elbow. 10. Do not smoke or chew tobacco before your surgery. [facility] [facility] and surgery facilities are smoke-free environments. Smoking is not permitted anywhere on campus. 11. Be sure to follow any additional instructions from your physician. If the above conditions are not met, your surgery may be cancelled and rescheduled for another day. Should you develop any change in your health such as fever, cough, sore throat, cold, flu, or infection, or if you have any questions regarding your Pre-admission or surgery, please contact [id number] [facility] - Surgery Scheduling at [phone], Monday through Friday, 9 a.m. to 5 p.m.

## 2023-10-11 NOTE — PROGRESS NOTES
other treatment options available to him for this condition. We have today selected to proceed with Surgical treatment. He has voiced and  understanding that there are other less aggressive treatment options which are available in this situation, albeit possibly less efficacious or durable, and he is comfortable with the plan that he has chosen. Mr. Earlene Stahl has been given a full verbal list of instructions and precautions related to his present condition. I have asked him to followup with me in the office at the prescribed time. He is also specifically requested to call or return to the office sooner if his symptoms change or worsen prior to the next scheduled appointment.

## 2023-10-11 NOTE — TELEPHONE ENCOUNTER
Auth: NPR  Date: 2023-10-17 - 2024-01-17  Reference # Transaction ID: [id number]  Spoke with: NONE  Type of SX: OUTPATIENT  Location: [address] [place] [address]  CPT:  [id number], [address]  DX: [phone]  SX area:  HAND  Insurance: [address] [place] [address]

## 2023-10-16 ENCOUNTER — ANESTHESIA EVENT (OUTPATIENT)
Dept: OPERATING ROOM | Age: 63
End: 2023-10-16
Payer: COMMERCIAL

## 2023-10-17 ENCOUNTER — ANESTHESIA (OUTPATIENT)
Dept: OPERATING ROOM | Age: 63
End: 2023-10-17
Payer: COMMERCIAL

## 2023-10-17 ENCOUNTER — HOSPITAL ENCOUNTER (OUTPATIENT)
Age: 63
Setting detail: OUTPATIENT SURGERY
Discharge: HOME | End: 2023-10-17
Attending: ORTHOPAEDIC SURGERY | Admitting: ORTHOPAEDIC SURGERY
Payer: COMMERCIAL

## 2023-10-17 VITALS
BODY MASS INDEX: 30.8 KG/M2 | TEMPERATURE: 97.9 F | RESPIRATION RATE: 13 BRPM | HEIGHT: 74 IN | SYSTOLIC BLOOD PRESSURE: 137 MMHG | HEART RATE: 67 BPM | WEIGHT: 240 LBS | OXYGEN SATURATION: 96 % | DIASTOLIC BLOOD PRESSURE: 73 MMHG

## 2023-10-17 PROBLEM — S68.119A AMPUTATION, FINGER, TRAUMATIC: Status: ACTIVE | Noted: 2023-10-17

## 2023-10-17 PROCEDURE — 3700000000 HC ANESTHESIA ATTENDED CARE: Performed by: ORTHOPAEDIC SURGERY

## 2023-10-17 PROCEDURE — 3600000005 HC SURGERY LEVEL 5 BASE: Performed by: ORTHOPAEDIC SURGERY

## 2023-10-17 PROCEDURE — 6360000002 HC RX W HCPCS: Performed by: NURSE ANESTHETIST, CERTIFIED REGISTERED

## 2023-10-17 PROCEDURE — 2580000003 HC RX 258: Performed by: ANESTHESIOLOGY

## 2023-10-17 PROCEDURE — 7100000001 HC PACU RECOVERY - ADDTL 15 MIN: Performed by: ORTHOPAEDIC SURGERY

## 2023-10-17 PROCEDURE — 7100000011 HC PHASE II RECOVERY - ADDTL 15 MIN: Performed by: ORTHOPAEDIC SURGERY

## 2023-10-17 PROCEDURE — 6360000002 HC RX W HCPCS: Performed by: ORTHOPAEDIC SURGERY

## 2023-10-17 PROCEDURE — 2709999900 HC NON-CHARGEABLE SUPPLY: Performed by: ORTHOPAEDIC SURGERY

## 2023-10-17 PROCEDURE — 3600000015 HC SURGERY LEVEL 5 ADDTL 15MIN: Performed by: ORTHOPAEDIC SURGERY

## 2023-10-17 PROCEDURE — 7100000010 HC PHASE II RECOVERY - FIRST 15 MIN: Performed by: ORTHOPAEDIC SURGERY

## 2023-10-17 PROCEDURE — 2580000003 HC RX 258: Performed by: ORTHOPAEDIC SURGERY

## 2023-10-17 PROCEDURE — 3700000001 HC ADD 15 MINUTES (ANESTHESIA): Performed by: ORTHOPAEDIC SURGERY

## 2023-10-17 PROCEDURE — 7100000000 HC PACU RECOVERY - FIRST 15 MIN: Performed by: ORTHOPAEDIC SURGERY

## 2023-10-17 PROCEDURE — 2500000003 HC RX 250 WO HCPCS: Performed by: NURSE ANESTHETIST, CERTIFIED REGISTERED

## 2023-10-17 PROCEDURE — A4217 STERILE WATER/SALINE, 500 ML: HCPCS | Performed by: ORTHOPAEDIC SURGERY

## 2023-10-17 RX ORDER — SODIUM CHLORIDE 0.9 % (FLUSH) 0.9 %
5-40 SYRINGE (ML) INJECTION EVERY 12 HOURS SCHEDULED
Status: DISCONTINUED | OUTPATIENT
Start: 2023-10-17 | End: 2023-10-17 | Stop reason: HOSPADM

## 2023-10-17 RX ORDER — ONDANSETRON 2 MG/ML
4 INJECTION INTRAMUSCULAR; INTRAVENOUS
Status: DISCONTINUED | OUTPATIENT
Start: 2023-10-17 | End: 2023-10-17 | Stop reason: HOSPADM

## 2023-10-17 RX ORDER — LORAZEPAM 2 MG/ML
0.5 INJECTION INTRAMUSCULAR
Status: DISCONTINUED | OUTPATIENT
Start: 2023-10-17 | End: 2023-10-17 | Stop reason: HOSPADM

## 2023-10-17 RX ORDER — DIPHENHYDRAMINE HYDROCHLORIDE 50 MG/ML
12.5 INJECTION INTRAMUSCULAR; INTRAVENOUS
Status: DISCONTINUED | OUTPATIENT
Start: 2023-10-17 | End: 2023-10-17 | Stop reason: HOSPADM

## 2023-10-17 RX ORDER — BUPIVACAINE HYDROCHLORIDE 5 MG/ML
INJECTION, SOLUTION EPIDURAL; INTRACAUDAL
Status: COMPLETED | OUTPATIENT
Start: 2023-10-17 | End: 2023-10-17

## 2023-10-17 RX ORDER — MEPERIDINE HYDROCHLORIDE 25 MG/ML
12.5 INJECTION INTRAMUSCULAR; INTRAVENOUS; SUBCUTANEOUS
Status: DISCONTINUED | OUTPATIENT
Start: 2023-10-17 | End: 2023-10-17 | Stop reason: HOSPADM

## 2023-10-17 RX ORDER — SODIUM CHLORIDE 0.9 % (FLUSH) 0.9 %
5-40 SYRINGE (ML) INJECTION PRN
Status: DISCONTINUED | OUTPATIENT
Start: 2023-10-17 | End: 2023-10-17 | Stop reason: HOSPADM

## 2023-10-17 RX ORDER — SODIUM CHLORIDE 9 MG/ML
INJECTION, SOLUTION INTRAVENOUS PRN
Status: DISCONTINUED | OUTPATIENT
Start: 2023-10-17 | End: 2023-10-17 | Stop reason: HOSPADM

## 2023-10-17 RX ORDER — ONDANSETRON 2 MG/ML
INJECTION INTRAMUSCULAR; INTRAVENOUS PRN
Status: DISCONTINUED | OUTPATIENT
Start: 2023-10-17 | End: 2023-10-17 | Stop reason: SDUPTHER

## 2023-10-17 RX ORDER — MAGNESIUM HYDROXIDE 1200 MG/15ML
LIQUID ORAL CONTINUOUS PRN
Status: COMPLETED | OUTPATIENT
Start: 2023-10-17 | End: 2023-10-17

## 2023-10-17 RX ORDER — FENTANYL CITRATE 50 UG/ML
INJECTION, SOLUTION INTRAMUSCULAR; INTRAVENOUS PRN
Status: DISCONTINUED | OUTPATIENT
Start: 2023-10-17 | End: 2023-10-17 | Stop reason: SDUPTHER

## 2023-10-17 RX ORDER — DEXAMETHASONE SODIUM PHOSPHATE 4 MG/ML
INJECTION, SOLUTION INTRA-ARTICULAR; INTRALESIONAL; INTRAMUSCULAR; INTRAVENOUS; SOFT TISSUE PRN
Status: DISCONTINUED | OUTPATIENT
Start: 2023-10-17 | End: 2023-10-17 | Stop reason: SDUPTHER

## 2023-10-17 RX ORDER — HALOPERIDOL 5 MG/ML
1 INJECTION INTRAMUSCULAR
Status: DISCONTINUED | OUTPATIENT
Start: 2023-10-17 | End: 2023-10-17 | Stop reason: HOSPADM

## 2023-10-17 RX ORDER — FENTANYL CITRATE 0.05 MG/ML
50 INJECTION, SOLUTION INTRAMUSCULAR; INTRAVENOUS EVERY 5 MIN PRN
Status: DISCONTINUED | OUTPATIENT
Start: 2023-10-17 | End: 2023-10-17 | Stop reason: HOSPADM

## 2023-10-17 RX ORDER — LIDOCAINE HYDROCHLORIDE 20 MG/ML
INJECTION, SOLUTION EPIDURAL; INFILTRATION; INTRACAUDAL; PERINEURAL PRN
Status: DISCONTINUED | OUTPATIENT
Start: 2023-10-17 | End: 2023-10-17 | Stop reason: SDUPTHER

## 2023-10-17 RX ORDER — HYDROCODONE BITARTRATE AND ACETAMINOPHEN 5; 325 MG/1; MG/1
1 TABLET ORAL
Status: DISCONTINUED | OUTPATIENT
Start: 2023-10-17 | End: 2023-10-17 | Stop reason: HOSPADM

## 2023-10-17 RX ORDER — PROPOFOL 10 MG/ML
INJECTION, EMULSION INTRAVENOUS PRN
Status: DISCONTINUED | OUTPATIENT
Start: 2023-10-17 | End: 2023-10-17 | Stop reason: SDUPTHER

## 2023-10-17 RX ADMIN — DEXAMETHASONE SODIUM PHOSPHATE 4 MG: 4 INJECTION, SOLUTION INTRAMUSCULAR; INTRAVENOUS at 12:02

## 2023-10-17 RX ADMIN — SODIUM CHLORIDE: 9 INJECTION, SOLUTION INTRAVENOUS at 10:50

## 2023-10-17 RX ADMIN — CEFAZOLIN 2000 MG: 2 INJECTION, POWDER, FOR SOLUTION INTRAMUSCULAR; INTRAVENOUS at 12:01

## 2023-10-17 RX ADMIN — FENTANYL CITRATE 50 MCG: 50 INJECTION INTRAMUSCULAR; INTRAVENOUS at 12:07

## 2023-10-17 RX ADMIN — FENTANYL CITRATE 50 MCG: 50 INJECTION INTRAMUSCULAR; INTRAVENOUS at 12:00

## 2023-10-17 RX ADMIN — ONDANSETRON 4 MG: 2 INJECTION INTRAMUSCULAR; INTRAVENOUS at 12:02

## 2023-10-17 RX ADMIN — LIDOCAINE HYDROCHLORIDE 60 MG: 20 INJECTION, SOLUTION EPIDURAL; INFILTRATION; INTRACAUDAL; PERINEURAL at 12:00

## 2023-10-17 RX ADMIN — PROPOFOL 160 MG: 10 INJECTION, EMULSION INTRAVENOUS at 12:00

## 2023-10-17 ASSESSMENT — PAIN DESCRIPTION - FREQUENCY: FREQUENCY: INTERMITTENT

## 2023-10-17 ASSESSMENT — PAIN - FUNCTIONAL ASSESSMENT
PAIN_FUNCTIONAL_ASSESSMENT: NONE - DENIES PAIN
PAIN_FUNCTIONAL_ASSESSMENT: PREVENTS OR INTERFERES SOME ACTIVE ACTIVITIES AND ADLS

## 2023-10-17 ASSESSMENT — PAIN DESCRIPTION - ONSET: ONSET: ON-GOING

## 2023-10-17 ASSESSMENT — LIFESTYLE VARIABLES: SMOKING_STATUS: 0

## 2023-10-17 ASSESSMENT — PAIN DESCRIPTION - DESCRIPTORS: DESCRIPTORS: ACHING

## 2023-10-17 ASSESSMENT — PAIN DESCRIPTION - LOCATION: LOCATION: HAND

## 2023-10-17 ASSESSMENT — ENCOUNTER SYMPTOMS: SHORTNESS OF BREATH: 0

## 2023-10-17 ASSESSMENT — PAIN DESCRIPTION - ORIENTATION: ORIENTATION: LEFT

## 2023-10-17 ASSESSMENT — PAIN SCALES - GENERAL: PAINLEVEL_OUTOF10: 3

## 2023-10-17 ASSESSMENT — PAIN DESCRIPTION - PAIN TYPE: TYPE: ACUTE PAIN

## 2023-10-17 NOTE — OP NOTE
protecting the neurovascular structures. The skin flaps were raised full thickness and were carefully retracted. The Neurovascular bundles were isolated and resected proximally enough to avoid painful digital neuroma formation. The Bone was transected at the planned level and the end was contoured to a comfortable shape. The nail structures were partially preserved. The wound was irrigated copiously with sterile saline for irrigation. The pneumo-tourniquet was deflated after a period of 6 minutes elevation. The fingers & flap were immediately pink and well perfused. Hemostasis was easily obtained with direct pressure and electrocautery. The flaps were inset and approximated and the wound was closed with interrupted absorbable sutures in the skin. The wound was dressed with adaptic, dry sterile dressings, and a very well padded hand and finger dressing was applied. Mr. Julieta Cuba  was awakened from anesthesia having tolerated the procedure without apparent complication, and was returned to the recovery room in stable condition. At the conclusion of the procedure all needle, instrument, and sponge counts were correct. Kayley Mckoy MD   10/17/2023 , 11:59 AM

## 2023-10-17 NOTE — PROGRESS NOTES
Patient arrived to Phase 2 awake and alert. Patient's vital signs are stable.
Patient tolerating food and drink well. Patient does not have any complaints of nausea.
Patient up to chair with no complications. Patient has no complaints of dizziness at this time. Patient's wife verbalized understanding of discharge instructions. They have no questions at this time.
[facility] Pre-Admission Testing Electronic Communication Worksheet for OR/ENDO Procedures        Patient: Mary Ann Hernández    DOS: 10/17/23    Arrival Time: 10:30AM    Surgery Time: 12:00PM    Meds to Bed:  [] YES    [x]  NO    Transportation Confirmed: [x] YES    []  NO WIFE SYLVIA    History and Physical:  [x] YES    []  NO  [] N/A  If yes, please list doctor or Urgent Care and date of H&P: USE ER REPORT-10/6/23    Additional Clearance(Cardiac, Pulmonary, etc):  [] YES    [x]  NO    Pre-Admission Testing Visit:  [] YES    [x]  NO If no, do labs/testing need to be done DOS?   [] YES    [x]  NO    Medication Reconciliation Complete:  [x] YES    []  NO        Additional Notes:                Interview Complete: [x] YES    []  NO          Joshua Vallejo RN  1:24 PM
your fingers or toes      For your safety, please do not wear any jewelry or body piercing's on the day of surgery. All jewelry must be removed. If you have dentures, they will be removed before going to operating room. For your convenience, we will provide you with a container. If you wear contact lenses or glasses, they will be removed, please bring a case for them. If you have a living will and a durable power of  for healthcare, please bring in a copy. As part of our patient safety program to minimize surgical site infections, we ask you to do the following:    Please notify your surgeon if you develop any illness between         now and the  day of your surgery. This includes a cough, cold, fever, sore throat, nausea,         or vomiting, and diarrhea, etc.   Please notify your surgeon if you experience dizziness, shortness         of breath or blurred vision between now and the time of your surgery. Do not shave your operative site 96 hours prior to surgery. For face and neck surgery, men may use an electric razor 48 hours   prior to surgery. You may shower the night before surgery or the morning of   your surgery with an antibacterial soap. You will need to bring a photo ID and insurance card    [facility] has an onsite pharmacy, would you like to utilize our pharmacy     If you will be staying overnight and use a C-pap machine, please bring   your C-pap to hospital     Our goal is to provide you with excellent care, therefore, visitors will be limited to two(2) in the room at a time so that we may focus on providing this care for you. Please contact pre-admission testing if you have any further questions.                  [facility] phone number:  [address] [place] [place] [facility] fax number:  [phone]  Please note these are generalized instructions for all surgical cases, you may be provided with more specific instructions according to your

## 2023-10-17 NOTE — ANESTHESIA POSTPROCEDURE EVALUATION
Department of Anesthesiology  Postprocedure Note    Patient: Mary Ann Hernández  MRN: [id number]  YOB: 1960  Date of evaluation: 10/17/2023      Procedure Summary     Date: 10/17/23 Room / Location: [address]    Anesthesia Start: 8567 Anesthesia Stop: 1217    Procedure: LEFT THUMB IRRIGATION AND DEBRIDEMENT WITH REVISION AMPUTATION AND BONE [facility] (Left: Thumb) Diagnosis:       Traumatic amputation of finger, initial encounter      (Traumatic amputation of finger, initial encounter [T35.483E])    Surgeons: Jose Manuel Pugh MD Responsible Provider: Mau Pierce MD    Anesthesia Type: general ASA Status: 2          Anesthesia Type: No value filed.     Chiquita Phase I: Chiquita Score: 9    Chiquita Phase II: Chiquita Score: 8      Anesthesia Post Evaluation    Patient location during evaluation: PACU  Patient participation: complete - patient participated  Level of consciousness: awake and alert  Pain score: 2  Airway patency: patent  Nausea & Vomiting: no nausea and no vomiting  Complications: no  Cardiovascular status: blood pressure returned to baseline  Respiratory status: acceptable  Hydration status: euvolemic  Pain management: adequate

## 2023-10-17 NOTE — H&P
Pre-operative Update of H&P:    I  have seen & examined Mr. Baltazar Hoover related solely to his hand and upper extremity conditions, prior to the scheduled procedure on the date of his surgery. The indications for the planned surgical procedure & and his upper-extremity condition are unchanged.

## 2023-10-17 NOTE — DISCHARGE INSTRUCTIONS
Post-Operative Instructions    Hand & Wrist Surgery:    Keep hand strictly elevated with fingers above eye-level to control swelling and pain. NOTE: If hand is allowed to swell, pain may be very difficult to control. Keep hand and bandage clean and dry. Do not change or unwrap bandage. Please leave bandage in place until your follow-up appointment. Maintain finger motion by fully straightening and fully bending fingers. You should not use your operated hand for any tasks. NO LIFTING, CARRYING OR HEAVY USE. You may use an arm sling as you wish, but this is not adequate elevation to control swelling and pain. You may take the prescribed pain medication as needed  OR   You may take over the counter medication (Tylenol, Advil, Aleve, etc.) but you should not take both together unless otherwise instructed. Please call the office at [phone]  in 24 - 48 hours to schedule a follow up appointment after surgery. Please call the office at [phone]  if you have any questions or problems. Buster Collins MD

## 2023-10-18 ENCOUNTER — OFFICE VISIT (OUTPATIENT)
Dept: CARDIOLOGY CLINIC | Age: 63
End: 2023-10-18
Payer: COMMERCIAL

## 2023-10-18 VITALS
HEART RATE: 78 BPM | HEIGHT: 74 IN | OXYGEN SATURATION: 95 % | WEIGHT: 247 LBS | DIASTOLIC BLOOD PRESSURE: 70 MMHG | SYSTOLIC BLOOD PRESSURE: 112 MMHG | BODY MASS INDEX: 31.7 KG/M2

## 2023-10-18 DIAGNOSIS — R42 DIZZINESS: Primary | ICD-10-CM

## 2023-10-18 PROCEDURE — 99204 OFFICE O/P NEW MOD 45 MIN: CPT | Performed by: INTERNAL MEDICINE

## 2023-10-23 ENCOUNTER — OFFICE VISIT (OUTPATIENT)
Dept: ORTHOPEDIC SURGERY | Age: 63
End: 2023-10-23

## 2023-10-23 VITALS — BODY MASS INDEX: 31.7 KG/M2 | HEIGHT: 74 IN | WEIGHT: 247 LBS

## 2023-10-23 DIAGNOSIS — S68.119A AMPUTATION OF TIP OF FINGER, INITIAL ENCOUNTER: Primary | ICD-10-CM

## 2023-10-23 PROCEDURE — 99024 POSTOP FOLLOW-UP VISIT: CPT | Performed by: PHYSICIAN ASSISTANT

## 2023-10-23 NOTE — PROGRESS NOTES
Mr. Dayna Morrow returns today in follow-up of his recent left Thumb tip injury repair done approximately 1 week ago. He required revision amputation and bone shortening. He has done well post-operatively and has noted decreased discomfort and decreased swelling. no symptoms of infection. He notes no symptoms of numbness, tingling, no symptoms related to perfusion. Physical Exam:  Skin is healing well, without erythema, drainage or sign of infection. Digital range of motion is without significant limitation. ,Thumb shows range of motion to be stiff from immobilization. Wrist range of motion is Full. Sensation is normal in the Radial Digital Nerve and Ulnar Digital Nerve distribution of the Thumb. Vascular examination reveals normal, good capillary refill, and good color. Swelling is mild in the injured digit(s). There is no surrounding erythema, redness & induration. Radiographic Evaluation:  Radiographs were not obtained today. Impression:  Mr. Dayna Morrow is doing well after recent  left Thumb tip repair. It would appear that he has substantially healed his injury. Plan:  Mr. Dayna Morrow is instructed in the appropriate short term protection of his healing finger tip injury. We discussed the use of either a removable protective orthosis or activity adjustments as the situation demands. He is demonstrated the application and use of both devices. He is also instructed in work on Active & Passive range of motion of the digits, wrist, & elbow. These modalities were demonstrated to him today. We discussed the continued restrictions on the use of the injured hand and the limitations on resumption of activities until full range of motion and comfort have been regained. I have explained the time course and likely expectations for maximal recovery of motion and function.       We discussed the option of pursuing formalized hand therapy and a prescription  was not

## 2024-01-24 RX ORDER — ATORVASTATIN CALCIUM 10 MG/1
TABLET, FILM COATED ORAL
Qty: 30 TABLET | Refills: 3 | Status: SHIPPED | OUTPATIENT
Start: 2024-01-24

## 2024-01-24 NOTE — TELEPHONE ENCOUNTER
Last office visit 9/29/2023       Next office visit scheduled Visit date not found    Requested Prescriptions     Pending Prescriptions Disp Refills    atorvastatin (LIPITOR) 10 MG tablet [Pharmacy Med Name: ATORVASTATIN 10 MG TABLET] 30 tablet 3     Sig: TAKE ONE TABLET BY MOUTH EVERY MORNING

## 2024-01-29 NOTE — PROGRESS NOTES
Oxycodone 1 tablet PO given for pain 4/10 in left knee. Pt tolerates PO. VSS. Discharge instructions reviewed with pt and wife. Both express an understanding of instructions. Wife assisting pt to dress sitting up on side of stretcher. Transaminitis

## 2024-02-27 ENCOUNTER — NURSE ONLY (OUTPATIENT)
Dept: FAMILY MEDICINE CLINIC | Age: 64
End: 2024-02-27
Payer: COMMERCIAL

## 2024-02-27 DIAGNOSIS — R05.9 COUGH, UNSPECIFIED TYPE: ICD-10-CM

## 2024-02-27 DIAGNOSIS — J02.9 SORE THROAT: ICD-10-CM

## 2024-02-27 DIAGNOSIS — R09.81 NASAL SINUS CONGESTION: Primary | ICD-10-CM

## 2024-02-27 LAB
INFLUENZA A ANTIBODY: NEGATIVE
INFLUENZA B ANTIBODY: NEGATIVE
S PYO AG THROAT QL: NORMAL

## 2024-02-27 PROCEDURE — 87880 STREP A ASSAY W/OPTIC: CPT | Performed by: FAMILY MEDICINE

## 2024-02-27 PROCEDURE — 87804 INFLUENZA ASSAY W/OPTIC: CPT | Performed by: FAMILY MEDICINE

## 2024-02-27 ASSESSMENT — PATIENT HEALTH QUESTIONNAIRE - PHQ9
1. LITTLE INTEREST OR PLEASURE IN DOING THINGS: 0
SUM OF ALL RESPONSES TO PHQ QUESTIONS 1-9: 0
2. FEELING DOWN, DEPRESSED OR HOPELESS: 0
SUM OF ALL RESPONSES TO PHQ QUESTIONS 1-9: 0
SUM OF ALL RESPONSES TO PHQ9 QUESTIONS 1 & 2: 0

## 2024-02-28 LAB — SARS-COV-2 RNA RESP QL NAA+PROBE: NOT DETECTED

## 2024-03-25 RX ORDER — LORATADINE 10 MG/1
10 TABLET ORAL DAILY
Qty: 30 TABLET | Refills: 2 | Status: SHIPPED | OUTPATIENT
Start: 2024-03-25

## 2024-03-25 NOTE — TELEPHONE ENCOUNTER
Last office visit 9/29/2023     Last written      Next office visit scheduled Visit date not found    Requested Prescriptions     Pending Prescriptions Disp Refills    loratadine (CLARITIN) 10 MG tablet [Pharmacy Med Name: LORATADINE 10 MG TABLET] 30 tablet 2     Sig: TAKE 1 TABLET BY MOUTH DAILY

## 2024-09-17 RX ORDER — LORATADINE 10 MG/1
10 TABLET ORAL DAILY
Qty: 30 TABLET | Refills: 2 | OUTPATIENT
Start: 2024-09-17

## 2024-09-17 RX ORDER — ATORVASTATIN CALCIUM 10 MG/1
10 TABLET, FILM COATED ORAL EVERY MORNING
Qty: 30 TABLET | Refills: 3 | OUTPATIENT
Start: 2024-09-17

## 2024-09-20 RX ORDER — ATORVASTATIN CALCIUM 10 MG/1
10 TABLET, FILM COATED ORAL EVERY MORNING
Qty: 30 TABLET | Refills: 3 | Status: SHIPPED | OUTPATIENT
Start: 2024-09-20

## 2024-11-25 RX ORDER — LORATADINE 10 MG/1
10 TABLET ORAL DAILY
Qty: 30 TABLET | Refills: 2 | Status: SHIPPED | OUTPATIENT
Start: 2024-11-25

## 2024-11-25 RX ORDER — ATORVASTATIN CALCIUM 10 MG/1
10 TABLET, FILM COATED ORAL EVERY MORNING
Qty: 30 TABLET | Refills: 3 | Status: SHIPPED | OUTPATIENT
Start: 2024-11-25

## 2024-11-25 NOTE — TELEPHONE ENCOUNTER
Last office visit 9/29/2023     Last written      Next office visit scheduled Visit date not found    Requested Prescriptions     Pending Prescriptions Disp Refills    loratadine (CLARITIN) 10 MG tablet 30 tablet 2     Sig: Take 1 tablet by mouth daily    atorvastatin (LIPITOR) 10 MG tablet 30 tablet 3     Sig: Take 1 tablet by mouth every morning

## 2025-03-20 RX ORDER — LORATADINE 10 MG/1
10 TABLET ORAL DAILY
Qty: 30 TABLET | Refills: 2 | OUTPATIENT
Start: 2025-03-20

## 2025-03-20 NOTE — TELEPHONE ENCOUNTER
Last office visit 9/29/2023      Next office visit scheduled Visit date not found    Requested Prescriptions     Pending Prescriptions Disp Refills    loratadine (CLARITIN) 10 MG tablet [Pharmacy Med Name: LORATADINE 10 MG TABLET] 30 tablet 2     Sig: Take 1 tablet by mouth daily

## 2025-03-21 NOTE — TELEPHONE ENCOUNTER
Pt will call back. He thought he had sent a request through [employer] for an appt. He is in the middle of something. He will call back to schedule.

## 2025-04-01 ENCOUNTER — PATIENT MESSAGE (OUTPATIENT)
Dept: PRIMARY CARE CLINIC | Age: 65
End: 2025-04-01

## 2025-04-01 NOTE — TELEPHONE ENCOUNTER
Gave pt's father the phone number to schedule the appt himself.  No further action is needed for this encounter.

## 2025-04-21 ENCOUNTER — OFFICE VISIT (OUTPATIENT)
Dept: PRIMARY CARE CLINIC | Age: 65
End: 2025-04-21

## 2025-04-21 VITALS
WEIGHT: 252 LBS | HEART RATE: 59 BPM | BODY MASS INDEX: 32.34 KG/M2 | DIASTOLIC BLOOD PRESSURE: 82 MMHG | SYSTOLIC BLOOD PRESSURE: 100 MMHG | TEMPERATURE: 97.1 F | HEIGHT: 74 IN | OXYGEN SATURATION: 98 % | RESPIRATION RATE: 16 BRPM

## 2025-04-21 DIAGNOSIS — R35.1 NOCTURIA: ICD-10-CM

## 2025-04-21 DIAGNOSIS — Z00.00 ENCOUNTER FOR WELL ADULT EXAM WITHOUT ABNORMAL FINDINGS: ICD-10-CM

## 2025-04-21 DIAGNOSIS — E78.5 HYPERLIPIDEMIA, UNSPECIFIED HYPERLIPIDEMIA TYPE: ICD-10-CM

## 2025-04-21 DIAGNOSIS — Z12.11 ENCOUNTER FOR SCREENING COLONOSCOPY: ICD-10-CM

## 2025-04-21 DIAGNOSIS — Z00.00 ADULT WELLNESS VISIT: Primary | ICD-10-CM

## 2025-04-21 RX ORDER — ATORVASTATIN CALCIUM 10 MG/1
10 TABLET, FILM COATED ORAL EVERY MORNING
Qty: 90 TABLET | Refills: 2 | Status: SHIPPED | OUTPATIENT
Start: 2025-04-21

## 2025-04-21 RX ORDER — SILDENAFIL 50 MG/1
50 TABLET, FILM COATED ORAL PRN
Qty: 90 TABLET | Refills: 0 | Status: SHIPPED | OUTPATIENT
Start: 2025-04-21

## 2025-04-21 RX ORDER — LORATADINE 10 MG/1
10 TABLET ORAL DAILY
Qty: 30 TABLET | Refills: 2 | Status: SHIPPED | OUTPATIENT
Start: 2025-04-21

## 2025-04-21 SDOH — ECONOMIC STABILITY: FOOD INSECURITY: WITHIN THE PAST 12 MONTHS, THE FOOD YOU BOUGHT JUST DIDN'T LAST AND YOU DIDN'T HAVE MONEY TO GET MORE.: NEVER TRUE

## 2025-04-21 SDOH — ECONOMIC STABILITY: FOOD INSECURITY: WITHIN THE PAST 12 MONTHS, YOU WORRIED THAT YOUR FOOD WOULD RUN OUT BEFORE YOU GOT MONEY TO BUY MORE.: NEVER TRUE

## 2025-04-21 ASSESSMENT — ENCOUNTER SYMPTOMS
SORE THROAT: 0
SHORTNESS OF BREATH: 0
NAUSEA: 0
FACIAL SWELLING: 0
ABDOMINAL PAIN: 0
WHEEZING: 0
COUGH: 0
CHEST TIGHTNESS: 0
RHINORRHEA: 0
TROUBLE SWALLOWING: 0
VOMITING: 0
DIARRHEA: 0
SINUS PRESSURE: 0

## 2025-04-21 ASSESSMENT — PATIENT HEALTH QUESTIONNAIRE - PHQ9
1. LITTLE INTEREST OR PLEASURE IN DOING THINGS: NOT AT ALL
SUM OF ALL RESPONSES TO PHQ QUESTIONS 1-9: 0
2. FEELING DOWN, DEPRESSED OR HOPELESS: NOT AT ALL
SUM OF ALL RESPONSES TO PHQ QUESTIONS 1-9: 0

## 2025-04-21 NOTE — PROGRESS NOTES
Disp-90 tablet, R-0Normal  Encounter for well adult exam without abnormal findings        No follow-ups on file.       Subjective   History:  Patient presented today for follow-up and his physical exam.  Overall he feels well but has several concerns and needs labs today.    Review of Systems   Constitutional:  Positive for fatigue. Negative for activity change, fever and unexpected weight change.   HENT:  Negative for congestion, ear pain, facial swelling, hearing loss, rhinorrhea, sinus pressure, sore throat and trouble swallowing.    Eyes:  Negative for visual disturbance.   Respiratory:  Negative for cough, chest tightness, shortness of breath and wheezing.    Cardiovascular:  Negative for chest pain, palpitations and leg swelling.   Gastrointestinal:  Negative for abdominal pain, diarrhea, nausea and vomiting.   Endocrine: Negative for cold intolerance, heat intolerance, polydipsia and polyphagia.   Musculoskeletal:  Positive for arthralgias and gait problem.   Skin:  Negative for rash.   Neurological:  Negative for dizziness, syncope, light-headedness and headaches.   Psychiatric/Behavioral:  Negative for dysphoric mood. The patient is not nervous/anxious.        No Known Allergies  Prior to Visit Medications    Medication Sig Taking? Authorizing Provider   atorvastatin (LIPITOR) 10 MG tablet Take 1 tablet by mouth every morning Yes Arsalan Banegas, DO   loratadine (CLARITIN) 10 MG tablet Take 1 tablet by mouth daily Yes Arsalan Banegas, DO   sildenafil (VIAGRA) 50 MG tablet Take 1 tablet by mouth as needed for Erectile Dysfunction Yes Arsalan Banegas, DO   clobetasol propionate 0.05 % GEL gel Apply 1 application topically as needed Yes Provider, MD Latasha     Past Medical History:   Diagnosis Date    Arthritis     LEFT KNEE-RESOLVED AFTER TOTAL KNEE REPLACEMENT    Hyperlipidemia     BORDERLINE    MRSA (methicillin resistant staph aureus) culture positive     Psoriasis      Past Surgical History:

## 2025-04-22 NOTE — PATIENT INSTRUCTIONS
Well Visit, Over 65: Care Instructions  Well visits can help you stay healthy. Your doctor has checked your overall health and may have suggested ways to take good care of yourself. Your doctor also may have recommended tests. You can help prevent illness with healthy eating, good sleep, vaccinations, regular exercise, and other steps.    Get the tests that you and your doctor decide on. Depending on your age and risks, examples might include hearing tests as well as screening for colon, breast, and lung cancer. Screening helps find diseases before any symptoms appear.   Eat healthy foods. Choose fruits, vegetables, whole grains, lean protein, and low-fat dairy foods. Limit saturated fat, and reduce salt.     Limit alcohol. Men should have no more than 2 drinks a day. Women should have no more than 1. For some people, no alcohol is the best choice.   Exercise. It can help prevent falls. Get at least 30 minutes of exercise on most days of the week. Walking, yoga, and carmencita chi can be good choices.     Reach and stay at your healthy weight. This will lower your risk for many health problems.   Take care of your mental health. Try to stay connected with friends, family, and community, and find ways to manage stress.     If you're feeling depressed or hopeless, talk to someone. A counselor can help. If you don't have a counselor, talk to your doctor.   Talk to your doctor if you think you may have a problem with alcohol or drug use. This includes prescription medicines and illegal drugs.     Avoid tobacco and nicotine: Don't smoke, vape, or chew. If you need help quitting, talk to your doctor.   Practice safer sex. Getting tested, using condoms or dental dams, and limiting sex partners can help prevent STIs.     Make an advance directive. This is a legal way to tell your family and doctor what you want to happen at the end of your life or when you can't speak for yourself.   Prevent problems where you can. Protect

## (undated) DEVICE — WRAP COHESIVE W2INXL5YD TAN SELF ADH BNDG HND NON STERILE TEAR CARING

## (undated) DEVICE — SHEET,DRAPE,53X77,STERILE: Brand: MEDLINE

## (undated) DEVICE — STERILE TOTAL KNEE DRAPE PACK: Brand: CARDINAL HEALTH

## (undated) DEVICE — PIN GUIDE BONE MAKO 3.2X110MM ST

## (undated) DEVICE — COVER LT HNDL BLU PLAS

## (undated) DEVICE — BANDAGE COMPR W6INXL15YD WHT BGE POLY COT WV E HK LOOP CLSR

## (undated) DEVICE — GOWN SIRUS NONREIN XL W/TWL: Brand: MEDLINE INDUSTRIES, INC.

## (undated) DEVICE — INTENDED FOR TISSUE SEPARATION, AND OTHER PROCEDURES THAT REQUIRE A SHARP SURGICAL BLADE TO PUNCTURE OR CUT.: Brand: BARD-PARKER ® STAINLESS STEEL BLADES

## (undated) DEVICE — GLOVE SURG SZ 65 L12IN FNGR THK79MIL GRN LTX FREE

## (undated) DEVICE — KIT INT FIX FEM TIB CKPT MAKOPLASTY

## (undated) DEVICE — PAD,NON-ADHERENT,3X8,STERILE,LF,1/PK: Brand: MEDLINE

## (undated) DEVICE — SUTURE ABSRB L30CM 2-0 VLT SPRL PDS + STRATAFIX SXPP1B410

## (undated) DEVICE — WILLIS PACK: Brand: MEDLINE INDUSTRIES, INC.

## (undated) DEVICE — COTTON UNDERCAST PADDING,CRIMPED FINISH: Brand: WEBRIL

## (undated) DEVICE — SOLUTION IV IRRIG POUR BRL 0.9% SODIUM CHL 2F7124

## (undated) DEVICE — SYSTEM SKIN CLOSURE 42CM DERMABOND PRINEO

## (undated) DEVICE — SOLUTION IV 1000ML 0.9% SOD CHL FOR IRRIG PLAS CONT

## (undated) DEVICE — DUAL CUT SAGITTAL BLADE

## (undated) DEVICE — GLOVE SURG SZ 75 CRM LTX FREE POLYISOPRENE POLYMER BEAD ANTI

## (undated) DEVICE — PIN BNE FIX TEMP L140MM DIA4MM MAKO

## (undated) DEVICE — 1010 S-DRAPE TOWEL DRAPE 10/BX: Brand: STERI-DRAPE™

## (undated) DEVICE — KIT DRP FOR RIO ROBOTIC ARM ASST SYS

## (undated) DEVICE — SUTURE STRATAFIX SPRL SZ 2 0 L14IN ABSRB UD MH L36MM 1 2 CIR SXMD2B401

## (undated) DEVICE — GLOVE ORTHO 8   MSG9480

## (undated) DEVICE — SOLUTION IV IRRIG 250ML ST LF 0.9% SODIUM 2F7122

## (undated) DEVICE — BANDAGE COMPR W4INXL15FT BGE E SGL LAYERED CLP CLSR

## (undated) DEVICE — GLOVE SURG SZ 8 L12IN FNGR THK79MIL GRN LTX FREE

## (undated) DEVICE — BASIC SINGLE BASIN 1-LF: Brand: MEDLINE INDUSTRIES, INC.

## (undated) DEVICE — SOLUTION PREP POVIDONE IOD FOR SKIN MUCOUS MEM PRIOR TO

## (undated) DEVICE — SUTURE ABSORBABLE MONOFILAMENT 1-0 OS8 14 IN STRATAFIX SPRL SXPD2B202

## (undated) DEVICE — WRAP LEG DEMAYO ST

## (undated) DEVICE — SUTURE STRATAFIX SPRL SZ 3-0 L12IN ABSRB UD FS-1 L30X30CM SXMP2B410

## (undated) DEVICE — CORD RETRCT SIL

## (undated) DEVICE — GLOVE SURG SZ 65 CRM LTX FREE POLYISOPRENE POLYMER BEAD ANTI

## (undated) DEVICE — KIT TRK KNEE PROC VIZADISC

## (undated) DEVICE — TOTAL KNEE: Brand: MEDLINE INDUSTRIES, INC.

## (undated) DEVICE — FORCEPS BX 240CM 2.4MM L NDL RAD JAW 4 M00513334

## (undated) DEVICE — TOWEL,OR,DSP,ST,BLUE,STD,4/PK,20PK/CS: Brand: MEDLINE

## (undated) DEVICE — DRAPE,ORTHOMAX,EXTREMITY: Brand: MEDLINE

## (undated) DEVICE — SUTURE VCRL + SZ 1 L18IN ABSRB UD L36MM CT-1 1/2 CIR VCP841D